# Patient Record
Sex: MALE | Race: WHITE | NOT HISPANIC OR LATINO | Employment: OTHER | URBAN - NONMETROPOLITAN AREA
[De-identification: names, ages, dates, MRNs, and addresses within clinical notes are randomized per-mention and may not be internally consistent; named-entity substitution may affect disease eponyms.]

---

## 2019-07-29 RX ORDER — GABAPENTIN 300 MG/1
300 CAPSULE ORAL 3 TIMES DAILY
COMMUNITY
Start: 2019-01-04 | End: 2020-02-19 | Stop reason: SDUPTHER

## 2019-07-29 RX ORDER — OXYBUTYNIN CHLORIDE 5 MG/1
5 TABLET ORAL 2 TIMES DAILY
COMMUNITY
End: 2020-02-19 | Stop reason: SDUPTHER

## 2019-07-29 RX ORDER — ACETAMINOPHEN 500 MG
1000 TABLET ORAL 2 TIMES DAILY
COMMUNITY

## 2019-07-29 RX ORDER — LANOLIN ALCOHOL/MO/W.PET/CERES
1 CREAM (GRAM) TOPICAL DAILY
COMMUNITY

## 2019-07-29 RX ORDER — CITALOPRAM 20 MG/1
20 TABLET ORAL DAILY
COMMUNITY
End: 2020-02-19 | Stop reason: SDUPTHER

## 2019-07-29 RX ORDER — MELOXICAM 15 MG/1
15 TABLET ORAL DAILY
COMMUNITY
Start: 2019-01-04 | End: 2020-02-19 | Stop reason: SDUPTHER

## 2019-07-29 RX ORDER — LORAZEPAM 0.5 MG/1
0.5 TABLET ORAL EVERY 6 HOURS PRN
COMMUNITY
End: 2019-10-22 | Stop reason: SDUPTHER

## 2019-07-29 RX ORDER — LISINOPRIL 20 MG/1
20 TABLET ORAL DAILY
COMMUNITY
End: 2020-02-19 | Stop reason: SDUPTHER

## 2019-08-02 ENCOUNTER — OFFICE VISIT (OUTPATIENT)
Dept: FAMILY MEDICINE CLINIC | Facility: CLINIC | Age: 69
End: 2019-08-02
Payer: MEDICARE

## 2019-08-02 VITALS
WEIGHT: 226 LBS | HEIGHT: 69 IN | BODY MASS INDEX: 33.47 KG/M2 | DIASTOLIC BLOOD PRESSURE: 74 MMHG | SYSTOLIC BLOOD PRESSURE: 126 MMHG

## 2019-08-02 DIAGNOSIS — I10 ESSENTIAL HYPERTENSION: Chronic | ICD-10-CM

## 2019-08-02 DIAGNOSIS — G62.9 NEUROPATHY: Chronic | ICD-10-CM

## 2019-08-02 DIAGNOSIS — E11.9 TYPE 2 DIABETES MELLITUS WITHOUT COMPLICATION, WITHOUT LONG-TERM CURRENT USE OF INSULIN (HCC): Primary | ICD-10-CM

## 2019-08-02 DIAGNOSIS — S14.129S CENTRAL CORD SYNDROME, SEQUELA (HCC): Chronic | ICD-10-CM

## 2019-08-02 DIAGNOSIS — F41.1 GENERALIZED ANXIETY DISORDER: Chronic | ICD-10-CM

## 2019-08-02 DIAGNOSIS — J30.1 SEASONAL ALLERGIC RHINITIS DUE TO POLLEN: Chronic | ICD-10-CM

## 2019-08-02 PROBLEM — S14.129A CENTRAL CORD SYNDROME (HCC): Chronic | Status: ACTIVE | Noted: 2019-08-02

## 2019-08-02 PROCEDURE — 99214 OFFICE O/P EST MOD 30 MIN: CPT | Performed by: FAMILY MEDICINE

## 2019-08-02 NOTE — PATIENT INSTRUCTIONS
Overall I feel patient is doing very well but does need to get a little more physical activity and needs to try to watch the sweets in his diet    Continue all meds as is and will do wellness check at next visit

## 2019-10-22 DIAGNOSIS — F41.1 GENERALIZED ANXIETY DISORDER: Primary | Chronic | ICD-10-CM

## 2019-10-23 RX ORDER — LORAZEPAM 0.5 MG/1
TABLET ORAL
Qty: 90 TABLET | Refills: 1 | Status: SHIPPED | OUTPATIENT
Start: 2019-10-23 | End: 2020-02-19 | Stop reason: SDUPTHER

## 2019-11-08 LAB — HBA1C MFR BLD HPLC: 5.1 %

## 2019-11-11 NOTE — RESULT ENCOUNTER NOTE
Please call the patient regarding his abnormal result  Cholesterol levels are good  The fasting blood sugar is still slightly elevated but the A1c is relatively low at 5 1 and overall control is good    Watch sweets in diet

## 2019-11-15 ENCOUNTER — OFFICE VISIT (OUTPATIENT)
Dept: FAMILY MEDICINE CLINIC | Facility: CLINIC | Age: 69
End: 2019-11-15
Payer: MEDICARE

## 2019-11-15 VITALS
WEIGHT: 225 LBS | BODY MASS INDEX: 33.33 KG/M2 | HEIGHT: 69 IN | SYSTOLIC BLOOD PRESSURE: 126 MMHG | DIASTOLIC BLOOD PRESSURE: 76 MMHG

## 2019-11-15 DIAGNOSIS — J30.1 SEASONAL ALLERGIC RHINITIS DUE TO POLLEN: Chronic | ICD-10-CM

## 2019-11-15 DIAGNOSIS — E11.9 TYPE 2 DIABETES MELLITUS WITHOUT COMPLICATION, WITHOUT LONG-TERM CURRENT USE OF INSULIN (HCC): Primary | Chronic | ICD-10-CM

## 2019-11-15 DIAGNOSIS — F41.1 GENERALIZED ANXIETY DISORDER: Chronic | ICD-10-CM

## 2019-11-15 DIAGNOSIS — S14.129S CENTRAL CORD SYNDROME, SEQUELA (HCC): Chronic | ICD-10-CM

## 2019-11-15 DIAGNOSIS — G62.9 NEUROPATHY: Chronic | ICD-10-CM

## 2019-11-15 DIAGNOSIS — I10 ESSENTIAL HYPERTENSION: Chronic | ICD-10-CM

## 2019-11-15 DIAGNOSIS — Z23 NEEDS FLU SHOT: ICD-10-CM

## 2019-11-15 DIAGNOSIS — Z00.00 MEDICARE ANNUAL WELLNESS VISIT, SUBSEQUENT: ICD-10-CM

## 2019-11-15 PROCEDURE — 99214 OFFICE O/P EST MOD 30 MIN: CPT | Performed by: FAMILY MEDICINE

## 2019-11-15 PROCEDURE — G0439 PPPS, SUBSEQ VISIT: HCPCS | Performed by: FAMILY MEDICINE

## 2019-11-15 PROCEDURE — G0008 ADMIN INFLUENZA VIRUS VAC: HCPCS | Performed by: FAMILY MEDICINE

## 2019-11-15 PROCEDURE — 90662 IIV NO PRSV INCREASED AG IM: CPT | Performed by: FAMILY MEDICINE

## 2019-11-15 NOTE — PROGRESS NOTES
Assessment and Plan:     Problem List Items Addressed This Visit        Endocrine    Type 2 diabetes mellitus without complication, without long-term current use of insulin (HCC) - Primary (Chronic)     Fasting sugar remains slightly elevated at 1 0 weight but A1c is good watch sweets in diet  Lab Results   Component Value Date    HGBA1C 5 1 11/08/2019            Relevant Orders    Ambulatory referral to Ophthalmology       Respiratory    Seasonal allergic rhinitis due to pollen (Chronic)     Overall doing well continue p r n  Meds            Cardiovascular and Mediastinum    Essential hypertension (Chronic)     Overall stable continue current regimen            Nervous and Auditory    Central cord syndrome (HCC) (Chronic)     Reviewed recent consult patient remains very functional despite the injury continue to push activity as tolerated continue current meds         Neuropathy (Chronic)     Appears stable continue baseline meds            Other    Generalized anxiety disorder (Chronic)     Overall doing well continue current regimen           Other Visit Diagnoses     Medicare annual wellness visit, subsequent        Needs flu shot        Relevant Orders    influenza vaccine, [id number], high-dose, PF 0 5 mL (FLUZONE HIGH-DOSE) (Completed)        BMI Counseling: Body mass index is 32 9 kg/m²  The BMI is above normal  Nutrition recommendations include moderation in carbohydrate intake  Exercise recommendations include moderate physical activity 150 minutes/week  No pharmacotherapy was ordered  Preventive health issues were discussed with patient, and age appropriate screening tests were ordered as noted in patient's After Visit Summary  Personalized health advice and appropriate referrals for health education or preventive services given if needed, as noted in patient's After Visit Summary       History of Present Illness:     Patient presents for Medicare Annual Wellness visit    Patient Care Team:  Lexii Garza Madeline Aponte MD as PCP - General (Family Medicine)     Problem List:     Patient Active Problem List   Diagnosis    Type 2 diabetes mellitus without complication, without long-term current use of insulin ([facility] )    Essential hypertension    Seasonal allergic rhinitis due to pollen    Generalized anxiety disorder    [facility]    Neuropathy      Past Medical and Surgical History:     Past Medical History:   Diagnosis Date    Allergic rhinitis     Depression     Essential hypertension     Von Willebrand disease ([facility] )     Pt requires DDAVP prior to "major" surgery      Past Surgical History:   Procedure Laterality Date    CHOLECYSTECTOMY        Family History:     Family History   Problem Relation Age of Onset    Diabetes Mother     Hypertension Mother     Hyperlipidemia Mother     Diabetes Father     Hypertension Father     Hyperlipidemia Father       Social History:     Social History     Socioeconomic History    Marital status: /Civil Union     Spouse name: None    Number of children: None    Years of education: None    Highest education level: None   Occupational History    None   Social Needs    Financial resource strain: None    Food insecurity:     Worry: None     Inability: None    Transportation needs:     Medical: None     Non-medical: None   Tobacco Use    Smoking status: Former Smoker    Smokeless tobacco: Never Used   Substance and Sexual Activity    Alcohol use: Yes     Comment: Rarely     Drug use: Never    Sexual activity: None   Lifestyle    Physical activity:     Days per week: None     Minutes per session: None    Stress: None   Relationships    Social connections:     Talks on phone: None     Gets together: None     Attends Christianity service: None     Active member of club or organization: None     Attends meetings of clubs or organizations: None     Relationship status: None    Intimate partner violence:     Fear of current or ex partner: None Emotionally abused: None     Physically abused: None     Forced sexual activity: None   Other Topics Concern    None   Social History Narrative    None       Medications and Allergies:     Current Outpatient Medications   Medication Sig Dispense Refill    acetaminophen (TYLENOL) 500 mg tablet Take 1,000 mg by mouth 2 (two) times a day      citalopram (CeleXA) 20 mg tablet Take 20 mg by mouth daily      gabapentin (NEURONTIN) 300 mg capsule Take 300 mg by mouth Three times a day      glucosamine-chondroitin 500-400 MG tablet Take 1 tablet by mouth daily      lisinopril (ZESTRIL) 20 mg tablet Take 20 mg by mouth daily      LORazepam (ATIVAN) 0 5 mg tablet TAKE 1 TABLET BY MOUTH  EVERY DAY AS NEEDED 90 tablet 1    meloxicam (MOBIC) 15 mg tablet Take 15 mg by mouth daily      Multiple Vitamins-Minerals (MULTIVITAMIN ADULTS PO) Take 1 tablet by mouth daily      oxybutynin (DITROPAN) 5 mg tablet Take 5 mg by mouth 2 (two) times a day       No current facility-administered medications for this visit        Allergies   Allergen Reactions    Penicillins Other (See Comments)     RASH, hives; valentín PIP 5/22/01 jgo    Sulfamethoxazole-Trimethoprim Other (See Comments)     developed rash      Immunizations:     Immunization History   Administered Date(s) Administered    INFLUENZA 09/27/2001, 10/31/2002, 10/25/2006, 11/20/2007, 11/25/2008, 12/07/2009, 11/30/2010, 09/16/2011, 10/02/2013, 11/21/2014, 09/30/2015, 11/18/2016, 12/13/2017, 10/23/2018    Influenza, high dose seasonal 0 5 mL 11/15/2019    Pneumococcal Polysaccharide PPV23 08/20/2014    Tdap 05/16/2014      Health Maintenance:         Topic Date Due    Hepatitis C Screening  1950    CRC Screening: Colonoscopy  09/16/2024         Topic Date Due    Hepatitis B Vaccine (1 of 3 - Risk 3-dose series) 11/02/1970    Pneumococcal Vaccine: 65+ Years (1 of 2 - PCV13) 11/02/2016      Medicare Health Risk Assessment:     /76 (BP Location: Left arm, Patient Position: Sitting, Cuff Size: Standard)   Ht 5' 9 34" (1 761 m)   Wt 102 kg (225 lb)   BMI 32 90 kg/m²      Aparna Serrano is here for his Subsequent Wellness visit  Last Medicare Wellness visit information reviewed, patient interviewed and updates made to the record today  Health Risk Assessment:   Patient rates overall health as good  Patient feels that their physical health rating is same  Eyesight was rated as same  Hearing was rated as same  Patient feels that their emotional and mental health rating is same  Pain experienced in the last 7 days has been some  Patient's pain rating has been 4/10  Patient states that he has experienced no weight loss or gain in last 6 months  Has is intermittent back and muscle pain but is functioning well and feels this has been at baseline    Depression Screening:   PHQ-2 Score: 0      Fall Risk Screening: In the past year, patient has experienced: no history of falling in past year      Home Safety:  Patient does not have trouble with stairs inside or outside of their home  Patient has working smoke alarms and has working carbon monoxide detector  Home safety hazards include: none  No issues    Nutrition:   Current diet is Regular  Needs to watch sweets in diet    Medications:   Patient is currently taking over-the-counter supplements  OTC medications include: Multivitamin, probiotic, and chondroitin complex  Patient is able to manage medications  No issues    Activities of Daily Living (ADLs)/Instrumental Activities of Daily Living (IADLs):   Walk and transfer into and out of bed and chair?: Yes  Dress and groom yourself?: Yes    Bathe or shower yourself?: Yes    Feed yourself?  Yes  Do your laundry/housekeeping?: Yes  Manage your money, pay your bills and track your expenses?: Yes  Make your own meals?: Yes    Do your own shopping?: Yes    ADL comments: Very functional at home no issues    Previous Hospitalizations:   Any hospitalizations or ED visits within the last 12 months?: No      Hospitalization Comments:  Follows up with [facility], [address] [place] and [address] at [facility]:   Living will: No    Advanced directive: No      Comments: Discussed need to do the living will and patient does have a form for this    Cognitive Screening:   Provider or family/friend/caregiver concerned regarding cognition?: No    PREVENTIVE SCREENINGS      Cardiovascular Screening:    General: Screening Current      Diabetes Screening:     General: Screening Not Indicated and History Diabetes      Colorectal Cancer Screening:     General: Screening Current      Prostate Cancer Screening:    General: Screening Not Indicated      Osteoporosis Screening:    General: Screening Not Indicated      Abdominal Aortic Aneurysm (AAA) Screening:    Risk factors include: age between 73-67 yo and tobacco use        General: Screening Not Indicated      Lung Cancer Screening:     General: Screening Not Indicated      Hepatitis C Screening:    General: Risks and Benefits Discussed      Agusto Frias MD

## 2019-11-15 NOTE — ASSESSMENT & PLAN NOTE
Fasting sugar remains slightly elevated at 1 0 weight but A1c is good watch sweets in diet  Lab Results   Component Value Date    HGBA1C 5 1 11/08/2019

## 2019-11-15 NOTE — PROGRESS NOTES
Diabetic Foot Exam    Patient's shoes and socks removed  Right Foot/Ankle   Right Foot Inspection    Toe Exam: right toe deformity  Sensory       Monofilament testing: intact  Vascular  Capillary refills: < 3 seconds  The right DP pulse is 1+  The right PT pulse is 1+  Left Foot/Ankle  Left Foot Inspection                           Toe Exam: no left toe deformity                   Sensory       Monofilament: intact  Vascular  Capillary refills: < 3 seconds  The left DP pulse is 1+  The left PT pulse is 1+  Assign Risk Category:  Deformity present; No loss of protective sensation;  No weak pulses       Risk: 1

## 2019-11-15 NOTE — ASSESSMENT & PLAN NOTE
Reviewed recent consult patient remains very functional despite the injury continue to push activity as tolerated continue current meds

## 2019-11-15 NOTE — PROGRESS NOTES
Assessment/Plan:    Type 2 diabetes mellitus without complication, without long-term current use of insulin ([facility])  Fasting sugar remains slightly elevated at 1 0 weight but A1c is good watch sweets in diet  Lab Results   Component Value Date    HGBA1C 5 1 11/08/2019       Essential hypertension  Overall stable continue current regimen    Central cord syndrome [facility])  Reviewed recent consult patient remains very functional despite the injury continue to push activity as tolerated continue current meds    Generalized anxiety disorder  Overall doing well continue current regimen    Neuropathy  Appears stable continue baseline meds    Seasonal allergic rhinitis due to pollen  Overall doing well continue p r n  Meds       Diagnoses and all orders for this visit:    Type 2 diabetes mellitus without complication, without long-term current use of insulin ([facility] )  -     Ambulatory referral to Ophthalmology; Future    Medicare annual wellness visit, subsequent    Needs flu shot  -     influenza vaccine, [id number], high-dose, PF 0 5 mL (FLUZONE HIGH-DOSE)    Essential hypertension    Central cord syndrome, sequela ([address] )    Generalized anxiety disorder    Neuropathy    Seasonal allergic rhinitis due to pollen          Subjective:      Patient ID: Raymundo Bowie is a 77 y o  male  Patient has history of spinal cord injury with central cord syndrome and does self catheterization to pass urine  Also has history of hypertension diabetes mellitus adult, allergic rhinitis lower leg neuropathy and stress disorder  Overall has been doing well  The following portions of the patient's history were reviewed and updated as appropriate: allergies, current medications, past medical history, past social history, past surgical history and problem list     Review of Systems   Constitutional: Negative for activity change, appetite change, chills, fatigue, fever and unexpected weight change     HENT: Negative for congestion, dental problem, hearing loss, rhinorrhea, trouble swallowing and voice change  Eyes: Negative for visual disturbance  Respiratory: Negative for apnea, cough, chest tightness and shortness of breath  Cardiovascular: Negative for chest pain, palpitations and leg swelling  Gastrointestinal: Negative for abdominal distention, abdominal pain, constipation and diarrhea  Endocrine: Negative for polyuria  Genitourinary: Positive for difficulty urinating (Does self catheterization 5 times a day) and enuresis  Musculoskeletal: Positive for arthralgias and gait problem  Negative for myalgias  Skin: Negative for rash  Allergic/Immunologic: Positive for environmental allergies  Neurological: Negative for dizziness, weakness, light-headedness, numbness and headaches  Hematological: Negative for adenopathy  Psychiatric/Behavioral: Negative for agitation, confusion and sleep disturbance  The patient is not nervous/anxious  Objective:      /76 (BP Location: Left arm, Patient Position: Sitting, Cuff Size: Standard)   Ht 5' 9 34" (1 761 m)   Wt 102 kg (225 lb)   BMI 32 90 kg/m²          Physical Exam   Constitutional: He is oriented to person, place, and time  He appears well-developed and well-nourished  No distress  HENT:   Head: Normocephalic  Right Ear: Tympanic membrane, external ear and ear canal normal  Decreased hearing ( mixed hearing loss with 25 decibel screen no difficulty with conversation) is noted  Left Ear: Tympanic membrane, external ear and ear canal normal  Decreased hearing ( mixed hearing loss with 25 decibel screen) is noted  Nose: Nose normal    Mouth/Throat: Oropharynx is clear and moist  Normal dentition  Eyes: Pupils are equal, round, and reactive to light  Conjunctivae and EOM are normal    Neck: Normal range of motion  Neck supple  No thyromegaly present  Cardiovascular: Normal rate, regular rhythm, normal heart sounds and intact distal pulses     No murmur (Rate is 72 no bruits are noted) heard  Pulmonary/Chest: Effort normal and breath sounds normal    Abdominal: Soft  He exhibits no distension and no mass  There is no tenderness  Musculoskeletal: He exhibits no edema  Lymphadenopathy:     He has no cervical adenopathy  Neurological: He is alert and oriented to person, place, and time  He displays abnormal reflex ( reflexes 3+)  No cranial nerve deficit or sensory deficit  He exhibits abnormal muscle tone ( spastic quality with the hands and lower legs)  Coordination abnormal    Skin: Skin is warm and dry  Capillary refill takes 2 to 3 seconds  No rash noted  Psychiatric: He has a normal mood and affect  His behavior is normal  Judgment and thought content normal    Nursing note and vitals reviewed

## 2019-11-15 NOTE — PATIENT INSTRUCTIONS

## 2020-02-19 ENCOUNTER — OFFICE VISIT (OUTPATIENT)
Dept: FAMILY MEDICINE CLINIC | Facility: CLINIC | Age: 70
End: 2020-02-19
Payer: MEDICARE

## 2020-02-19 VITALS
HEIGHT: 69 IN | BODY MASS INDEX: 33.62 KG/M2 | DIASTOLIC BLOOD PRESSURE: 74 MMHG | WEIGHT: 227 LBS | SYSTOLIC BLOOD PRESSURE: 124 MMHG

## 2020-02-19 DIAGNOSIS — F41.1 GENERALIZED ANXIETY DISORDER: Chronic | ICD-10-CM

## 2020-02-19 DIAGNOSIS — G62.9 NEUROPATHY: Chronic | ICD-10-CM

## 2020-02-19 DIAGNOSIS — E11.9 TYPE 2 DIABETES MELLITUS WITHOUT COMPLICATION, WITHOUT LONG-TERM CURRENT USE OF INSULIN (HCC): Chronic | ICD-10-CM

## 2020-02-19 DIAGNOSIS — I10 ESSENTIAL HYPERTENSION: Primary | Chronic | ICD-10-CM

## 2020-02-19 DIAGNOSIS — M15.3 POST-TRAUMATIC OSTEOARTHRITIS OF MULTIPLE JOINTS: ICD-10-CM

## 2020-02-19 DIAGNOSIS — S14.129S CENTRAL CORD SYNDROME, SEQUELA (HCC): Chronic | ICD-10-CM

## 2020-02-19 LAB
LEFT EYE DIABETIC RETINOPATHY: NORMAL
RIGHT EYE DIABETIC RETINOPATHY: NORMAL

## 2020-02-19 PROCEDURE — 3008F BODY MASS INDEX DOCD: CPT | Performed by: FAMILY MEDICINE

## 2020-02-19 PROCEDURE — 4040F PNEUMOC VAC/ADMIN/RCVD: CPT | Performed by: FAMILY MEDICINE

## 2020-02-19 PROCEDURE — 4010F ACE/ARB THERAPY RXD/TAKEN: CPT | Performed by: FAMILY MEDICINE

## 2020-02-19 PROCEDURE — 3078F DIAST BP <80 MM HG: CPT | Performed by: FAMILY MEDICINE

## 2020-02-19 PROCEDURE — 99214 OFFICE O/P EST MOD 30 MIN: CPT | Performed by: FAMILY MEDICINE

## 2020-02-19 PROCEDURE — 3074F SYST BP LT 130 MM HG: CPT | Performed by: FAMILY MEDICINE

## 2020-02-19 PROCEDURE — 1036F TOBACCO NON-USER: CPT | Performed by: FAMILY MEDICINE

## 2020-02-19 PROCEDURE — 1160F RVW MEDS BY RX/DR IN RCRD: CPT | Performed by: FAMILY MEDICINE

## 2020-02-19 RX ORDER — LISINOPRIL 20 MG/1
20 TABLET ORAL DAILY
Qty: 90 TABLET | Refills: 3 | Status: SHIPPED | OUTPATIENT
Start: 2020-02-19 | End: 2021-01-02

## 2020-02-19 RX ORDER — GABAPENTIN 300 MG/1
300 CAPSULE ORAL 3 TIMES DAILY
Qty: 270 CAPSULE | Refills: 3 | Status: SHIPPED | OUTPATIENT
Start: 2020-02-19 | End: 2021-03-15

## 2020-02-19 RX ORDER — MELOXICAM 15 MG/1
15 TABLET ORAL DAILY
Qty: 90 TABLET | Refills: 3 | Status: SHIPPED | OUTPATIENT
Start: 2020-02-19 | End: 2021-03-15

## 2020-02-19 RX ORDER — LORAZEPAM 0.5 MG/1
0.5 TABLET ORAL DAILY PRN
Qty: 90 TABLET | Refills: 1 | Status: SHIPPED | OUTPATIENT
Start: 2020-02-19 | End: 2020-08-23

## 2020-02-19 RX ORDER — OXYBUTYNIN CHLORIDE 5 MG/1
5 TABLET ORAL 2 TIMES DAILY
Qty: 180 TABLET | Refills: 3 | Status: SHIPPED | OUTPATIENT
Start: 2020-02-19 | End: 2021-01-02

## 2020-02-19 RX ORDER — CITALOPRAM 20 MG/1
20 TABLET ORAL DAILY
Qty: 90 TABLET | Refills: 3 | Status: SHIPPED | OUTPATIENT
Start: 2020-02-19 | End: 2021-01-02

## 2020-02-19 NOTE — ASSESSMENT & PLAN NOTE
Overall showing excellent control with diet continue to watch starch in diet limiting to 1 medium serving per meal  Lab Results   Component Value Date    HGBA1C 5 1 11/08/2019

## 2020-02-19 NOTE — PROGRESS NOTES
Assessment/Plan:    Type 2 diabetes mellitus without complication, without long-term current use of insulin (HCC)  Overall showing excellent control with diet continue to watch starch in diet limiting to 1 medium serving per meal  Lab Results   Component Value Date    HGBA1C 5 1 11/08/2019       Essential hypertension  Overall stable continue current regimen    Neuropathy  Appears stable continue current regimen  Gave refills    Generalized anxiety disorder  Overall doing well continue current regimen    Central cord syndrome ([address] )  Continues to function well continue baseline meds and follow-up  Push activity as tolerated       Diagnoses and all orders for this visit:    Essential hypertension  -     lisinopril (ZESTRIL) 20 mg tablet; Take 1 tablet (20 mg total) by mouth daily    Type 2 diabetes mellitus without complication, without long-term current use of insulin (HCC)    Generalized anxiety disorder  -     citalopram (CeleXA) 20 mg tablet; Take 1 tablet (20 mg total) by mouth daily  -     LORazepam (ATIVAN) 0 5 mg tablet; Take 1 tablet (0 5 mg total) by mouth daily as needed for anxiety    Central cord syndrome, sequela (HCC)  -     oxybutynin (DITROPAN) 5 mg tablet; Take 1 tablet (5 mg total) by mouth 2 (two) times a day    Neuropathy  -     gabapentin (Neurontin) 300 mg capsule; Take 1 capsule (300 mg total) by mouth 3 (three) times a day    Post-traumatic osteoarthritis of multiple joints  -     meloxicam (MOBIC) 15 mg tablet; Take 1 tablet (15 mg total) by mouth daily          Subjective:      Patient ID: Vik Campa is a 77 y o  male  Patient has history of spinal cord injury with central cord syndrome and does self catheterization to pass urine  Also has history of hypertension diabetes mellitus adult, allergic rhinitis lower leg neuropathy and stress disorder  Overall has been doing well         The following portions of the patient's history were reviewed and updated as appropriate: allergies, current medications, past medical history, past social history and problem list BMI Counseling: Body mass index is 33 19 kg/m²  The BMI is above normal  Nutrition recommendations include moderation in carbohydrate intake  Exercise recommendations include moderate physical activity 150 minutes/week  No pharmacotherapy was ordered       Review of Systems   Constitutional: Negative for activity change, appetite change, chills, fatigue, fever and unexpected weight change  HENT: Negative for congestion, rhinorrhea and trouble swallowing  Eyes: Negative for visual disturbance  Respiratory: Negative for apnea, cough, chest tightness and shortness of breath  Cardiovascular: Negative for chest pain, palpitations and leg swelling  Gastrointestinal: Negative for abdominal distention, abdominal pain, constipation and diarrhea  Endocrine: Negative for polyuria  Genitourinary: Positive for difficulty urinating (Does do self catheterization)  Negative for enuresis  Musculoskeletal: Positive for arthralgias, back pain, gait problem and neck pain  Negative for myalgias  Skin: Negative for rash  Allergic/Immunologic: Positive for environmental allergies  Neurological: Negative for dizziness, weakness, light-headedness, numbness and headaches  Hematological: Negative for adenopathy  Psychiatric/Behavioral: Negative for agitation and sleep disturbance  Objective:      /74 (BP Location: Right arm, Patient Position: Sitting, Cuff Size: Standard)   Ht 5' 9 34" (1 761 m)   Wt 103 kg (227 lb)   BMI 33 19 kg/m²          Physical Exam   Constitutional: He is oriented to person, place, and time  He appears well-developed and well-nourished  No distress  HENT:   Head: Normocephalic  Nose: Nose normal    Mouth/Throat: Oropharynx is clear and moist    Eyes: Conjunctivae are normal    Neck: Neck supple  No thyromegaly present     Cardiovascular: Normal rate, regular rhythm, normal heart sounds and intact distal pulses  No murmur ( rate is 72 no bruits are noted) heard  Pulmonary/Chest: Effort normal and breath sounds normal    Abdominal: Soft  He exhibits no distension and no mass  There is no tenderness  Musculoskeletal: He exhibits no edema  Lymphadenopathy:     He has no cervical adenopathy  Neurological: He is alert and oriented to person, place, and time  He displays abnormal reflex (Reflexes 3 to 4+)  Coordination abnormal    Skin: Skin is warm and dry  No rash noted  Psychiatric: He has a normal mood and affect  His behavior is normal  Judgment and thought content normal    Nursing note and vitals reviewed

## 2020-02-19 NOTE — PATIENT INSTRUCTIONS
Overall seems to be doing well  Gave refills for medications    Patient is going to get eye exam in should have reports forwarded to the office

## 2020-05-29 ENCOUNTER — OFFICE VISIT (OUTPATIENT)
Dept: FAMILY MEDICINE CLINIC | Facility: CLINIC | Age: 70
End: 2020-05-29
Payer: MEDICARE

## 2020-05-29 VITALS
SYSTOLIC BLOOD PRESSURE: 126 MMHG | BODY MASS INDEX: 32.88 KG/M2 | HEIGHT: 69 IN | TEMPERATURE: 97.2 F | WEIGHT: 222 LBS | DIASTOLIC BLOOD PRESSURE: 76 MMHG

## 2020-05-29 DIAGNOSIS — G62.9 NEUROPATHY: Chronic | ICD-10-CM

## 2020-05-29 DIAGNOSIS — I10 ESSENTIAL HYPERTENSION: Chronic | ICD-10-CM

## 2020-05-29 DIAGNOSIS — E11.9 TYPE 2 DIABETES MELLITUS WITHOUT COMPLICATION, WITHOUT LONG-TERM CURRENT USE OF INSULIN (HCC): Primary | Chronic | ICD-10-CM

## 2020-05-29 DIAGNOSIS — J30.1 SEASONAL ALLERGIC RHINITIS DUE TO POLLEN: Chronic | ICD-10-CM

## 2020-05-29 DIAGNOSIS — F41.1 GENERALIZED ANXIETY DISORDER: Chronic | ICD-10-CM

## 2020-05-29 DIAGNOSIS — S14.129S CENTRAL CORD SYNDROME, SEQUELA (HCC): Chronic | ICD-10-CM

## 2020-05-29 LAB — SL AMB POCT HEMOGLOBIN AIC: 5.5 (ref ?–6.5)

## 2020-05-29 PROCEDURE — 3074F SYST BP LT 130 MM HG: CPT | Performed by: FAMILY MEDICINE

## 2020-05-29 PROCEDURE — 1160F RVW MEDS BY RX/DR IN RCRD: CPT | Performed by: FAMILY MEDICINE

## 2020-05-29 PROCEDURE — 3008F BODY MASS INDEX DOCD: CPT | Performed by: FAMILY MEDICINE

## 2020-05-29 PROCEDURE — 4040F PNEUMOC VAC/ADMIN/RCVD: CPT | Performed by: FAMILY MEDICINE

## 2020-05-29 PROCEDURE — 83036 HEMOGLOBIN GLYCOSYLATED A1C: CPT | Performed by: FAMILY MEDICINE

## 2020-05-29 PROCEDURE — 3044F HG A1C LEVEL LT 7.0%: CPT | Performed by: FAMILY MEDICINE

## 2020-05-29 PROCEDURE — 1036F TOBACCO NON-USER: CPT | Performed by: FAMILY MEDICINE

## 2020-05-29 PROCEDURE — 3078F DIAST BP <80 MM HG: CPT | Performed by: FAMILY MEDICINE

## 2020-05-29 PROCEDURE — 99214 OFFICE O/P EST MOD 30 MIN: CPT | Performed by: FAMILY MEDICINE

## 2020-05-29 PROCEDURE — 2022F DILAT RTA XM EVC RTNOPTHY: CPT | Performed by: FAMILY MEDICINE

## 2020-08-08 ENCOUNTER — APPOINTMENT (EMERGENCY)
Dept: CT IMAGING | Facility: HOSPITAL | Age: 70
End: 2020-08-08
Payer: MEDICARE

## 2020-08-08 ENCOUNTER — HOSPITAL ENCOUNTER (OUTPATIENT)
Facility: HOSPITAL | Age: 70
Setting detail: OBSERVATION
Discharge: HOME | End: 2020-08-09
Attending: EMERGENCY MEDICINE | Admitting: FAMILY MEDICINE
Payer: MEDICARE

## 2020-08-08 DIAGNOSIS — R41.82 ALTERED MENTAL STATUS, UNSPECIFIED ALTERED MENTAL STATUS TYPE: Primary | ICD-10-CM

## 2020-08-08 DIAGNOSIS — R55 SYNCOPE: ICD-10-CM

## 2020-08-08 PROBLEM — S00.03XA TRAUMATIC HEMATOMA OF SCALP: Status: ACTIVE | Noted: 2020-08-08

## 2020-08-08 LAB
ALBUMIN SERPL BCP-MCNC: 4.3 G/DL (ref 3.5–5)
ALP SERPL-CCNC: 66 U/L (ref 46–116)
ALT SERPL W P-5'-P-CCNC: 37 U/L (ref 12–78)
ANION GAP SERPL CALCULATED.3IONS-SCNC: 7 MMOL/L (ref 4–13)
APTT PPP: 47 SECONDS (ref 23–37)
AST SERPL W P-5'-P-CCNC: 21 U/L (ref 5–45)
BASOPHILS # BLD AUTO: 0.02 THOUSANDS/ΜL (ref 0–0.1)
BASOPHILS NFR BLD AUTO: 0 % (ref 0–1)
BILIRUB SERPL-MCNC: 1.07 MG/DL (ref 0.2–1)
BUN SERPL-MCNC: 32 MG/DL (ref 5–25)
CALCIUM SERPL-MCNC: 9.3 MG/DL (ref 8.3–10.1)
CHLORIDE SERPL-SCNC: 102 MMOL/L (ref 100–108)
CO2 SERPL-SCNC: 32 MMOL/L (ref 21–32)
CREAT SERPL-MCNC: 1.41 MG/DL (ref 0.6–1.3)
EOSINOPHIL # BLD AUTO: 0.11 THOUSAND/ΜL (ref 0–0.61)
EOSINOPHIL NFR BLD AUTO: 1 % (ref 0–6)
ERYTHROCYTE [DISTWIDTH] IN BLOOD BY AUTOMATED COUNT: 12.7 % (ref 11.6–15.1)
GFR SERPL CREATININE-BSD FRML MDRD: 51 ML/MIN/1.73SQ M
GLUCOSE SERPL-MCNC: 153 MG/DL (ref 65–140)
HCT VFR BLD AUTO: 40.2 % (ref 36.5–49.3)
HGB BLD-MCNC: 14.1 G/DL (ref 12–17)
IMM GRANULOCYTES # BLD AUTO: 0.04 THOUSAND/UL (ref 0–0.2)
IMM GRANULOCYTES NFR BLD AUTO: 1 % (ref 0–2)
INR PPP: 1 (ref 0.84–1.19)
LACTATE SERPL-SCNC: 1 MMOL/L (ref 0.5–2)
LIPASE SERPL-CCNC: 134 U/L (ref 73–393)
LYMPHOCYTES # BLD AUTO: 1.12 THOUSANDS/ΜL (ref 0.6–4.47)
LYMPHOCYTES NFR BLD AUTO: 14 % (ref 14–44)
MCH RBC QN AUTO: 30.7 PG (ref 26.8–34.3)
MCHC RBC AUTO-ENTMCNC: 35.1 G/DL (ref 31.4–37.4)
MCV RBC AUTO: 88 FL (ref 82–98)
MONOCYTES # BLD AUTO: 0.27 THOUSAND/ΜL (ref 0.17–1.22)
MONOCYTES NFR BLD AUTO: 3 % (ref 4–12)
NEUTROPHILS # BLD AUTO: 6.29 THOUSANDS/ΜL (ref 1.85–7.62)
NEUTS SEG NFR BLD AUTO: 81 % (ref 43–75)
NRBC BLD AUTO-RTO: 0 /100 WBCS
PLATELET # BLD AUTO: 133 THOUSANDS/UL (ref 149–390)
PMV BLD AUTO: 9.4 FL (ref 8.9–12.7)
POTASSIUM SERPL-SCNC: 4.7 MMOL/L (ref 3.5–5.3)
PROT SERPL-MCNC: 7.4 G/DL (ref 6.4–8.2)
PROTHROMBIN TIME: 13 SECONDS (ref 11.6–14.5)
RBC # BLD AUTO: 4.59 MILLION/UL (ref 3.88–5.62)
SODIUM SERPL-SCNC: 141 MMOL/L (ref 136–145)
TROPONIN I SERPL-MCNC: <0.02 NG/ML
WBC # BLD AUTO: 7.85 THOUSAND/UL (ref 4.31–10.16)

## 2020-08-08 PROCEDURE — 96361 HYDRATE IV INFUSION ADD-ON: CPT

## 2020-08-08 PROCEDURE — 99285 EMERGENCY DEPT VISIT HI MDM: CPT

## 2020-08-08 PROCEDURE — 85730 THROMBOPLASTIN TIME PARTIAL: CPT | Performed by: EMERGENCY MEDICINE

## 2020-08-08 PROCEDURE — 96360 HYDRATION IV INFUSION INIT: CPT

## 2020-08-08 PROCEDURE — 85025 COMPLETE CBC W/AUTO DIFF WBC: CPT | Performed by: EMERGENCY MEDICINE

## 2020-08-08 PROCEDURE — 80053 COMPREHEN METABOLIC PANEL: CPT | Performed by: EMERGENCY MEDICINE

## 2020-08-08 PROCEDURE — 99285 EMERGENCY DEPT VISIT HI MDM: CPT | Performed by: EMERGENCY MEDICINE

## 2020-08-08 PROCEDURE — 70450 CT HEAD/BRAIN W/O DYE: CPT

## 2020-08-08 PROCEDURE — 83690 ASSAY OF LIPASE: CPT | Performed by: EMERGENCY MEDICINE

## 2020-08-08 PROCEDURE — 99219 PR INITIAL OBSERVATION CARE/DAY 50 MINUTES: CPT | Performed by: NURSE PRACTITIONER

## 2020-08-08 PROCEDURE — 84484 ASSAY OF TROPONIN QUANT: CPT | Performed by: EMERGENCY MEDICINE

## 2020-08-08 PROCEDURE — 93005 ELECTROCARDIOGRAM TRACING: CPT

## 2020-08-08 PROCEDURE — G1004 CDSM NDSC: HCPCS

## 2020-08-08 PROCEDURE — 83605 ASSAY OF LACTIC ACID: CPT | Performed by: EMERGENCY MEDICINE

## 2020-08-08 PROCEDURE — 72125 CT NECK SPINE W/O DYE: CPT

## 2020-08-08 PROCEDURE — 36415 COLL VENOUS BLD VENIPUNCTURE: CPT | Performed by: EMERGENCY MEDICINE

## 2020-08-08 PROCEDURE — 85610 PROTHROMBIN TIME: CPT | Performed by: EMERGENCY MEDICINE

## 2020-08-08 RX ORDER — GABAPENTIN 300 MG/1
300 CAPSULE ORAL 3 TIMES DAILY
Status: DISCONTINUED | OUTPATIENT
Start: 2020-08-08 | End: 2020-08-09 | Stop reason: HOSPADM

## 2020-08-08 RX ORDER — LISINOPRIL 20 MG/1
20 TABLET ORAL DAILY
Status: DISCONTINUED | OUTPATIENT
Start: 2020-08-09 | End: 2020-08-09 | Stop reason: HOSPADM

## 2020-08-08 RX ORDER — ACETAMINOPHEN 325 MG/1
650 TABLET ORAL EVERY 6 HOURS PRN
Status: DISCONTINUED | OUTPATIENT
Start: 2020-08-08 | End: 2020-08-09 | Stop reason: HOSPADM

## 2020-08-08 RX ORDER — LORAZEPAM 0.5 MG/1
0.5 TABLET ORAL DAILY PRN
Status: DISCONTINUED | OUTPATIENT
Start: 2020-08-08 | End: 2020-08-09 | Stop reason: HOSPADM

## 2020-08-08 RX ORDER — CITALOPRAM 20 MG/1
20 TABLET ORAL DAILY
Status: DISCONTINUED | OUTPATIENT
Start: 2020-08-09 | End: 2020-08-09 | Stop reason: HOSPADM

## 2020-08-08 RX ORDER — OXYBUTYNIN CHLORIDE 5 MG/1
5 TABLET ORAL 2 TIMES DAILY
Status: DISCONTINUED | OUTPATIENT
Start: 2020-08-08 | End: 2020-08-09 | Stop reason: HOSPADM

## 2020-08-08 RX ADMIN — ACETAMINOPHEN 650 MG: 325 TABLET ORAL at 21:59

## 2020-08-08 RX ADMIN — GABAPENTIN 300 MG: 300 CAPSULE ORAL at 23:42

## 2020-08-08 RX ADMIN — LORAZEPAM 0.5 MG: 0.5 TABLET ORAL at 23:42

## 2020-08-08 RX ADMIN — SODIUM CHLORIDE 1000 ML: 0.9 INJECTION, SOLUTION INTRAVENOUS at 19:54

## 2020-08-08 RX ADMIN — OXYBUTYNIN CHLORIDE 5 MG: 5 TABLET ORAL at 23:42

## 2020-08-08 NOTE — ED PROVIDER NOTES
History  Chief Complaint   Patient presents with    Altered Mental Status     Patient called wife with confusion  Patient then reported to wife that he may have fallen  Wife came home and found bloody shirt in bathroom but patient could not recall events  Arrived by car  Drove in by wife  Wife states that the patient called her while she was shopping  States he is not sure what happened  He had a bloody sure it  Thinks he fell  Patient has a slight headache  No neck pain  No nausea or vomiting  No focal weakness or numbness  No loss of bladder or bowel functions  No history of seizures  Patient amnestic to the events surrounding what happened  Unsure if he fell  No tongue biting  Wife states that since his C2 fracture he does fall frequently  Patient is unsure if he fell which caused him to his head  History provided by:  Patient and spouse   used: No    Altered Mental Status   Presenting symptoms: memory loss    Severity:  Mild  Most recent episode: Today  Episode history:  Single  Duration: Unknown amount  Timing:  Constant  Progression:  Resolved  Chronicity:  New  Context: head injury    Context: not dementia, not drug use, not homeless, taking medications as prescribed, not nursing home resident and not recent change in medication    Recent head injury:  During the event  Associated symptoms: headaches    Associated symptoms: no abdominal pain, no bladder incontinence, no depression, no fever, no hallucinations, no nausea, no palpitations, no rash, no seizures, no slurred speech and no vomiting        Prior to Admission Medications   Prescriptions Last Dose Informant Patient Reported? Taking?    LORazepam (ATIVAN) 0 5 mg tablet   No Yes   Sig: Take 1 tablet (0 5 mg total) by mouth daily as needed for anxiety   Multiple Vitamins-Minerals (MULTIVITAMIN ADULTS PO) 8/8/2020 at Unknown time  Yes Yes   Sig: Take 1 tablet by mouth daily   acetaminophen (TYLENOL) 500 mg tablet   Yes Yes   Sig: Take 1,000 mg by mouth 2 (two) times a day   citalopram (CeleXA) 20 mg tablet 8/8/2020 at Unknown time  No Yes   Sig: Take 1 tablet (20 mg total) by mouth daily   gabapentin (Neurontin) 300 mg capsule 8/8/2020 at Unknown time  No Yes   Sig: Take 1 capsule (300 mg total) by mouth 3 (three) times a day   glucosamine-chondroitin 500-400 MG tablet 8/8/2020 at Unknown time  Yes Yes   Sig: Take 1 tablet by mouth daily   lisinopril (ZESTRIL) 20 mg tablet 8/8/2020 at Unknown time  No Yes   Sig: Take 1 tablet (20 mg total) by mouth daily   meloxicam (MOBIC) 15 mg tablet 8/8/2020 at Unknown time  No Yes   Sig: Take 1 tablet (15 mg total) by mouth daily   oxybutynin (DITROPAN) 5 mg tablet 8/8/2020 at Unknown time  No Yes   Sig: Take 1 tablet (5 mg total) by mouth 2 (two) times a day      Facility-Administered Medications: None       Past Medical History:   Diagnosis Date    Allergic rhinitis     C2 spinal cord injury ([facility] [address] )     Depression     Essential hypertension     Self-catheterizes urinary bladder     Von Willebrand disease ([facility] [address] )     Pt requires DDAVP prior to "major" surgery        Past Surgical History:   Procedure Laterality Date    CHOLECYSTECTOMY      FEEDING TUBE PLACEMENT         Family History   Problem Relation Age of Onset    Diabetes Mother     Hypertension Mother     Hyperlipidemia Mother     Diabetes Father     Hypertension Father     Hyperlipidemia Father      I have reviewed and agree with the history as documented  E-Cigarette/Vaping    E-Cigarette Use Never User      E-Cigarette/Vaping Substances     Social History     Tobacco Use    Smoking status: Former Smoker     Packs/day: 1 00     Years: 20 00     Pack years: 20 00     Types: Cigarettes    Smokeless tobacco: Never Used   Substance Use Topics    Alcohol use: Yes     Comment: Rarely     Drug use: Never       Review of Systems   Constitutional: Negative for chills and fever     HENT: Negative for ear pain, hearing loss, sore throat, trouble swallowing and voice change  Eyes: Negative for pain and discharge  Respiratory: Negative for cough, shortness of breath and wheezing  Cardiovascular: Negative for chest pain and palpitations  Gastrointestinal: Negative for abdominal pain, blood in stool, constipation, diarrhea, nausea and vomiting  Genitourinary: Negative for bladder incontinence, dysuria, flank pain, frequency and hematuria  Musculoskeletal: Negative for joint swelling, neck pain and neck stiffness  Skin: Negative for rash and wound  Neurological: Positive for headaches  Negative for dizziness, seizures, syncope and facial asymmetry  Psychiatric/Behavioral: Positive for memory loss  Negative for hallucinations, self-injury and suicidal ideas  All other systems reviewed and are negative  Physical Exam  Physical Exam  Vitals signs and nursing note reviewed  Constitutional:       General: He is not in acute distress  Appearance: He is well-developed  HENT:      Head: Normocephalic  Comments: Abrasion to the back of the head  Right Ear: External ear normal       Left Ear: External ear normal    Eyes:      Conjunctiva/sclera: Conjunctivae normal       Pupils: Pupils are equal, round, and reactive to light  Neck:      Musculoskeletal: Normal range of motion and neck supple  Cardiovascular:      Rate and Rhythm: Normal rate and regular rhythm  Heart sounds: Normal heart sounds  No murmur  Pulmonary:      Effort: Pulmonary effort is normal       Breath sounds: Normal breath sounds  No wheezing or rales  Abdominal:      General: Bowel sounds are normal  There is no distension  Palpations: Abdomen is soft  Tenderness: There is no abdominal tenderness  There is no guarding or rebound  Musculoskeletal:         General: No deformity  Comments: Decreased range of motion to the left upper extremity secondary to an old cervical fracture     Skin: General: Skin is warm and dry  Findings: No rash  Neurological:      Mental Status: He is alert and oriented to person, place, and time  GCS: GCS eye subscore is 4  GCS verbal subscore is 5  GCS motor subscore is 6  Cranial Nerves: No cranial nerve deficit  Psychiatric:         Behavior: Behavior normal          Vital Signs  ED Triage Vitals [08/08/20 1937]   Temperature Pulse Respirations Blood Pressure SpO2   (!) 97 4 °F (36 3 °C) 78 20 168/91 96 %      Temp Source Heart Rate Source Patient Position - Orthostatic VS BP Location FiO2 (%)   Temporal Monitor Lying Right arm --      Pain Score       2           Vitals:    08/08/20 1937 08/08/20 2034   BP: 168/91 168/82   Pulse: 78 64   Patient Position - Orthostatic VS: Lying Lying         Visual Acuity  Visual Acuity      Most Recent Value   L Pupil Size (mm)  3   R Pupil Size (mm)  3          ED Medications  Medications   sodium chloride 0 9 % bolus 1,000 mL (1,000 mL Intravenous New Bag 8/8/20 1954)       Diagnostic Studies  Results Reviewed     Procedure Component Value Units Date/Time    Lactic acid [[id number]]  (Normal) Collected:  08/08/20 1950    Lab Status:  Final result Specimen:  Blood from Arm, Left Updated:  08/08/20 2017     LACTIC ACID 1 0 mmol/L     Narrative:       Result may be elevated if tourniquet was used during collection      Troponin I [[id number]]  (Normal) Collected:  08/08/20 1950    Lab Status:  Final result Specimen:  Blood from Arm, Left Updated:  08/08/20 2017     Troponin I <0 02 ng/mL     Comprehensive metabolic panel [[id number]]  (Abnormal) Collected:  08/08/20 1950    Lab Status:  Final result Specimen:  Blood from Arm, Left Updated:  08/08/20 2015     Sodium 141 mmol/L      Potassium 4 7 mmol/L      Chloride 102 mmol/L      CO2 32 mmol/L      ANION GAP 7 mmol/L      BUN 32 mg/dL      Creatinine 1 41 mg/dL      Glucose 153 mg/dL      Calcium 9 3 mg/dL      AST 21 U/L      ALT 37 U/L      Alkaline Phosphatase 66 U/L Total Protein 7 4 g/dL      Albumin 4 3 g/dL      Total Bilirubin 1 07 mg/dL      eGFR 51 ml/min/1 73sq m     Narrative:       [place] guidelines for Chronic Kidney Disease (CKD):     Stage 1 with normal or high GFR (GFR > 90 mL/min/1 73 square meters)    Stage 2 Mild CKD (GFR = 60-89 mL/min/1 73 square meters)    Stage 3A Moderate CKD (GFR = 45-59 mL/min/1 73 square meters)    Stage 3B Moderate CKD (GFR = 30-44 mL/min/1 73 square meters)    Stage 4 Severe CKD (GFR = 15-29 mL/min/1 73 square meters)    Stage 5 End Stage CKD (GFR <15 mL/min/1 73 square meters)  Note: GFR calculation is accurate only with a steady state creatinine    Lipase [[id number]]  (Normal) Collected:  08/08/20 1950    Lab Status:  Final result Specimen:  Blood from Arm, Left Updated:  08/08/20 2015     Lipase 134 u/L     Protime-INR [[id number]]  (Normal) Collected:  08/08/20 1950    Lab Status:  Final result Specimen:  Blood from Arm, Left Updated:  08/08/20 2009     Protime 13 0 seconds      INR 1 00    APTT [[id number]]  (Abnormal) Collected:  08/08/20 1950    Lab Status:  Final result Specimen:  Blood from Arm, Left Updated:  08/08/20 2009     PTT 47 seconds     CBC and differential [[id number]]  (Abnormal) Collected:  08/08/20 1950    Lab Status:  Final result Specimen:  Blood from Arm, Left Updated:  08/08/20 2000     WBC 7 85 Thousand/uL      RBC 4 59 Million/uL      Hemoglobin 14 1 g/dL      Hematocrit 40 2 %      MCV 88 fL      MCH 30 7 pg      MCHC 35 1 g/dL      RDW 12 7 %      MPV 9 4 fL      Platelets 302 Thousands/uL      nRBC 0 /100 WBCs      Neutrophils Relative 81 %      Immat GRANS % 1 %      Lymphocytes Relative 14 %      Monocytes Relative 3 %      Eosinophils Relative 1 %      Basophils Relative 0 %      Neutrophils Absolute 6 29 Thousands/µL      Immature Grans Absolute 0 04 Thousand/uL      Lymphocytes Absolute 1 12 Thousands/µL      Monocytes Absolute 0 27 Thousand/µL      Eosinophils Absolute 0 11 Thousand/µL      Basophils Absolute 0 02 Thousands/µL                  CT head without contrast   Final Result by Carla Thorpe MD (08/08 2025)      1  Right parietal soft tissue swelling/hematoma  2   No intracranial hemorrhage or calvarial fracture  Workstation performed: [id number]         CT cervical spine without contrast   Final Result by Carla Thorpe MD (08/08 2025)      No cervical spine fracture or traumatic malalignment  Workstation performed: [id number]                    Procedures  ECG 12 Lead Documentation Only    Date/Time: 8/8/2020 7:41 PM  Performed by: Melvin Carrington MD  Authorized by: Melvin Carrington MD     ECG reviewed by me, the ED Provider: yes    Patient location:  ED  Previous ECG:     Previous ECG:  Unavailable  Rate:     ECG rate:  60  Rhythm:     Rhythm: sinus rhythm    Ectopy:     Ectopy: none    QRS:     QRS axis:  Normal             ED Course  ED Course as of Aug 08 2036   Sat Aug 08, 2020   2031 Patient seen  Awake alert  Answering questions appropriate  Neurologic exam is nonfocal   Patient with decreased motion of the left upper extremity but hand  is good  Decreased range of motion secondary to an old C2 fracture  US AUDIT      Most Recent Value   Initial Alcohol Screen: US AUDIT-C    1  How often do you have a drink containing alcohol?  0 Filed at: 08/08/2020 1935   2  How many drinks containing alcohol do you have on a typical day you are drinking? 0 Filed at: 08/08/2020 1935   3a  Male UNDER 65: How often do you have five or more drinks on one occasion? 0 Filed at: 08/08/2020 1935   3b  FEMALE Any Age, or MALE 65+: How often do you have 4 or more drinks on one occassion? 0 Filed at: 08/08/2020 1935   Audit-C Score  0 Filed at: 08/08/2020 1935                  DARSHAN/DAST-10      Most Recent Value   How many times in the past year have you       Used an illegal drug or used a prescription medication for non-medical reasons? Never Filed at: 08/08/2020 1935                                [facility]  Number of Diagnoses or Management Options     Amount and/or Complexity of Data Reviewed  Clinical lab tests: reviewed  Obtain history from someone other than the patient: yes          Disposition  Final diagnoses: Altered mental status, unspecified altered mental status type   Syncope     Time reflects when diagnosis was documented in both MDM as applicable and the Disposition within this note     Time User Action Codes Description Comment    8/8/2020  7:56 PM Ritchie Anderson Add [R41 82] Altered mental status, unspecified altered mental status type     8/8/2020  7:56 PM Ritchie Anderson Add [R55] Syncope       ED Disposition     ED Disposition Condition Date/Time Comment    Admit Stable Sat Aug 8, 2020  8:35 PM Case was discussed with Olivia Atwood and the patient's admission status was agreed to be to the service of Dr Zelalem Snyder    None         Patient's Medications   Discharge Prescriptions    No medications on file     No discharge procedures on file      PDMP Review       Value Time User    PDMP Reviewed  Yes 2/19/2020  9:54 AM Duncan Gilman MD          ED Provider  Electronically Signed by           Xi Ceballos MD  08/08/20 [id number] Aníbal Hutchisnon MD  08/08/20 1755

## 2020-08-09 VITALS
DIASTOLIC BLOOD PRESSURE: 87 MMHG | SYSTOLIC BLOOD PRESSURE: 145 MMHG | RESPIRATION RATE: 20 BRPM | BODY MASS INDEX: 31 KG/M2 | WEIGHT: 228.84 LBS | HEIGHT: 72 IN | OXYGEN SATURATION: 96 % | TEMPERATURE: 98.1 F | HEART RATE: 70 BPM

## 2020-08-09 PROBLEM — N17.9 AKI (ACUTE KIDNEY INJURY) (HCC): Status: ACTIVE | Noted: 2020-08-09

## 2020-08-09 LAB
ANION GAP SERPL CALCULATED.3IONS-SCNC: 8 MMOL/L (ref 4–13)
ATRIAL RATE: 57 BPM
BUN SERPL-MCNC: 31 MG/DL (ref 5–25)
CALCIUM SERPL-MCNC: 9 MG/DL (ref 8.3–10.1)
CHLORIDE SERPL-SCNC: 107 MMOL/L (ref 100–108)
CO2 SERPL-SCNC: 28 MMOL/L (ref 21–32)
CREAT SERPL-MCNC: 1.32 MG/DL (ref 0.6–1.3)
ERYTHROCYTE [DISTWIDTH] IN BLOOD BY AUTOMATED COUNT: 12.7 % (ref 11.6–15.1)
GFR SERPL CREATININE-BSD FRML MDRD: 55 ML/MIN/1.73SQ M
GLUCOSE P FAST SERPL-MCNC: 125 MG/DL (ref 65–99)
GLUCOSE SERPL-MCNC: 125 MG/DL (ref 65–140)
HCT VFR BLD AUTO: 39.3 % (ref 36.5–49.3)
HGB BLD-MCNC: 13.7 G/DL (ref 12–17)
MCH RBC QN AUTO: 31.1 PG (ref 26.8–34.3)
MCHC RBC AUTO-ENTMCNC: 34.9 G/DL (ref 31.4–37.4)
MCV RBC AUTO: 89 FL (ref 82–98)
P AXIS: 54 DEGREES
PLATELET # BLD AUTO: 125 THOUSANDS/UL (ref 149–390)
PMV BLD AUTO: 9.5 FL (ref 8.9–12.7)
POTASSIUM SERPL-SCNC: 4.2 MMOL/L (ref 3.5–5.3)
PR INTERVAL: 150 MS
QRS AXIS: -19 DEGREES
QRSD INTERVAL: 88 MS
QT INTERVAL: 436 MS
QTC INTERVAL: 424 MS
RBC # BLD AUTO: 4.4 MILLION/UL (ref 3.88–5.62)
SODIUM SERPL-SCNC: 143 MMOL/L (ref 136–145)
T WAVE AXIS: 47 DEGREES
TROPONIN I SERPL-MCNC: <0.02 NG/ML
VENTRICULAR RATE: 57 BPM
WBC # BLD AUTO: 6.15 THOUSAND/UL (ref 4.31–10.16)

## 2020-08-09 PROCEDURE — 96361 HYDRATE IV INFUSION ADD-ON: CPT

## 2020-08-09 PROCEDURE — 84484 ASSAY OF TROPONIN QUANT: CPT | Performed by: NURSE PRACTITIONER

## 2020-08-09 PROCEDURE — 80048 BASIC METABOLIC PNL TOTAL CA: CPT | Performed by: NURSE PRACTITIONER

## 2020-08-09 PROCEDURE — 85027 COMPLETE CBC AUTOMATED: CPT | Performed by: NURSE PRACTITIONER

## 2020-08-09 PROCEDURE — 99217 PR OBSERVATION CARE DISCHARGE MANAGEMENT: CPT | Performed by: FAMILY MEDICINE

## 2020-08-09 PROCEDURE — 93010 ELECTROCARDIOGRAM REPORT: CPT | Performed by: INTERNAL MEDICINE

## 2020-08-09 RX ADMIN — GABAPENTIN 300 MG: 300 CAPSULE ORAL at 08:44

## 2020-08-09 RX ADMIN — MULTIPLE VITAMINS W/ MINERALS TAB 1 TABLET: TAB at 08:44

## 2020-08-09 RX ADMIN — CITALOPRAM HYDROBROMIDE 20 MG: 20 TABLET ORAL at 08:44

## 2020-08-09 RX ADMIN — OXYBUTYNIN CHLORIDE 5 MG: 5 TABLET ORAL at 08:43

## 2020-08-09 RX ADMIN — LISINOPRIL 20 MG: 20 TABLET ORAL at 08:44

## 2020-08-09 NOTE — ASSESSMENT & PLAN NOTE
· Unknown trip and fall versus syncopal episode  · Patient remembers getting up off of the floor in the garage and going into the house but does not remember falling  · Oozing from hematoma on scalp, localized wound care and pressure dressing applied    · History of Von Willebrand's disease  · CT brain shows parietal hematoma, no intracranial abnormality  · Q 4 hours neuro check  · Repeat CT immediately for any neurological change

## 2020-08-09 NOTE — ASSESSMENT & PLAN NOTE
· Present on admission  · History of central cord syndrome with frequent falls  · Complain of mild headache  · Neurological exam at baseline, no new focal deficits  · CT brain, parietal hematoma  · Admit observation with telemetry  · Possible syncope or trip and fall  · Q 4 hours neuro check

## 2020-08-09 NOTE — H&P
H&P- Yadira Tamayo 1950, 77 y o  male MRN: [id number]    Unit/Bed#: -01 Encounter: [id number]    Primary Care Provider: Aranza Vasquez MD   Date and time admitted to hospital: 8/8/2020  7:34 PM    * Syncope and collapse  Assessment & Plan  · Present on admission  · History of central cord syndrome with frequent falls  · Patient's wife was at grocery store when she received a call from the patient that he was covered in blood and did not know how it happened  · Pool of blood was found in the garage and hematoma on patient's right parietal scalp  · At time of admission patient began to remember events, specifically getting up from the ground in the garage and walking into the house to call his wife  · Complain of mild headache  · Neurological exam at baseline, no new focal deficits  · CT brain, parietal hematoma  · Admit observation with telemetry  · Possible syncope or trip and fall  · Q 4 hours neuro check    Traumatic hematoma of scalp  Assessment & Plan  · Unknown trip and fall versus syncopal episode  · Patient remembers getting up off of the floor in the garage and going into the house but does not remember falling  · Oozing from hematoma on scalp, localized wound care and pressure dressing applied  · History of Von Willebrand's disease  · CT brain shows parietal hematoma, no intracranial abnormality  · Q 4 hours neuro check  · Repeat CT immediately for any neurological change    Neuropathy  Assessment & Plan  · Continue gabapentin t i d      Central cord syndrome [facility])  Assessment & Plan  · Status post fall in 2000, C2 cervical injury with central cord syndrome treated at [facility] with subsequent rehabilitation in [facility]  · Able to ambulate independently but does fall frequently  · Left-sided weakness  · Decreased sensation bilateral lower extremities  · Neurogenic bladder, patient self catheterizes    Generalized anxiety disorder  Assessment & Plan  · Continue lorazepam p r n  And Celexa 20 mg daily    Essential hypertension  Assessment & Plan  · Continue lisinopril 20 mg daily  · Trend blood pressures    VTE Prophylaxis: Pharmacologic VTE Prophylaxis contraindicated due to Esperanza Yun Willebrand's disease  / sequential compression device   Code Status:  Full  POLST: POLST form is not discussed and not completed at this time  Discussion with family:  Wife at bedside    Anticipated Length of Stay:  Patient will be admitted on an Observation basis with an anticipated length of stay of  < 2 midnights  Justification for Hospital Stay:  Syncopal episode versus fall with head injury    Total Time for Visit, including Counseling / Coordination of Care: 45 minutes  Greater than 50% of this total time spent on direct patient counseling and coordination of care  Chief Complaint:   Fall, scalp hematoma    History of Present Illness:    Courtney Kaur is a 77 y o  male who presents with history of central cord syndrome after C2 injury in 2000  Patient was initially treated at [place] [address] unit and then for 8 months at [facility] after which he was able to ambulate but continues with decreased sensation in lower extremities  No longer trach/PEG  History of Von Willebrand's disease per wife  Today wife was at grocery store when she received a phone call from  reporting he had blood on his shirt but did not know where it came from  The wife sent daughter to the home where there was found to be a large pool of blood in the garage where it appeared he would have fallen and hit his head on a pipe  Patient was brought to the emergency department where he underwent CT scan of the brain showing no acute intracranial abnormality only a hematoma of the parietal scalp  Laboratory analysis unremarkable  Patient still unable to recall details of the event was presented for admission possible syncopal episode    Upon arrival to the floor he was able to recall getting up off of the garage floor and calling his wife  Review of Systems:    Review of Systems   Constitutional: Negative for activity change, appetite change, chills, diaphoresis, fatigue, fever and unexpected weight change  HENT: Negative for congestion, sore throat and trouble swallowing  Eyes: Negative for visual disturbance  Respiratory: Negative for cough and chest tightness  Cardiovascular: Negative for chest pain, palpitations and leg swelling  Gastrointestinal: Negative for abdominal distention, abdominal pain, constipation, diarrhea, nausea and vomiting  Endocrine: Negative for polydipsia, polyphagia and polyuria  Genitourinary: Negative for decreased urine volume, difficulty urinating and dysuria  Musculoskeletal: Negative for arthralgias, gait problem and myalgias  Skin: Positive for wound  Negative for rash  Allergic/Immunologic: Negative for immunocompromised state  Neurological: Positive for headaches  Negative for dizziness, seizures, syncope, weakness and numbness  Lower extremity decreased sensation and weakness at baseline  Left upper extremity decreased function at baseline   Hematological: Does not bruise/bleed easily  Psychiatric/Behavioral: Negative for sleep disturbance  The patient is not nervous/anxious  All other systems reviewed and are negative  Past Medical and Surgical History:     Past Medical History:   Diagnosis Date    Allergic rhinitis     [facility] [address] [facility]     Depression     Essential hypertension     Self-catheterizes urinary bladder     Von Willebrand disease ([facility] [address] )     Pt requires DDAVP prior to "major" surgery        Past Surgical History:   Procedure Laterality Date    CHOLECYSTECTOMY      FEEDING TUBE PLACEMENT         Meds/Allergies:    Prior to Admission medications    Medication Sig Start Date End Date Taking?  Authorizing Provider   acetaminophen (TYLENOL) 500 mg tablet Take 1,000 mg by mouth 2 (two) times a day   Yes Historical Provider, MD   citalopram (CeleXA) 20 mg tablet Take 1 tablet (20 mg total) by mouth daily 2/19/20  Yes Modesta Cushing, MD   gabapentin (Neurontin) 300 mg capsule Take 1 capsule (300 mg total) by mouth 3 (three) times a day 2/19/20 2/18/21 Yes Modesta Cushing, MD   glucosamine-chondroitin 500-400 MG tablet Take 1 tablet by mouth daily   Yes Historical Provider, MD   lisinopril (ZESTRIL) 20 mg tablet Take 1 tablet (20 mg total) by mouth daily 2/19/20  Yes Modesta Cushing, MD   LORazepam (ATIVAN) 0 5 mg tablet Take 1 tablet (0 5 mg total) by mouth daily as needed for anxiety 2/19/20  Yes Modesta Cushing, MD   meloxicam [facility]) 15 mg tablet Take 1 tablet (15 mg total) by mouth daily 2/19/20 2/18/21 Yes Modesta Cushing, MD   Multiple Vitamins-Minerals (MULTIVITAMIN ADULTS PO) Take 1 tablet by mouth daily   Yes Historical Provider, MD   oxybutynin (DITROPAN) 5 mg tablet Take 1 tablet (5 mg total) by mouth 2 (two) times a day 2/19/20  Yes Modesta Cushing, MD     I have reviewed home medications with patient family member  Allergies:    Allergies   Allergen Reactions    Penicillins Other (See Comments)     RASH, hives; valentín PIP 5/22/01 jgo    Sulfamethoxazole-Trimethoprim Other (See Comments)     developed rash       Social History:     Marital Status: /Civil Union   Occupation:  Disabled  Patient Pre-hospital Living Situation:  With spouse  Patient Pre-hospital Level of Mobility:  Independent  Patient Pre-hospital Diet Restrictions:  None  Substance Use History:   Social History     Substance and Sexual Activity   Alcohol Use Not Currently    Comment: Rarely      Social History     Tobacco Use   Smoking Status Former Smoker    Packs/day: 1 00    Years: 20 00    Pack years: 20 00    Types: Cigarettes   Smokeless Tobacco Never Used     Social History     Substance and Sexual Activity   Drug Use Never       Family History:    Family History   Problem Relation Age of Onset    Diabetes Mother     Hypertension Mother     Hyperlipidemia Mother     Diabetes Father     Hypertension Father     Hyperlipidemia Father        Physical Exam:     Vitals:   Blood Pressure: 160/94 (08/08/20 2103)  Pulse: 62 (08/08/20 2103)  Temperature: 98 1 °F (36 7 °C) (08/08/20 2103)  Temp Source: Temporal (08/08/20 2034)  Respirations: 18 (08/08/20 2103)  Height: 6' (182 9 cm) (08/08/20 1937)  Weight - Scale: 104 kg (228 lb 13 4 oz) (08/08/20 1937)  SpO2: 96 % (08/08/20 2103)    Physical Exam  Vitals signs and nursing note reviewed  Constitutional:       Appearance: Normal appearance  He is normal weight  HENT:      Head: Normocephalic  Abrasion and contusion present  Eyes:      Conjunctiva/sclera: Conjunctivae normal    Neck:      Musculoskeletal: Normal range of motion and neck supple  Cardiovascular:      Rate and Rhythm: Normal rate and regular rhythm  Pulses: Normal pulses  Heart sounds: Normal heart sounds  Pulmonary:      Effort: Pulmonary effort is normal       Breath sounds: Normal breath sounds  Abdominal:      General: Abdomen is flat  Palpations: Abdomen is soft  Tenderness: There is no abdominal tenderness  Musculoskeletal:      Comments: Extremities decreased sensation and decreased strength at baseline   Skin:     General: Skin is warm and dry  Capillary Refill: Capillary refill takes less than 2 seconds  Neurological:      Mental Status: He is alert and oriented to person, place, and time  Mental status is at baseline     Psychiatric:         Mood and Affect: Mood normal          Behavior: Behavior normal          Additional Data:     Lab Results: I have personally reviewed pertinent films in PACS    Results from last 7 days   Lab Units 08/08/20  1950   WBC Thousand/uL 7 85   HEMOGLOBIN g/dL 14 1   HEMATOCRIT % 40 2   PLATELETS Thousands/uL 133*   NEUTROS PCT % 81*   LYMPHS PCT % 14   MONOS PCT % 3*   EOS PCT % 1     Results from last 7 days   Lab Units 08/08/20 1950   SODIUM mmol/L 141   POTASSIUM mmol/L 4 7   CHLORIDE mmol/L 102   CO2 mmol/L 32   BUN mg/dL 32*   CREATININE mg/dL 1 41*   ANION GAP mmol/L 7   CALCIUM mg/dL 9 3   ALBUMIN g/dL 4 3   TOTAL BILIRUBIN mg/dL 1 07*   ALK PHOS U/L 66   ALT U/L 37   AST U/L 21   GLUCOSE RANDOM mg/dL 153*     Results from last 7 days   Lab Units 08/08/20 1950   INR  1 00             Results from last 7 days   Lab Units 08/08/20 1950   LACTIC ACID mmol/L 1 0       Imaging: I have personally reviewed pertinent films in PACS    CT head without contrast   Final Result by Allen Hoyt MD (08/08 2025)      1  Right parietal soft tissue swelling/hematoma  2   No intracranial hemorrhage or calvarial fracture  Workstation performed: [id number]         CT cervical spine without contrast   Final Result by Allen Hoyt MD (08/08 2025)      No cervical spine fracture or traumatic malalignment  Workstation performed: [id number]             EKG, Pathology, and Other Studies Reviewed on Admission:   EKG:  Sinus rhythm 60 beats per minute without ectopy or ST changes  Allscripts / Epic Records Reviewed: Yes     ** Please Note: This note has been constructed using a voice recognition system   **

## 2020-08-09 NOTE — PLAN OF CARE
Problem: Potential for Falls  Goal: Patient will remain free of falls  Description: INTERVENTIONS:  - Assess patient frequently for physical needs  -  Identify cognitive and physical deficits and behaviors that affect risk of falls    -  [place] fall precautions as indicated by assessment   - Educate patient/family on patient safety including physical limitations  - Instruct patient to call for assistance with activity based on assessment  - Modify environment to reduce risk of injury  - Consider OT/PT consult to assist with strengthening/mobility  Outcome: Not Progressing     Problem: PAIN - ADULT  Goal: Verbalizes/displays adequate comfort level or baseline comfort level  Description: Interventions:  - Encourage patient to monitor pain and request assistance  - Assess pain using appropriate pain scale  - Administer analgesics based on type and severity of pain and evaluate response  - Implement non-pharmacological measures as appropriate and evaluate response  - Consider cultural and social influences on pain and pain management  - Notify physician/advanced practitioner if interventions unsuccessful or patient reports new pain  Outcome: Not Progressing     Problem: INFECTION - ADULT  Goal: Absence or prevention of progression during hospitalization  Description: INTERVENTIONS:  - Assess and monitor for signs and symptoms of infection  - Monitor lab/diagnostic results  - Monitor all insertion sites, i e  indwelling lines, tubes, and drains  - Monitor endotracheal if appropriate and nasal secretions for changes in amount and color  - [place] appropriate cooling/warming therapies per order  - Administer medications as ordered  - Instruct and encourage patient and family to use good hand hygiene technique  - Identify and instruct in appropriate isolation precautions for identified infection/condition  Outcome: Not Progressing  Goal: Absence of fever/infection during neutropenic period  Description: INTERVENTIONS:  - Monitor WBC    Outcome: Not Progressing     Problem: SAFETY ADULT  Goal: Patient will remain free of falls  Description: INTERVENTIONS:  - Assess patient frequently for physical needs  -  Identify cognitive and physical deficits and behaviors that affect risk of falls    -  [place] fall precautions as indicated by assessment   - Educate patient/family on patient safety including physical limitations  - Instruct patient to call for assistance with activity based on assessment  - Modify environment to reduce risk of injury  - Consider OT/PT consult to assist with strengthening/mobility  Outcome: Not Progressing  Goal: Maintain or return to baseline ADL function  Description: INTERVENTIONS:  -  Assess patient's ability to carry out ADLs; assess patient's baseline for ADL function and identify physical deficits which impact ability to perform ADLs (bathing, care of mouth/teeth, toileting, grooming, dressing, etc )  - Assess/evaluate cause of self-care deficits   - Assess range of motion  - Assess patient's mobility; develop plan if impaired  - Assess patient's need for assistive devices and provide as appropriate  - Encourage maximum independence but intervene and supervise when necessary  - Involve family in performance of ADLs  - Assess for home care needs following discharge   - Consider OT consult to assist with ADL evaluation and planning for discharge  - Provide patient education as appropriate  Outcome: Not Progressing  Goal: Maintain or return mobility status to optimal level  Description: INTERVENTIONS:  - Assess patient's baseline mobility status (ambulation, transfers, stairs, etc )    - Identify cognitive and physical deficits and behaviors that affect mobility  - Identify mobility aids required to assist with transfers and/or ambulation (gait belt, sit-to-stand, lift, walker, cane, etc )  - [place] fall precautions as indicated by assessment  - Record patient progress and toleration of activity level on Mobility SBAR; progress patient to next Phase/Stage  - Instruct patient to call for assistance with activity based on assessment  - Consider rehabilitation consult to assist with strengthening/weightbearing, etc   Outcome: Not Progressing     Problem: DISCHARGE PLANNING  Goal: Discharge to home or other facility with appropriate resources  Description: INTERVENTIONS:  - Identify barriers to discharge w/patient and caregiver  - Arrange for needed discharge resources and transportation as appropriate  - Identify discharge learning needs (meds, wound care, etc )  - Arrange for interpretive services to assist at discharge as needed  - Refer to Case Management Department for coordinating discharge planning if the patient needs post-hospital services based on physician/advanced practitioner order or complex needs related to functional status, cognitive ability, or social support system  Outcome: Not Progressing     Problem: Knowledge Deficit  Goal: Patient/family/caregiver demonstrates understanding of disease process, treatment plan, medications, and discharge instructions  Description: Complete learning assessment and assess knowledge base    Interventions:  - Provide teaching at level of understanding  - Provide teaching via preferred learning methods  Outcome: Not Progressing

## 2020-08-09 NOTE — UTILIZATION REVIEW
Initial Clinical Review    Admission: Date/Time/Statement:   Admission Orders (From admission, onward)     Ordered        08/08/20 2036  Place in Observation (expected length of stay for this patient is less than two midnights)  Once         08/08/20 2023  Update to Emergency Patient Class  Once                   Orders Placed This Encounter   Procedures    Update to Emergency Patient Class     The Inpatient Order was placed in error  The Provider did not intend to Admit the patient to an Inpatient status  Standing Status:   Standing     Number of Occurrences:   1    Place in Observation (expected length of stay for this patient is less than two midnights)     Standing Status:   Standing     Number of Occurrences:   1     Order Specific Question:   Admitting Physician     Answer:   Alistair Ledesma [[id number]]     Order Specific Question:   Level of Care     Answer:   Med Surg [16]     ED Arrival Information     Expected Arrival Acuity Means of Arrival Escorted By Service Admission Type    - 8/8/2020 19:30 Emergent Wheelchair Family Member Hospitalist Emergency    Arrival Complaint    disoriented        Chief Complaint   Patient presents with    Altered Mental Status     Patient called wife with confusion  Patient then reported to wife that he may have fallen  Wife came home and found bloody shirt in bathroom but patient could not recall events  Assessment/Plan: 77 y o  male who presents with history of central cord syndrome after C2 injury in 2000  Patient was initially treated at [place] [address] unit and then for 8 months at [facility] after which he was able to ambulate but continues with decreased sensation in lower extremities  No longer trach/PEG  History of Von Willebrand's disease per wife  Today wife was at grocery store when she received a phone call from  reporting he had blood on his shirt but did not know where it came from    The wife sent daughter to the home where there was found to be a large pool of blood in the garage where it appeared he would have fallen and hit his head on a pipe  Patient was brought to the emergency department where he underwent CT scan of the brain showing no acute intracranial abnormality only a hematoma of the parietal scalp  Laboratory analysis unremarkable  Patient still unable to recall details of the event was presented for admission possible syncopal episode  Upon arrival to the floor he was able to recall getting up off of the garage floor and calling his wife       ED Triage Vitals [08/08/20 1937]   Temperature Pulse Respirations Blood Pressure SpO2   (!) 97 4 °F (36 3 °C) 78 20 168/91 96 %      Temp Source Heart Rate Source Patient Position - Orthostatic VS BP Location FiO2 (%)   Temporal Monitor Lying Right arm --      Pain Score       2          Wt Readings from Last 1 Encounters:   08/08/20 104 kg (228 lb 13 4 oz)     Additional Vital Signs:   Date/Time   Temp   Pulse   Resp   BP   MAP (mmHg)   SpO2   O2 Device   Patient Position - Orthostatic VS    08/09/20 06:51:30   97 8 °F (36 6 °C)   61   18   146/89   108   99 %          08/09/20 03:31:08   98 1 °F (36 7 °C)   70   16   131/73   92   98 %          08/08/20 2216                     None (Room air)       08/08/20 21:03:37   98 1 °F (36 7 °C)   62   18   160/94   116   96 %          08/08/20 2034   98 3 °F (36 8 °C)   64   18   168/82      98 %   None (Room air)   Lying    08/08/20 1954                     None (Room air)           Pertinent Labs/Diagnostic Test Results:       Results from last 7 days   Lab Units 08/09/20  0651 08/08/20  1950   WBC Thousand/uL 6 15 7 85   HEMOGLOBIN g/dL 13 7 14 1   HEMATOCRIT % 39 3 40 2   PLATELETS Thousands/uL 125* 133*   NEUTROS ABS Thousands/µL  --  6 29     Results from last 7 days   Lab Units 08/09/20  0601 08/08/20  1950   SODIUM mmol/L 143 141   POTASSIUM mmol/L 4 2 4 7   CHLORIDE mmol/L 107 102   CO2 mmol/L 28 32   ANION GAP mmol/L 8 7   BUN mg/dL 31* 32*   CREATININE mg/dL 1 32* 1 41*   EGFR ml/min/1 73sq m 55 51   CALCIUM mg/dL 9 0 9 3     Results from last 7 days   Lab Units 08/08/20 1950   AST U/L 21   ALT U/L 37   ALK PHOS U/L 66   TOTAL PROTEIN g/dL 7 4   ALBUMIN g/dL 4 3   TOTAL BILIRUBIN mg/dL 1 07*         Results from last 7 days   Lab Units 08/09/20  0601 08/08/20 1950   GLUCOSE RANDOM mg/dL 125 153*     Results from last 7 days   Lab Units 08/09/20  0601 08/08/20 1950   TROPONIN I ng/mL <0 02 <0 02     Results from last 7 days   Lab Units 08/08/20 1950   PROTIME seconds 13 0   INR  1 00   PTT seconds 47*     Results from last 7 days   Lab Units 08/08/20 1950   LACTIC ACID mmol/L 1 0     Results from last 7 days   Lab Units 08/08/20 1950   LIPASE u/L 134       ED Treatment:   Medication Administration from 08/08/2020 1928 to 08/08/2020 2058       Date/Time Order Dose Route Action     08/08/2020 1954 sodium chloride 0 9 % bolus 1,000 mL 1,000 mL Intravenous New Bag        Past Medical History:   Diagnosis Date    Allergic rhinitis     [facility] [address] [facility]     Depression     Essential hypertension     Self-catheterizes urinary bladder     Von Willebrand disease ([facility] [address] )     Pt requires DDAVP prior to "major" surgery      Present on Admission:   Central cord syndrome ([facility] [address] [facility]   Essential hypertension   Generalized anxiety disorder   Neuropathy   Syncope and collapse   Traumatic hematoma of scalp      Admitting Diagnosis: Syncope [R55]  Altered mental status [R41 82]  Altered mental status, unspecified altered mental status type [R41 82]  Age/Sex: 77 y o  male  Admission Orders:  Scheduled Medications:  citalopram, 20 mg, Oral, Daily  gabapentin, 300 mg, Oral, TID  lisinopril, 20 mg, Oral, Daily  multivitamin-minerals, 1 tablet, Oral, Daily  oxybutynin, 5 mg, Oral, BID      Continuous IV Infusions:     PRN Meds:  acetaminophen, 650 mg, Oral, Q6H PRN  LORazepam, 0 5 mg, Oral, Daily PRN        IP CONSULT TO CASE MANAGEMENT   24 telemetry  PT/OT  SCD  Neuro q 4 hrs     Network Utilization Review Department  [email or web address]  ATTENTION: Please call with any questions or concerns to [phone] and carefully listen to the prompts so that you are directed to the right person  All voicemails are confidential   Suma Luisanapper all requests for admission clinical reviews, approved or denied determinations and any other requests to dedicated fax number below belonging to the campus where the patient is receiving treatment   List of dedicated fax numbers for the Facilities:  [address] DENIALS (Administrative/Medical Necessity) [phone]   [address] (Maternity/NICU/Pediatrics) [phone]   Leilani Perez [phone]   Refugio Samson [phone]   Charlotte Delacruz [phone]   Dougie No [phone]   [address] [place] [address] [phone]   [facility]  [phone]   [address] [facility] [address]  [address] [facility] [address] [facility] [phone]

## 2020-08-09 NOTE — DISCHARGE SUMMARY
Discharge- [facility] 1950, 77 y o  male MRN: [id number]    Unit/Bed#: -01 Encounter: [id number]    Primary Care Provider: Nolon Seip, MD   Date and time admitted to hospital: 8/8/2020  7:34 PM        ALVARO (acute kidney injury) [facility])  Assessment & Plan  On admission creatinine was 1 41  Today creatinine is 1 32  Baseline creatinine is 1 23  Advised the patient to increase p o  Hydration    Syncope and collapse  Assessment & Plan  · Present on admission  · History of central cord syndrome with frequent falls  · Complain of mild headache  · Neurological exam at baseline, no new focal deficits  · CT brain, parietal hematoma  · Admit observation with telemetry  · Possible syncope or trip and fall  · Q 4 hours neuro check    Traumatic hematoma of scalp  Assessment & Plan  · Unknown trip and fall versus syncopal episode  · Patient remembers getting up off of the floor in the garage and going into the house but does not remember falling  · Oozing from hematoma on scalp, localized wound care and pressure dressing applied  · History of Von Willebrand's disease  · CT brain shows parietal hematoma, no intracranial abnormality  · Q 4 hours neuro check  · Repeat CT immediately for any neurological change    Neuropathy  Assessment & Plan  · Continue gabapentin t i d  Central cord syndrome [facility])  Assessment & Plan  · Status post fall in 2000, C2 cervical injury with central cord syndrome treated at [facility] with subsequent rehabilitation in [facility]  · Able to ambulate independently but does fall frequently  · Left-sided weakness  · Decreased sensation bilateral lower extremities  · Neurogenic bladder, patient self catheterizes    Generalized anxiety disorder  Assessment & Plan  · Continue lorazepam p r n   And Celexa 20 mg daily    Essential hypertension  Assessment & Plan  · Continue lisinopril 20 mg daily  · Trend blood pressures    Discharging Physician / Practitioner: Mine Rutherford MD  PCP: Hudson Porter MD  Admission Date:   Admission Orders (From admission, onward)     Ordered        08/08/20 2036  Place in Observation (expected length of stay for this patient is less than two midnights)  Once         08/08/20 2023  Update to Emergency Patient Class  Once                   Discharge Date: 08/09/20    Resolved Problems  Date Reviewed: 8/8/2020    None          Consultations During Hospital Stay:  · None    Procedures Performed:   · CT head without contrast:1   Right parietal soft tissue swelling/hematoma  2   No intracranial hemorrhage or calvarial fracture  · CT cervical spine without contrast:No cervical spine fracture or traumatic malalignment  Significant Findings / Test Results:   Lab Results   Component Value Date    WBC 6 15 08/09/2020    HGB 13 7 08/09/2020    HCT 39 3 08/09/2020    MCV 89 08/09/2020     (L) 08/09/2020     Lab Results   Component Value Date    SODIUM 143 08/09/2020    K 4 2 08/09/2020     08/09/2020    CO2 28 08/09/2020    AGAP 8 08/09/2020    BUN 31 (H) 08/09/2020    CREATININE 1 32 (H) 08/09/2020    GLUC 125 08/09/2020    GLUF 125 (H) 08/09/2020    CALCIUM 9 0 08/09/2020    AST 21 08/08/2020    ALT 37 08/08/2020    ALKPHOS 66 08/08/2020    TP 7 4 08/08/2020    TBILI 1 07 (H) 08/08/2020    EGFR 55 08/09/2020     ·     Incidental Findings:   · As mentioned above     Test Results Pending at Discharge (will require follow up): · None     Outpatient Tests Requested:  · None    Complications:  None    Reason for Admission:  Scalp hematoma after mechanical fall    Hospital Course:     Tatianna Mckeon is a 77 y o  male patient who originally presented to the hospital on 8/8/2020 due to scalp hematoma mechanical fall  Patient admitted under syncope protocol  All the lab results came back normal except ALVARO  Patient advised to increase p o  Hydration  CT scan of head shows very total hematoma    Patient also has linear laceration on the right parietal area without any significant bleeding  Patient observed in the hospital and telemetry, no significant telemetric event noted  Patient neuro exam remained stable  Patient will be discharged home with home medication  Patient is to follow-up with PCP in 2-3 days  Regular wound care  Please see above list of diagnoses and related plan for additional information  Condition at Discharge: stable     Discharge Day Visit / Exam:     Subjective:  Seen and evaluated during the round  Patient denies any significant complaint  Vitals: Blood Pressure: 145/87 (08/09/20 1055)  Pulse: 70 (08/09/20 1055)  Temperature: 98 1 °F (36 7 °C) (08/09/20 1055)  Temp Source: Temporal (08/08/20 2034)  Respirations: 20 (08/09/20 1055)  Height: 6' (182 9 cm) (08/08/20 1937)  Weight - Scale: 104 kg (228 lb 13 4 oz) (08/08/20 1937)  SpO2: 96 % (08/09/20 1055)  Exam:   Physical Exam  Vitals signs and nursing note reviewed  Exam conducted with a chaperone present  Constitutional:       Appearance: He is not diaphoretic  HENT:      Head:        Nose: Nose normal       Mouth/Throat:      Mouth: Mucous membranes are moist       Pharynx: No oropharyngeal exudate  Eyes:      Conjunctiva/sclera: Conjunctivae normal       Pupils: Pupils are equal, round, and reactive to light  Neck:      Musculoskeletal: Normal range of motion and neck supple  Cardiovascular:      Rate and Rhythm: Normal rate and regular rhythm  Heart sounds: No murmur  No gallop  Pulmonary:      Effort: Pulmonary effort is normal  No respiratory distress  Breath sounds: Normal breath sounds  No wheezing  Abdominal:      General: Abdomen is flat  Bowel sounds are normal    Musculoskeletal:         General: No tenderness  Right lower leg: No edema  Left lower leg: No edema  Skin:     General: Skin is warm  Neurological:      Mental Status: He is alert and oriented to person, place, and time   Mental status is at baseline  Comments: Patient has residual defect on the left upper extremity with contracture from previous central cord symptoms   Psychiatric:         Mood and Affect: Mood normal          Discussion with Family: none    Discharge instructions/Information to patient and family:   See after visit summary for information provided to patient and family  Provisions for Follow-Up Care:  See after visit summary for information related to follow-up care and any pertinent home health orders  Disposition:     Home    For Discharges to [address] [facility]:   · Not Applicable to this Patient - Not Applicable to this Patient    Planned Readmission:  If symptoms get worse     Discharge Statement:  I spent >35 minutes discharging the patient  This time was spent on the day of discharge  I had direct contact with the patient on the day of discharge  Greater than 50% of the total time was spent examining patient, answering all patient questions, arranging and discussing plan of care with patient as well as directly providing post-discharge instructions  Additional time then spent on discharge activities  Discharge Medications:  See after visit summary for reconciled discharge medications provided to patient and family        ** Please Note: This note has been constructed using a voice recognition system **

## 2020-08-09 NOTE — NURSING NOTE
Peripheral IV removed  AVS printed and reviewed with pt  Belongings sent with pt  All questions answered  Accompanied off unit in w/c

## 2020-08-09 NOTE — ASSESSMENT & PLAN NOTE
On admission creatinine was 1 41  Today creatinine is 1 32  Baseline creatinine is 1 23  Advised the patient to increase p o   Hydration

## 2020-08-09 NOTE — ASSESSMENT & PLAN NOTE
· Status post fall in 2000, C2 cervical injury with central cord syndrome treated at [facility] with subsequent rehabilitation in [facility]  · Able to ambulate independently but does fall frequently  · Left-sided weakness  · Decreased sensation bilateral lower extremities  · Neurogenic bladder, patient self catheterizes

## 2020-08-09 NOTE — ASSESSMENT & PLAN NOTE
· Status post fall in 2000, C2 cervical injury with central cord syndrome treated at [facility] with subsequent rehabilitation in [facility]  · Able to ambulate independently but does fall frequently  · Left-sided weakness  · Decreased sensation bilateral lower extremities  · Neurogenic bladder, patient self catheterizes

## 2020-08-09 NOTE — DISCHARGE INSTRUCTIONS
Acute Wound Care   AMBULATORY CARE:   An acute wound  is an injury that causes a break in the skin  An acute wound can happen suddenly, last a short time, and may heal on its own  Common signs and symptoms of an acute wound:   · A cut, tear, or gash in your skin    · Bleeding, swelling, pain, or trouble moving the affected area    · Dirt or foreign objects inside the wound     · Milky, yellow, green, or brown pus in the wound     · Red, tender, or warm area around the pus    · Fever  Seek care immediately if:   · You have pus or a foul odor coming from the wound  · You have sudden trouble breathing or chest pain  · Blood soaks through your bandage  Contact your healthcare provider if:   · You have muscle, joint, or body aches, sweating, or a fever  · You have more swelling, redness, or bleeding in your wound  · Your skin is itchy, swollen, or you have a rash  · You have questions or concerns about your condition or care  Treatment for an acute wound  may include any of the following:  · Cleansing  is done with soap and water to wash away germs and decrease the risk of infection  Sterile water further cleans the wound  The cleaning is done under high pressure with a catheter tip and large syringe  A solution that kills germs may also be used  · Debridement  is done to clean and remove objects, dirt, or dead tissues from the open wound  Healthcare providers may also drain the wound to clean out pus  · Closure of the wound  is done with stitches, staples, skin adhesive, or other treatments  This may be done if the wound is wide or deep  Stitches may be needed if the wound is in an area that moves a lot, such as the hands, feet, and joints  Stitches may help to keep the wound from getting infected  They may also decrease the amount of scarring you have  Some wounds may heal better without stitches    Wound care:   · If your wound was closed with thin strips of medical tape, keep them clean and dry  The strips of medical tape will fall off on their own  Do not pull them off  · Keep the bandage clean and dry  Do not remove the bandage over your wound unless your healthcare provider says it is okay  · Wash your hands before and after you take care of your wound to prevent infection  · Clean the wound as directed  If you cannot reach the wound, have someone help you  · If you have packing, make sure all the gauze used to pack the wound is taken out and replaced as directed  Keep track of how many gauze dressings are placed inside the wound  Follow up with your healthcare provider as directed:  Write down your questions so you remember to ask them during your visits  © 2016 [address] is for End User's use only and may not be sold, redistributed or otherwise used for commercial purposes  All illustrations and images included in CareNotes® are the copyrighted property of [employer]  or [employer]  The above information is an  only  It is not intended as medical advice for individual conditions or treatments  Talk to your doctor, nurse or pharmacist before following any medical regimen to see if it is safe and effective for you  Contusion in Adults, Ambulatory Care   GENERAL INFORMATION:   A contusion  is a bruise that appears on your skin after an injury  A bruise happens when small blood vessels tear but skin does not  When blood vessels tear, blood leaks into nearby tissue, such as soft tissue or muscle    Common symptoms include the following:   · Pain that increases when you touch the bruise, walk, or use the area around the bruise    · Swelling or a lump at the site of the bruise or near it    · Red, blue, or black skin that may change to green or yellow after a few days    · Stiffness or problems moving the bruised area of your body  Seek immediate care for the following symptoms:   · New difficulty moving your injured area    · Tingling or numbness in or near the injured area    · Hand or foot below the bruise gets cold or turns pale  Treatment for a contusion  may include any of the following:  · NSAIDs  help decrease swelling and pain or fever  This medicine is available with or without a doctor's order  NSAIDs can cause stomach bleeding or kidney problems in certain people  If you take blood thinner medicine, always ask your healthcare provider if NSAIDs are safe for you  Always read the medicine label and follow directions  · Pain medicine  to decrease or take away pain  Do not wait until the pain is severe before you take your medicine  · Aspiration  to drain pooled blood in your muscle may be done to help prevent increased pressure in the muscle  · Surgery  may be done to repair a tear in the muscle or relieve pressure in the muscle caused by swelling  Care for a contusion:   · Rest the injured area  or use it less than usual  If you bruised your leg or foot, you may need crutches or a cane to help you walk  This will help you keep weight off your injured body part  Use crutches or a cane as directed  · Use ice  to decrease swelling and pain  Ice may also help prevent tissue damage  Use an ice pack, or put crushed ice in a plastic bag  Cover it with a towel and place it on your bruise for 15 to 20 minutes every hour or as directed  · Use Compression  An elastic bandage may be wrapped around a bruised muscle to support the area and decrease swelling  Make sure the bandage is not too tight  You should be able to fit 1 finger between the bandage and your skin  · Elevate (raise) your injured body part  above the level of your heart to help decrease pain and swelling  Use pillows, blankets, or rolled towels to elevate the area as often as you can  · Do not massage or use heat  Heat and massage may slow healing of the area  · Do not drink alcohol  Alcohol may slow healing of your injury      · Do not stretch injured muscles  Ask your healthcare provider when and how you may safely stretch after your injury  Prevent a contusion:   · Stretch and warm up before you play sports or exercise  · Wear protective gear when you play sports  Examples are shin guards and padding  · If you begin a new physical activity, start slowly to give your body a chance to adjust   Follow up with your healthcare provider as directed:  Write down your questions so you remember to ask them during your visits  CARE AGREEMENT:   You have the right to help plan your care  Learn about your health condition and how it may be treated  Discuss treatment options with your caregivers to decide what care you want to receive  You always have the right to refuse treatment  The above information is an  only  It is not intended as medical advice for individual conditions or treatments  Talk to your doctor, nurse or pharmacist before following any medical regimen to see if it is safe and effective for you  © 2014 [address] is for End User's use only and may not be sold, redistributed or otherwise used for commercial purposes  All illustrations and images included in CareNotes® are the copyrighted property of A [employer] M [employer]  or [employer]

## 2020-08-09 NOTE — PLAN OF CARE
Problem: Potential for Falls  Goal: Patient will remain free of falls  Description: INTERVENTIONS:  - Assess patient frequently for physical needs  -  Identify cognitive and physical deficits and behaviors that affect risk of falls    -  [place] fall precautions as indicated by assessment   - Educate patient/family on patient safety including physical limitations  - Instruct patient to call for assistance with activity based on assessment  - Modify environment to reduce risk of injury  - Consider OT/PT consult to assist with strengthening/mobility  Outcome: Progressing     Problem: PAIN - ADULT  Goal: Verbalizes/displays adequate comfort level or baseline comfort level  Description: Interventions:  - Encourage patient to monitor pain and request assistance  - Assess pain using appropriate pain scale  - Administer analgesics based on type and severity of pain and evaluate response  - Implement non-pharmacological measures as appropriate and evaluate response  - Consider cultural and social influences on pain and pain management  - Notify physician/advanced practitioner if interventions unsuccessful or patient reports new pain  Outcome: Progressing     Problem: INFECTION - ADULT  Goal: Absence or prevention of progression during hospitalization  Description: INTERVENTIONS:  - Assess and monitor for signs and symptoms of infection  - Monitor lab/diagnostic results  - Monitor all insertion sites, i e  indwelling lines, tubes, and drains  - Monitor endotracheal if appropriate and nasal secretions for changes in amount and color  - [place] appropriate cooling/warming therapies per order  - Administer medications as ordered  - Instruct and encourage patient and family to use good hand hygiene technique  - Identify and instruct in appropriate isolation precautions for identified infection/condition  Outcome: Progressing  Goal: Absence of fever/infection during neutropenic period  Description: INTERVENTIONS:  - Monitor WBC    Outcome: Progressing     Problem: SAFETY ADULT  Goal: Patient will remain free of falls  Description: INTERVENTIONS:  - Assess patient frequently for physical needs  -  Identify cognitive and physical deficits and behaviors that affect risk of falls    -  [place] fall precautions as indicated by assessment   - Educate patient/family on patient safety including physical limitations  - Instruct patient to call for assistance with activity based on assessment  - Modify environment to reduce risk of injury  - Consider OT/PT consult to assist with strengthening/mobility  Outcome: Progressing  Goal: Maintain or return to baseline ADL function  Description: INTERVENTIONS:  -  Assess patient's ability to carry out ADLs; assess patient's baseline for ADL function and identify physical deficits which impact ability to perform ADLs (bathing, care of mouth/teeth, toileting, grooming, dressing, etc )  - Assess/evaluate cause of self-care deficits   - Assess range of motion  - Assess patient's mobility; develop plan if impaired  - Assess patient's need for assistive devices and provide as appropriate  - Encourage maximum independence but intervene and supervise when necessary  - Involve family in performance of ADLs  - Assess for home care needs following discharge   - Consider OT consult to assist with ADL evaluation and planning for discharge  - Provide patient education as appropriate  Outcome: Progressing  Goal: Maintain or return mobility status to optimal level  Description: INTERVENTIONS:  - Assess patient's baseline mobility status (ambulation, transfers, stairs, etc )    - Identify cognitive and physical deficits and behaviors that affect mobility  - Identify mobility aids required to assist with transfers and/or ambulation (gait belt, sit-to-stand, lift, walker, cane, etc )  - [place] fall precautions as indicated by assessment  - Record patient progress and toleration of activity level on Mobility SBAR; progress patient to next Phase/Stage  - Instruct patient to call for assistance with activity based on assessment  - Consider rehabilitation consult to assist with strengthening/weightbearing, etc   Outcome: Progressing     Problem: DISCHARGE PLANNING  Goal: Discharge to home or other facility with appropriate resources  Description: INTERVENTIONS:  - Identify barriers to discharge w/patient and caregiver  - Arrange for needed discharge resources and transportation as appropriate  - Identify discharge learning needs (meds, wound care, etc )  - Arrange for interpretive services to assist at discharge as needed  - Refer to Case Management Department for coordinating discharge planning if the patient needs post-hospital services based on physician/advanced practitioner order or complex needs related to functional status, cognitive ability, or social support system  Outcome: Progressing     Problem: Knowledge Deficit  Goal: Patient/family/caregiver demonstrates understanding of disease process, treatment plan, medications, and discharge instructions  Description: Complete learning assessment and assess knowledge base    Interventions:  - Provide teaching at level of understanding  - Provide teaching via preferred learning methods  Outcome: Progressing

## 2020-08-10 ENCOUNTER — TRANSITIONAL CARE MANAGEMENT (OUTPATIENT)
Dept: FAMILY MEDICINE CLINIC | Facility: CLINIC | Age: 70
End: 2020-08-10

## 2020-08-12 ENCOUNTER — OFFICE VISIT (OUTPATIENT)
Dept: FAMILY MEDICINE CLINIC | Facility: CLINIC | Age: 70
End: 2020-08-12
Payer: MEDICARE

## 2020-08-12 VITALS
SYSTOLIC BLOOD PRESSURE: 128 MMHG | HEIGHT: 72 IN | DIASTOLIC BLOOD PRESSURE: 78 MMHG | TEMPERATURE: 97.8 F | BODY MASS INDEX: 30.75 KG/M2 | WEIGHT: 227 LBS

## 2020-08-12 DIAGNOSIS — S14.129S CENTRAL CORD SYNDROME, SEQUELA (HCC): Chronic | ICD-10-CM

## 2020-08-12 DIAGNOSIS — R55 SYNCOPE AND COLLAPSE: Primary | ICD-10-CM

## 2020-08-12 DIAGNOSIS — E11.9 TYPE 2 DIABETES MELLITUS WITHOUT COMPLICATION, WITHOUT LONG-TERM CURRENT USE OF INSULIN (HCC): Chronic | ICD-10-CM

## 2020-08-12 DIAGNOSIS — N17.9 AKI (ACUTE KIDNEY INJURY) (HCC): ICD-10-CM

## 2020-08-12 DIAGNOSIS — I10 ESSENTIAL HYPERTENSION: Chronic | ICD-10-CM

## 2020-08-12 DIAGNOSIS — G62.9 NEUROPATHY: Chronic | ICD-10-CM

## 2020-08-12 DIAGNOSIS — F41.1 GENERALIZED ANXIETY DISORDER: Chronic | ICD-10-CM

## 2020-08-12 PROCEDURE — 99496 TRANSJ CARE MGMT HIGH F2F 7D: CPT | Performed by: FAMILY MEDICINE

## 2020-08-12 NOTE — PROGRESS NOTES
Assessment/Plan:    Syncope and collapse  Reviewed the hospital reports and reconciled medication  After review I do not feel the patient had syncopal episode but rather tripped and fell striking his head and lost consciousness  Does not appear to have any significant head injury but should watch out for any developing headache or visual symptoms continue current meds as is  He did rectify the safety problem in his garage    Type 2 diabetes mellitus without complication, without long-term current use of insulin ([facility] [address] )  Has shown good control I do not feel this contributed to the incident  Lab Results   Component Value Date    HGBA1C 5 5 05/29/2020       Essential hypertension  Overall stable continue current regimen    Central cord syndrome ([address] )  Appears stable continue current regimen and follow-up    Neuropathy  Doing well continue current regimen    Generalized anxiety disorder  Doing well continue baseline meds  I do not feel this contributed to the incident    ALVARO (acute kidney injury) ([facility] [address] )  Shows mild renal failure will continue to follow at this time       Diagnoses and all orders for this visit:    Syncope and collapse    Type 2 diabetes mellitus without complication, without long-term current use of insulin ([facility] [address] )    Essential hypertension    Central cord syndrome, sequela (HCC)    Neuropathy    Generalized anxiety disorder    ALVARO (acute kidney injury) ([address] )          Subjective:      Patient ID: Kathy Hoffmann is a 77 y o  male  Patient has history of spinal cord injury with central cord syndrome and does self catheterization to pass urine  Also has history of hypertension diabetes mellitus adult, allergic rhinitis lower leg neuropathy and stress disorder was hospitalized after fall where he lost consciousness and was felt to be syncopal episode  Did strike the right back of his head and had bleeding  Workup was unremarkable    After patient went home he gradually regained his memory and recall that he had tripped over a rubber mat falling forward and striking his head and did not actually have syncope but loss of consciousness after head trauma  Other than itching in the area of the abrasion on the scalp has no other symptoms at this time      The following portions of the patient's history were reviewed and updated as appropriate: allergies, current medications, past medical history, past social history, past surgical history and problem list     Review of Systems   Constitutional: Negative for chills and fever  HENT: Negative for congestion  Eyes: Negative for visual disturbance  Respiratory: Negative for shortness of breath  Cardiovascular: Negative for chest pain, palpitations and leg swelling  Gastrointestinal: Negative for abdominal pain  Genitourinary: Positive for difficulty urinating ( does self catheterization)  Musculoskeletal: Negative for back pain  Skin: Positive for wound (On the right side of the scalp near the back)  Neurological: Negative for dizziness, light-headedness and headaches  Hematological: Negative for adenopathy  Psychiatric/Behavioral: Negative for sleep disturbance  Objective:      /78 (BP Location: Right arm, Patient Position: Sitting, Cuff Size: Standard)   Temp 97 8 °F (36 6 °C)   Ht 6' (1 829 m)   Wt 103 kg (227 lb)   BMI 30 79 kg/m²          Physical Exam  Vitals signs and nursing note reviewed  Constitutional:       Appearance: Normal appearance  He is well-developed  HENT:      Head: Normocephalic  Nose: Nose normal       Mouth/Throat:      Mouth: Mucous membranes are moist    Eyes:      Extraocular Movements: Extraocular movements intact  Conjunctiva/sclera: Conjunctivae normal       Pupils: Pupils are equal, round, and reactive to light  Neck:      Musculoskeletal: Normal range of motion and neck supple  Thyroid: No thyromegaly  Vascular: No carotid bruit     Cardiovascular:      Rate and Rhythm: Normal rate and regular rhythm  Heart sounds: Normal heart sounds  No murmur (Rate is 66)  Pulmonary:      Effort: Pulmonary effort is normal       Breath sounds: Normal breath sounds  Abdominal:      General: Abdomen is flat  There is no distension  Palpations: Abdomen is soft  There is no mass  Tenderness: There is no abdominal tenderness  Musculoskeletal: Normal range of motion  Lymphadenopathy:      Cervical: No cervical adenopathy  Skin:     General: Skin is warm and dry  Findings: No rash  Neurological:      General: No focal deficit present  Mental Status: He is alert and oriented to person, place, and time  Coordination: Romberg sign negative  Gait: Gait abnormal ( although this is baseline)  Deep Tendon Reflexes: Reflexes abnormal ( reflexes 3+)

## 2020-08-12 NOTE — PROGRESS NOTES
TCM Call (since 7/12/2020)     Date and time call was made  8/10/2020  9:54 AM    Hospital care reviewed  Records reviewed    Patient was hospitialized at  Other (comment)    Comment  [email or web address]    Date of Admission  08/08/20    Date of discharge  08/09/20    Diagnosis  Syncope and collapse    Disposition  Home    Were the patients medications reviewed and updated  No    Current Symptoms  None      TCM Call (since 7/12/2020)     Post hospital issues  None    Should patient be enrolled in anticoag monitoring? No    Scheduled for follow up?   Yes    Did you obtain your prescribed medications  Yes    Do you need help managing your prescriptions or medications  No    Is transportation to your appointment needed  No    I have advised the patient to call PCP with any new or worsening symptoms  Kitty Castaneda CMA    Living Arrangements  Spouse or Significiant other    Support System  Spouse    The type of support provided  Emotional; Financial; Physical    Do you have social support  Yes, as much as I need    Are you recieving any outpatient services  No    Are you recieving home care services  No    Are you using any community resources  No    Current waiver services  No    Have you fallen in the last 12 months  No    Interperter language line needed  No    Counseling  Patient

## 2020-08-12 NOTE — ASSESSMENT & PLAN NOTE
Reviewed the hospital reports and reconciled medication  After review I do not feel the patient had syncopal episode but rather tripped and fell striking his head and lost consciousness  Does not appear to have any significant head injury but should watch out for any developing headache or visual symptoms continue current meds as is    He did rectify the safety problem in his garage

## 2020-08-12 NOTE — PATIENT INSTRUCTIONS
Reviewed the hospital reports and reconciled medication with this review it seems actually that the patient tripped and then knocked himself about rather than passed and has been gathering increased recollection of this activity  Has no residual symptoms    May resume normal activity and he has improved the safety in the garage

## 2020-08-12 NOTE — ASSESSMENT & PLAN NOTE
Has shown good control I do not feel this contributed to the incident  Lab Results   Component Value Date    HGBA1C 5 5 05/29/2020

## 2020-08-21 DIAGNOSIS — F41.1 GENERALIZED ANXIETY DISORDER: Chronic | ICD-10-CM

## 2020-08-23 RX ORDER — LORAZEPAM 0.5 MG/1
TABLET ORAL
Qty: 90 TABLET | Refills: 1 | Status: SHIPPED | OUTPATIENT
Start: 2020-08-23 | End: 2021-08-02

## 2020-09-08 ENCOUNTER — OFFICE VISIT (OUTPATIENT)
Dept: FAMILY MEDICINE CLINIC | Facility: CLINIC | Age: 70
End: 2020-09-08
Payer: MEDICARE

## 2020-09-08 VITALS
DIASTOLIC BLOOD PRESSURE: 74 MMHG | HEIGHT: 72 IN | BODY MASS INDEX: 30.48 KG/M2 | WEIGHT: 225 LBS | SYSTOLIC BLOOD PRESSURE: 124 MMHG

## 2020-09-08 DIAGNOSIS — J30.1 SEASONAL ALLERGIC RHINITIS DUE TO POLLEN: Chronic | ICD-10-CM

## 2020-09-08 DIAGNOSIS — S14.129S CENTRAL CORD SYNDROME, SEQUELA (HCC): Chronic | ICD-10-CM

## 2020-09-08 DIAGNOSIS — I10 ESSENTIAL HYPERTENSION: Chronic | ICD-10-CM

## 2020-09-08 DIAGNOSIS — E11.9 TYPE 2 DIABETES MELLITUS WITHOUT COMPLICATION, WITHOUT LONG-TERM CURRENT USE OF INSULIN (HCC): Primary | Chronic | ICD-10-CM

## 2020-09-08 DIAGNOSIS — F41.1 GENERALIZED ANXIETY DISORDER: Chronic | ICD-10-CM

## 2020-09-08 PROBLEM — S00.03XA TRAUMATIC HEMATOMA OF SCALP: Status: RESOLVED | Noted: 2020-08-08 | Resolved: 2020-09-08

## 2020-09-08 PROBLEM — R55 SYNCOPE AND COLLAPSE: Status: RESOLVED | Noted: 2020-08-08 | Resolved: 2020-09-08

## 2020-09-08 LAB — SL AMB POCT HEMOGLOBIN AIC: 5.2 (ref ?–6.5)

## 2020-09-08 PROCEDURE — 83036 HEMOGLOBIN GLYCOSYLATED A1C: CPT | Performed by: FAMILY MEDICINE

## 2020-09-08 PROCEDURE — 99214 OFFICE O/P EST MOD 30 MIN: CPT | Performed by: FAMILY MEDICINE

## 2020-09-08 NOTE — ASSESSMENT & PLAN NOTE
Diabetic control remains excellent    Continue to watch starch in diet and push walking activity  Lab Results   Component Value Date    HGBA1C 5 2 09/08/2020

## 2020-09-08 NOTE — PROGRESS NOTES
Assessment/Plan:    Type 2 diabetes mellitus without complication, without long-term current use of insulin ([facility] [address] )  Diabetic control remains excellent  Continue to watch starch in diet and push walking activity  Lab Results   Component Value Date    HGBA1C 5 2 09/08/2020       Essential hypertension  Overall doing well  Continue current regimen    Central cord syndrome (HCC)  Appears stable  Continue current regimen and follow-up    Seasonal allergic rhinitis due to pollen  Overall doing well  Continue p r n  Meds    Generalized anxiety disorder  Overall doing well  Continue current regimen    Neuropathy  Stable, continue current regimen       Diagnoses and all orders for this visit:    Type 2 diabetes mellitus without complication, without long-term current use of insulin (HCC)  -     POCT hemoglobin A1c    Essential hypertension    Central cord syndrome, sequela (HCC)    Seasonal allergic rhinitis due to pollen    Generalized anxiety disorder          Subjective:      Patient ID: Kristine Jerome is a 77 y o  male  Patient has history of spinal cord injury with central cord syndrome and does self catheterization to pass urine  Also has history of hypertension diabetes mellitus adult, allergic rhinitis lower leg neuropathy and stress disorde overall has been feeling well      The following portions of the patient's history were reviewed and updated as appropriate: allergies, current medications, past medical history, past social history and problem list       Review of Systems   Constitutional: Negative for activity change, appetite change, chills, fatigue, fever and unexpected weight change  HENT: Negative for congestion, rhinorrhea, trouble swallowing and voice change  Eyes: Positive for itching  Negative for visual disturbance  Respiratory: Negative for apnea, cough, chest tightness and shortness of breath  Cardiovascular: Negative for chest pain, palpitations and leg swelling  Gastrointestinal: Positive for constipation  Negative for abdominal distention, abdominal pain and diarrhea  Endocrine: Negative for polyuria  Genitourinary: Positive for difficulty urinating (Does self catheterization 4 times a day)  Negative for enuresis  Musculoskeletal: Positive for arthralgias and gait problem  Negative for myalgias  Skin: Negative for rash  Allergic/Immunologic: Positive for environmental allergies  Neurological: Negative for dizziness, weakness, light-headedness, numbness and headaches  Hematological: Negative for adenopathy  Psychiatric/Behavioral: Negative for agitation and sleep disturbance  Objective:      /74 (BP Location: Left arm, Patient Position: Sitting, Cuff Size: Standard)   Ht 6' (1 829 m)   Wt 102 kg (225 lb)   BMI 30 52 kg/m²          Physical Exam  Vitals signs and nursing note reviewed  Constitutional:       Appearance: Normal appearance  He is well-developed  HENT:      Head: Normocephalic  Eyes:      Conjunctiva/sclera: Conjunctivae normal       Pupils: Pupils are equal, round, and reactive to light  Neck:      Musculoskeletal: Normal range of motion and neck supple  Thyroid: No thyromegaly  Vascular: No carotid bruit  Cardiovascular:      Rate and Rhythm: Normal rate and regular rhythm  Heart sounds: Normal heart sounds  No murmur ( rate is 72)  Pulmonary:      Effort: Pulmonary effort is normal       Breath sounds: Normal breath sounds  Abdominal:      General: Abdomen is flat  There is no distension  Palpations: Abdomen is soft  There is no mass  Tenderness: There is no abdominal tenderness  Musculoskeletal: Normal range of motion  Right lower leg: No edema  Left lower leg: No edema  Lymphadenopathy:      Cervical: No cervical adenopathy  Skin:     General: Skin is warm and dry  Findings: No rash     Neurological:      Mental Status: He is alert and oriented to person, place, and time  Coordination: Coordination abnormal       Gait: Gait abnormal       Deep Tendon Reflexes: Reflexes abnormal (Reflexes 4+)  Psychiatric:         Mood and Affect: Mood normal          Behavior: Behavior normal          Thought Content:  Thought content normal          Judgment: Judgment normal

## 2020-09-08 NOTE — PATIENT INSTRUCTIONS
Overall doing well  Will continue all meds as is  Continue to push walking activity and watch the starch in the diet    Should get flu shot in the fall

## 2020-11-23 ENCOUNTER — TRANSCRIBE ORDERS (OUTPATIENT)
Dept: ADMINISTRATIVE | Facility: HOSPITAL | Age: 70
End: 2020-11-23

## 2020-11-23 ENCOUNTER — LAB (OUTPATIENT)
Dept: LAB | Facility: HOSPITAL | Age: 70
End: 2020-11-23
Payer: MEDICARE

## 2020-11-23 DIAGNOSIS — N40.1 BENIGN PROSTATIC HYPERPLASIA WITH LOWER URINARY TRACT SYMPTOMS, SYMPTOM DETAILS UNSPECIFIED: ICD-10-CM

## 2020-11-23 DIAGNOSIS — N40.1 BENIGN PROSTATIC HYPERPLASIA WITH LOWER URINARY TRACT SYMPTOMS, SYMPTOM DETAILS UNSPECIFIED: Primary | ICD-10-CM

## 2020-11-23 PROCEDURE — 84154 ASSAY OF PSA FREE: CPT

## 2020-11-23 PROCEDURE — 84153 ASSAY OF PSA TOTAL: CPT

## 2020-11-23 PROCEDURE — 36415 COLL VENOUS BLD VENIPUNCTURE: CPT

## 2020-11-25 LAB
PSA FREE MFR SERPL: 12.5 %
PSA FREE SERPL-MCNC: 0.1 NG/ML
PSA SERPL-MCNC: 0.8 NG/ML (ref 0–4)

## 2020-12-09 ENCOUNTER — OFFICE VISIT (OUTPATIENT)
Dept: FAMILY MEDICINE CLINIC | Facility: CLINIC | Age: 70
End: 2020-12-09
Payer: MEDICARE

## 2020-12-09 VITALS
BODY MASS INDEX: 30.31 KG/M2 | TEMPERATURE: 94.8 F | WEIGHT: 223.8 LBS | SYSTOLIC BLOOD PRESSURE: 124 MMHG | DIASTOLIC BLOOD PRESSURE: 80 MMHG | HEIGHT: 72 IN

## 2020-12-09 DIAGNOSIS — G82.50 QUADRIPLEGIA (HCC): ICD-10-CM

## 2020-12-09 DIAGNOSIS — F41.1 GENERALIZED ANXIETY DISORDER: Chronic | ICD-10-CM

## 2020-12-09 DIAGNOSIS — E11.9 TYPE 2 DIABETES MELLITUS WITHOUT COMPLICATION, WITHOUT LONG-TERM CURRENT USE OF INSULIN (HCC): Chronic | ICD-10-CM

## 2020-12-09 DIAGNOSIS — S14.129S CENTRAL CORD SYNDROME, SEQUELA (HCC): Chronic | ICD-10-CM

## 2020-12-09 DIAGNOSIS — J30.1 SEASONAL ALLERGIC RHINITIS DUE TO POLLEN: Chronic | ICD-10-CM

## 2020-12-09 DIAGNOSIS — I10 ESSENTIAL HYPERTENSION: Primary | Chronic | ICD-10-CM

## 2020-12-09 DIAGNOSIS — Z23 NEEDS FLU SHOT: ICD-10-CM

## 2020-12-09 DIAGNOSIS — D69.6 PLATELETS DECREASED (HCC): ICD-10-CM

## 2020-12-09 DIAGNOSIS — G62.9 NEUROPATHY: Chronic | ICD-10-CM

## 2020-12-09 PROCEDURE — G0008 ADMIN INFLUENZA VIRUS VAC: HCPCS | Performed by: FAMILY MEDICINE

## 2020-12-09 PROCEDURE — G0439 PPPS, SUBSEQ VISIT: HCPCS | Performed by: FAMILY MEDICINE

## 2020-12-09 PROCEDURE — 90662 IIV NO PRSV INCREASED AG IM: CPT | Performed by: FAMILY MEDICINE

## 2020-12-09 PROCEDURE — 99214 OFFICE O/P EST MOD 30 MIN: CPT | Performed by: FAMILY MEDICINE

## 2021-01-01 DIAGNOSIS — S14.129S CENTRAL CORD SYNDROME, SEQUELA (HCC): Chronic | ICD-10-CM

## 2021-01-01 DIAGNOSIS — F41.1 GENERALIZED ANXIETY DISORDER: Chronic | ICD-10-CM

## 2021-01-01 DIAGNOSIS — I10 ESSENTIAL HYPERTENSION: Chronic | ICD-10-CM

## 2021-01-02 RX ORDER — OXYBUTYNIN CHLORIDE 5 MG/1
TABLET ORAL
Qty: 180 TABLET | Refills: 3 | Status: SHIPPED | OUTPATIENT
Start: 2021-01-02 | End: 2022-04-25

## 2021-01-02 RX ORDER — CITALOPRAM 20 MG/1
TABLET ORAL
Qty: 90 TABLET | Refills: 3 | Status: SHIPPED | OUTPATIENT
Start: 2021-01-02 | End: 2022-04-25

## 2021-01-02 RX ORDER — LISINOPRIL 20 MG/1
TABLET ORAL
Qty: 90 TABLET | Refills: 3 | Status: SHIPPED | OUTPATIENT
Start: 2021-01-02 | End: 2022-04-25

## 2021-03-10 ENCOUNTER — APPOINTMENT (OUTPATIENT)
Dept: LAB | Facility: HOSPITAL | Age: 71
End: 2021-03-10
Attending: FAMILY MEDICINE
Payer: MEDICARE

## 2021-03-10 DIAGNOSIS — Z23 ENCOUNTER FOR IMMUNIZATION: ICD-10-CM

## 2021-03-10 DIAGNOSIS — E11.9 TYPE 2 DIABETES MELLITUS WITHOUT COMPLICATION, WITHOUT LONG-TERM CURRENT USE OF INSULIN (HCC): Chronic | ICD-10-CM

## 2021-03-10 LAB
ALBUMIN SERPL BCP-MCNC: 4.1 G/DL (ref 3.5–5)
ANION GAP SERPL CALCULATED.3IONS-SCNC: 5 MMOL/L (ref 4–13)
BUN SERPL-MCNC: 34 MG/DL (ref 5–25)
CALCIUM SERPL-MCNC: 9.1 MG/DL (ref 8.3–10.1)
CHLORIDE SERPL-SCNC: 105 MMOL/L (ref 100–108)
CHOLEST SERPL-MCNC: 155 MG/DL (ref 50–200)
CO2 SERPL-SCNC: 29 MMOL/L (ref 21–32)
CREAT SERPL-MCNC: 1.08 MG/DL (ref 0.6–1.3)
EST. AVERAGE GLUCOSE BLD GHB EST-MCNC: 97 MG/DL
GFR SERPL CREATININE-BSD FRML MDRD: 70 ML/MIN/1.73SQ M
GLUCOSE P FAST SERPL-MCNC: 99 MG/DL (ref 65–99)
HBA1C MFR BLD: 5 %
HDLC SERPL-MCNC: 43 MG/DL
LDLC SERPL CALC-MCNC: 98 MG/DL (ref 0–100)
NONHDLC SERPL-MCNC: 112 MG/DL
PHOSPHATE SERPL-MCNC: 3.7 MG/DL (ref 2.3–4.1)
POTASSIUM SERPL-SCNC: 4.5 MMOL/L (ref 3.5–5.3)
SODIUM SERPL-SCNC: 139 MMOL/L (ref 136–145)
TRIGL SERPL-MCNC: 68 MG/DL

## 2021-03-10 PROCEDURE — 36415 COLL VENOUS BLD VENIPUNCTURE: CPT

## 2021-03-10 PROCEDURE — 80069 RENAL FUNCTION PANEL: CPT

## 2021-03-10 PROCEDURE — 80061 LIPID PANEL: CPT

## 2021-03-10 PROCEDURE — 83036 HEMOGLOBIN GLYCOSYLATED A1C: CPT

## 2021-03-15 ENCOUNTER — OFFICE VISIT (OUTPATIENT)
Dept: FAMILY MEDICINE CLINIC | Facility: CLINIC | Age: 71
End: 2021-03-15
Payer: MEDICARE

## 2021-03-15 VITALS
DIASTOLIC BLOOD PRESSURE: 74 MMHG | SYSTOLIC BLOOD PRESSURE: 124 MMHG | HEIGHT: 72 IN | TEMPERATURE: 97.8 F | BODY MASS INDEX: 30.61 KG/M2 | WEIGHT: 226 LBS

## 2021-03-15 DIAGNOSIS — E11.9 TYPE 2 DIABETES MELLITUS WITHOUT COMPLICATION, WITHOUT LONG-TERM CURRENT USE OF INSULIN (HCC): Chronic | ICD-10-CM

## 2021-03-15 DIAGNOSIS — S14.129S CENTRAL CORD SYNDROME, SEQUELA (HCC): Chronic | ICD-10-CM

## 2021-03-15 DIAGNOSIS — F41.1 GENERALIZED ANXIETY DISORDER: Chronic | ICD-10-CM

## 2021-03-15 DIAGNOSIS — G62.9 NEUROPATHY: Chronic | ICD-10-CM

## 2021-03-15 DIAGNOSIS — G82.50 QUADRIPLEGIA (HCC): ICD-10-CM

## 2021-03-15 DIAGNOSIS — I10 ESSENTIAL HYPERTENSION: Primary | Chronic | ICD-10-CM

## 2021-03-15 PROCEDURE — 99214 OFFICE O/P EST MOD 30 MIN: CPT | Performed by: FAMILY MEDICINE

## 2021-03-15 NOTE — ASSESSMENT & PLAN NOTE
Reviewed labs, A1c remains excellent    Watch sweets and starch in diet and push walking activity  Lab Results   Component Value Date    HGBA1C 5 0 03/10/2021

## 2021-03-15 NOTE — PATIENT INSTRUCTIONS
Overall seems to be doing well  Try to get a little more walking activity in as the weather changes  And watch starch in the diet  Continue all meds as is    Will check A1c today

## 2021-03-15 NOTE — PROGRESS NOTES
Assessment/Plan:    Essential hypertension   Overall stable, continue current regimen    Type 2 diabetes mellitus without complication, without long-term current use of insulin ([facility] [address] )   Reviewed labs, A1c remains excellent  Watch sweets and starch in diet and push walking activity  Lab Results   Component Value Date    HGBA1C 5 0 03/10/2021       Generalized anxiety disorder   Overall doing well, continue current regimen    Neuropathy   Doing well, continue current meds and follow-up    Quadriplegia ([facility] [address] )   Functioning well, continue to push activity as tolerated  Continue overall follow-up    Central cord syndrome (HCC)   Overall seems stable  Continue the self catheterization       Diagnoses and all orders for this visit:    Essential hypertension    Type 2 diabetes mellitus without complication, without long-term current use of insulin (HCC)    Generalized anxiety disorder    Neuropathy    Quadriplegia (HCC)    Central cord syndrome, sequela (HCC)    Other orders  -     Cancel: Hepatitis C Antibody ([facility]); Future  -     Cancel: PNEUMOCOCCAL POLYSACCHARIDE VACCINE 23-VALENT =>3YO SQ IM  -     Cancel: Hemoglobin A1C ([facility]); Future  -     Cancel: POCT hemoglobin A1c          Subjective:      Patient ID: Andre Qureshi is a 72 y o  male  Patient has history of spinal cord injury with central cord syndrome and does self catheterization to pass urine  Also has history of hypertension diabetes mellitus adult, allergic rhinitis lower leg neuropathy and stress disorde overall has been feeling well      The following portions of the patient's history were reviewed and updated as appropriate: allergies, current medications, past medical history, past social history and problem list BMI Counseling: Body mass index is 30 65 kg/m²  The BMI is above normal  Nutrition recommendations include moderation in carbohydrate intake   Exercise recommendations include moderate physical activity 150 minutes/week  No pharmacotherapy was ordered  Falls Plan of Care: balance, strength, and gait training instructions were provided  Overall continues to function well  Continue follow-up      Review of Systems   Constitutional: Negative for activity change, appetite change, chills, fatigue, fever and unexpected weight change  HENT: Negative for congestion, rhinorrhea and trouble swallowing  Eyes: Negative for visual disturbance  Respiratory: Negative for apnea, cough, chest tightness and shortness of breath  Cardiovascular: Negative for chest pain, palpitations and leg swelling  Gastrointestinal: Negative for abdominal distention, abdominal pain, constipation and diarrhea  Endocrine: Negative for polyuria ( does self catheterization)  Genitourinary: Positive for difficulty urinating and enuresis  Musculoskeletal: Positive for arthralgias and gait problem  Negative for myalgias  Skin: Negative for rash  Allergic/Immunologic: Positive for environmental allergies  Neurological: Negative for dizziness, weakness, light-headedness, numbness and headaches  Hematological: Negative for adenopathy  Psychiatric/Behavioral: Negative for agitation, confusion, dysphoric mood and sleep disturbance  Objective:      /74 (BP Location: Left arm, Patient Position: Sitting, Cuff Size: Standard)   Temp 97 8 °F (36 6 °C)   Ht 6' (1 829 m)   Wt 103 kg (226 lb)   BMI 30 65 kg/m²          Physical Exam  Vitals signs and nursing note reviewed  Constitutional:       Appearance: Normal appearance  He is well-developed  HENT:      Head: Normocephalic  Eyes:      Conjunctiva/sclera: Conjunctivae normal    Neck:      Musculoskeletal: Normal range of motion and neck supple  Thyroid: No thyromegaly  Vascular: No carotid bruit  Cardiovascular:      Rate and Rhythm: Normal rate and regular rhythm  Heart sounds: Normal heart sounds  No murmur ( rate is 66)     Pulmonary: Effort: Pulmonary effort is normal       Breath sounds: Normal breath sounds  Abdominal:      General: Abdomen is flat  There is no distension  Palpations: Abdomen is soft  There is no mass  Tenderness: There is no abdominal tenderness  Musculoskeletal: Normal range of motion  Right lower leg: No edema  Left lower leg: No edema  Lymphadenopathy:      Cervical: No cervical adenopathy  Skin:     General: Skin is warm and dry  Findings: No rash  Neurological:      Mental Status: He is alert and oriented to person, place, and time  Coordination: Coordination abnormal       Gait: Gait abnormal       Deep Tendon Reflexes: Reflexes abnormal ( reflexes 3+)  Psychiatric:         Mood and Affect: Mood normal          Behavior: Behavior normal          Thought Content:  Thought content normal          Judgment: Judgment normal

## 2021-06-15 ENCOUNTER — OFFICE VISIT (OUTPATIENT)
Dept: FAMILY MEDICINE CLINIC | Facility: CLINIC | Age: 71
End: 2021-06-15
Payer: MEDICARE

## 2021-06-15 VITALS
HEART RATE: 65 BPM | DIASTOLIC BLOOD PRESSURE: 72 MMHG | BODY MASS INDEX: 30.34 KG/M2 | WEIGHT: 224 LBS | OXYGEN SATURATION: 93 % | SYSTOLIC BLOOD PRESSURE: 122 MMHG | HEIGHT: 72 IN

## 2021-06-15 DIAGNOSIS — J30.1 SEASONAL ALLERGIC RHINITIS DUE TO POLLEN: Chronic | ICD-10-CM

## 2021-06-15 DIAGNOSIS — G62.9 NEUROPATHY: Chronic | ICD-10-CM

## 2021-06-15 DIAGNOSIS — F41.1 GENERALIZED ANXIETY DISORDER: Chronic | ICD-10-CM

## 2021-06-15 DIAGNOSIS — I10 ESSENTIAL HYPERTENSION: Primary | Chronic | ICD-10-CM

## 2021-06-15 DIAGNOSIS — E11.9 TYPE 2 DIABETES MELLITUS WITHOUT COMPLICATION, WITHOUT LONG-TERM CURRENT USE OF INSULIN (HCC): ICD-10-CM

## 2021-06-15 DIAGNOSIS — S14.129S CENTRAL CORD SYNDROME, SEQUELA (HCC): Chronic | ICD-10-CM

## 2021-06-15 PROBLEM — E11.42 DIABETIC POLYNEUROPATHY ASSOCIATED WITH TYPE 2 DIABETES MELLITUS (HCC): Status: ACTIVE | Noted: 2019-08-02

## 2021-06-15 PROCEDURE — 99214 OFFICE O/P EST MOD 30 MIN: CPT | Performed by: FAMILY MEDICINE

## 2021-06-15 NOTE — PROGRESS NOTES
Assessment/Plan:    Essential hypertension   Overall stable, continue current regimen    Central cord syndrome ([address] )   Does have muscle tightness  Continue current regimen and follow-up    Neuropathy   Seems to be doing well, continue current regimen    Generalized anxiety disorder   Overall doing well, continue current regimen    Seasonal allergic rhinitis due to pollen   Asymptomatic, use p r n  over-the-counter medication       Diagnoses and all orders for this visit:    Essential hypertension    Type 2 diabetes mellitus without complication, without long-term current use of insulin (HCC)    Central cord syndrome, sequela (HCC)    Neuropathy    Generalized anxiety disorder    Seasonal allergic rhinitis due to pollen    Other orders  -     Cancel: Hepatitis C Antibody ([facility]); Future  -     Cancel: POCT hemoglobin A1c          Subjective:      Patient ID: Kristine Jerome is a 72 y o  male  Patient has history of spinal cord injury with central cord syndrome and does self catheterization to pass urine  Also has history of hypertension diabetes mellitus adult, allergic rhinitis lower leg neuropathy, migraines, and stress disorder  Has not had trouble with the migraines for many years  Does get some tightness and some pain in the right low back and right leg although this seems to be helped if taking potassium inform of eating 1 banana a day      The following portions of the patient's history were reviewed and updated as appropriate: allergies, current medications, past medical history, past social history and problem list       Review of Systems   Constitutional: Negative for activity change, appetite change, chills, fatigue, fever and unexpected weight change  HENT: Negative for congestion, dental problem, hearing loss, rhinorrhea and trouble swallowing  Eyes: Negative for visual disturbance  Respiratory: Negative for apnea, cough, chest tightness and shortness of breath  Cardiovascular: Negative for chest pain, palpitations and leg swelling  Gastrointestinal: Negative for abdominal distention, abdominal pain, constipation and diarrhea  Endocrine: Negative for polyuria ( does self catheterization 4 times a day)  Genitourinary: Positive for difficulty urinating ( does self catheterization)  Negative for enuresis  Musculoskeletal: Positive for myalgias ( in the right leg)  Negative for arthralgias  Skin: Negative for rash  Allergic/Immunologic: Positive for environmental allergies  Neurological: Negative for dizziness, weakness, light-headedness, numbness and headaches  Hematological: Negative for adenopathy  Does not bruise/bleed easily  Psychiatric/Behavioral: Negative for agitation and sleep disturbance  Objective:      /72 (BP Location: Right arm, Patient Position: Sitting, Cuff Size: Standard)   Pulse 65   Ht 6' (1 829 m)   Wt 102 kg (224 lb)   SpO2 93%   BMI 30 38 kg/m²          Physical Exam  Vitals and nursing note reviewed  Constitutional:       Appearance: He is well-developed  HENT:      Head: Normocephalic  Eyes:      Conjunctiva/sclera: Conjunctivae normal    Neck:      Thyroid: No thyromegaly  Vascular: No carotid bruit  Cardiovascular:      Rate and Rhythm: Normal rate and regular rhythm  Heart sounds: Normal heart sounds  No murmur ( rate is 72) heard  Pulmonary:      Effort: Pulmonary effort is normal       Breath sounds: Normal breath sounds  Abdominal:      General: Abdomen is flat  There is no distension  Palpations: Abdomen is soft  There is no mass  Tenderness: There is no abdominal tenderness  Musculoskeletal:         General: Normal range of motion  Cervical back: Normal range of motion and neck supple  Right lower leg: No edema  Left lower leg: No edema  Lymphadenopathy:      Cervical: No cervical adenopathy  Skin:     General: Skin is warm and dry        Findings: No rash    Neurological:      Mental Status: He is alert and oriented to person, place, and time  Motor: No weakness  Coordination: Coordination abnormal       Gait: Gait abnormal       Deep Tendon Reflexes: Reflexes abnormal ( reflexes 3+)  Psychiatric:         Mood and Affect: Mood normal          Behavior: Behavior normal          Thought Content:  Thought content normal          Judgment: Judgment normal

## 2021-06-15 NOTE — PATIENT INSTRUCTIONS
Overall exam today looks good  It some problems with must I right leg    This does seem to be helped if eating a banana and should continue will continue all meds as is

## 2021-07-22 NOTE — PROGRESS NOTES
Assessment/Plan:    No problem-specific Assessment & Plan notes found for this encounter  Diagnoses and all orders for this visit:    Type 2 diabetes mellitus without complication, without long-term current use of insulin ([facility] )  Comments:  Has been showing excellent control with diet will recheck renal profile and A1c  Orders:  -     Renal function panel; Future  -     Lipid panel; Future  -     Hemoglobin A1C; Future    Essential hypertension  Comments:  Stable continue current regimen    Neuropathy  Comments:  Doing well continue current regimen    Seasonal allergic rhinitis due to pollen  Comments:  Intermittently symptomatic continue p r n  Meds    Generalized anxiety disorder  Comments:  Doing well continue current regimen    Central cord syndrome, sequela ([facility] )  Comments:  Functioning well continue follow-up and self-catheterizations for urine flow    Other orders  -     oxybutynin (DITROPAN) 5 mg tablet; Take 5 mg by mouth 2 (two) times a day  -     meloxicam (MOBIC) 15 mg tablet; Take 15 mg by mouth daily  -     LORazepam (ATIVAN) 0 5 mg tablet; Take 0 5 mg by mouth every 6 (six) hours as needed  -     lisinopril (ZESTRIL) 20 mg tablet; Take 20 mg by mouth daily  -     glucosamine-chondroitin 500-400 MG tablet; Take 1 tablet by mouth daily  -     gabapentin (NEURONTIN) 300 mg capsule; Take 300 mg by mouth Three times a day  -     citalopram (CeleXA) 20 mg tablet; Take 20 mg by mouth daily  -     acetaminophen (TYLENOL) 500 mg tablet; Take 1,000 mg by mouth 2 (two) times a day  -     Multiple Vitamins-Minerals (MULTIVITAMIN ADULTS PO); Take 1 tablet by mouth daily          Subjective:      Patient ID: Mike Osuna is a 80 y o  male  Patient has history of spinal cord injury with central cord syndrome and does self catheterization to pass urine  Also has history of hypertension diabetes mellitus adult, allergic rhinitis lower leg neuropathy and stress disorder  Overall has been doing well    Has not been very active      The following portions of the patient's history were reviewed and updated as appropriate: allergies, current medications, past family history, past medical history, past social history, past surgical history and problem list     Review of Systems   Constitutional: Positive for activity change and fatigue  Negative for appetite change, chills, fever and unexpected weight change  HENT: Negative for congestion and rhinorrhea  Eyes: Negative for visual disturbance  Respiratory: Negative for apnea, cough, chest tightness and shortness of breath  Cardiovascular: Negative for chest pain, palpitations and leg swelling  Gastrointestinal: Negative for abdominal distention, abdominal pain, constipation and diarrhea  Endocrine: Negative for polyuria  Genitourinary: Positive for difficulty urinating (Does self catheterization in order to pass urine)  Negative for enuresis  Musculoskeletal: Positive for arthralgias ( intermittent joint pain in the back and legs)  Negative for myalgias  Skin: Negative for rash  Allergic/Immunologic: Negative for environmental allergies  Neurological: Positive for weakness (In legs and arms post injuryIn his legs and arms)  Negative for dizziness, speech difficulty, light-headedness, numbness and headaches  Hematological: Negative for adenopathy  Does not bruise/bleed easily  Psychiatric/Behavioral: Negative for agitation  Objective:      /74 (BP Location: Right arm, Patient Position: Sitting, Cuff Size: Standard)   Ht 5' 9 34" (1 761 m)   Wt 103 kg (226 lb)   BMI 33 05 kg/m²          Physical Exam   Constitutional: He appears well-developed  HENT:   Head: Normocephalic  Nose: Nose normal    Mouth/Throat: Oropharynx is clear and moist    Eyes: Conjunctivae are normal    Neck: Normal range of motion  Neck supple  No thyromegaly present     Cardiovascular: Normal rate, regular rhythm, normal heart sounds and intact distal pulses  No murmur heard  Pulmonary/Chest: Effort normal and breath sounds normal    Abdominal: Soft  Bowel sounds are normal  He exhibits no distension and no mass  There is no tenderness  Musculoskeletal: He exhibits no edema  Lymphadenopathy:     He has no cervical adenopathy  Neurological: He is alert  He displays normal reflexes  Coordination abnormal    Skin: Skin is warm and dry  No rash noted  Psychiatric: He has a normal mood and affect  His behavior is normal    Nursing note and vitals reviewed  No

## 2021-08-23 ENCOUNTER — OFFICE VISIT (OUTPATIENT)
Dept: FAMILY MEDICINE CLINIC | Facility: CLINIC | Age: 71
End: 2021-08-23
Payer: MEDICARE

## 2021-08-23 VITALS
SYSTOLIC BLOOD PRESSURE: 120 MMHG | DIASTOLIC BLOOD PRESSURE: 66 MMHG | BODY MASS INDEX: 30.07 KG/M2 | HEIGHT: 72 IN | WEIGHT: 222 LBS

## 2021-08-23 DIAGNOSIS — H60.392 OTHER INFECTIVE ACUTE OTITIS EXTERNA OF LEFT EAR: Primary | ICD-10-CM

## 2021-08-23 PROCEDURE — 99213 OFFICE O/P EST LOW 20 MIN: CPT | Performed by: FAMILY MEDICINE

## 2021-08-23 NOTE — PROGRESS NOTES
Assessment/Plan:      Diagnoses and all orders for this visit:    Other infective acute otitis externa of left ear  Comments:   actually does seem to be improving  Will place on Cortisporin otic suspension to be used 2-3 drops through 4 times a day for the next week  Orders:  -     neomycin-polymyxin-hydrocortisone (CORTISPORIN) otic solution; Administer 4 drops into the left ear every 6 (six) hours          Subjective:     Patient ID: Jessenia Borden is a 72 y o  male  Patient has history of spinal cord injury with central cord syndrome and does self catheterization to pass urine  Also has history of hypertension diabetes mellitus adult, allergic rhinitis lower leg neuropathy, migraines, and stress disorder  Has been having trouble with left ear and left facial pain over the last week  Is not painful today at this time although had taken Tylenol      Review of Systems   Constitutional: Negative for fever  HENT: Positive for ear pain  Negative for dental problem, facial swelling ( but didHave some tenderness in front and below the ear area), hearing loss and sore throat  Eyes: Positive for itching  Respiratory: Positive for cough  Negative for shortness of breath  Gastrointestinal: Negative for abdominal pain and nausea  Skin: Negative for rash  Allergic/Immunologic: Positive for environmental allergies  Hematological: Negative for adenopathy  Psychiatric/Behavioral: Positive for sleep disturbance ( although okay at this time)  Objective:     Physical Exam  Vitals and nursing note reviewed  Constitutional:       Appearance: Normal appearance  He is well-developed  HENT:      Head: Normocephalic  Right Ear: Tympanic membrane, ear canal and external ear normal       Left Ear: Drainage ( serous type drainage in the canal) present  No tenderness  No middle ear effusion  Tympanic membrane is not perforated     Eyes:      Conjunctiva/sclera: Conjunctivae normal    Neck: Thyroid: No thyromegaly  Cardiovascular:      Rate and Rhythm: Normal rate and regular rhythm  Heart sounds: Normal heart sounds  No murmur ( rate is 72) heard  Pulmonary:      Effort: Pulmonary effort is normal       Breath sounds: Normal breath sounds  Abdominal:      General: Abdomen is flat  There is no distension  Palpations: Abdomen is soft  There is no mass  Tenderness: There is no abdominal tenderness  Musculoskeletal:         General: Normal range of motion  Cervical back: Normal range of motion and neck supple  Lymphadenopathy:      Cervical: No cervical adenopathy  Skin:     General: Skin is warm and dry  Findings: No rash  Neurological:      Mental Status: He is alert and oriented to person, place, and time     Psychiatric:         Mood and Affect: Mood normal

## 2021-08-23 NOTE — PATIENT INSTRUCTIONS
Discussed the problem with the ear pain  I feel has an infection in the outer ear canal and still has some drainage  Will place on Cortisporin otic suspension to be used 2 or 3 drops through 4 times a day for the next week    If continues to developed pain with this should call and would consider an oral antibiotic as backup

## 2021-08-25 ENCOUNTER — TELEPHONE (OUTPATIENT)
Dept: FAMILY MEDICINE CLINIC | Facility: CLINIC | Age: 71
End: 2021-08-25

## 2021-08-25 DIAGNOSIS — H60.392 OTHER INFECTIVE ACUTE OTITIS EXTERNA OF LEFT EAR: Primary | ICD-10-CM

## 2021-08-25 RX ORDER — LEVOFLOXACIN 500 MG/1
500 TABLET, FILM COATED ORAL EVERY 24 HOURS
Qty: 7 TABLET | Refills: 0 | Status: SHIPPED | OUTPATIENT
Start: 2021-08-25 | End: 2021-09-01

## 2021-08-25 NOTE — TELEPHONE ENCOUNTER
Patient called, hasn't had any relief with the ear drops  Was told to call if that was the case and you'd send in an antibiotic   Please advise

## 2021-09-15 ENCOUNTER — OFFICE VISIT (OUTPATIENT)
Dept: FAMILY MEDICINE CLINIC | Facility: CLINIC | Age: 71
End: 2021-09-15
Payer: MEDICARE

## 2021-09-15 VITALS
DIASTOLIC BLOOD PRESSURE: 68 MMHG | WEIGHT: 220 LBS | HEIGHT: 72 IN | SYSTOLIC BLOOD PRESSURE: 120 MMHG | BODY MASS INDEX: 29.8 KG/M2

## 2021-09-15 DIAGNOSIS — E11.42 DIABETIC POLYNEUROPATHY ASSOCIATED WITH TYPE 2 DIABETES MELLITUS (HCC): ICD-10-CM

## 2021-09-15 DIAGNOSIS — E11.9 TYPE 2 DIABETES MELLITUS WITHOUT COMPLICATION, WITHOUT LONG-TERM CURRENT USE OF INSULIN (HCC): ICD-10-CM

## 2021-09-15 DIAGNOSIS — S14.129S CENTRAL CORD SYNDROME, SEQUELA (HCC): Chronic | ICD-10-CM

## 2021-09-15 DIAGNOSIS — F41.1 GENERALIZED ANXIETY DISORDER: Chronic | ICD-10-CM

## 2021-09-15 DIAGNOSIS — D68.0 VON WILLEBRAND'S DISEASE (HCC): Chronic | ICD-10-CM

## 2021-09-15 DIAGNOSIS — J30.1 SEASONAL ALLERGIC RHINITIS DUE TO POLLEN: Chronic | ICD-10-CM

## 2021-09-15 DIAGNOSIS — I10 ESSENTIAL HYPERTENSION: Primary | Chronic | ICD-10-CM

## 2021-09-15 DIAGNOSIS — Z23 NEEDS FLU SHOT: ICD-10-CM

## 2021-09-15 PROBLEM — D68.00 VON WILLEBRAND'S DISEASE: Chronic | Status: ACTIVE | Noted: 2021-09-15

## 2021-09-15 LAB — SL AMB POCT HEMOGLOBIN AIC: 5.2 (ref ?–6.5)

## 2021-09-15 PROCEDURE — 90662 IIV NO PRSV INCREASED AG IM: CPT | Performed by: FAMILY MEDICINE

## 2021-09-15 PROCEDURE — G0008 ADMIN INFLUENZA VIRUS VAC: HCPCS | Performed by: FAMILY MEDICINE

## 2021-09-15 PROCEDURE — 83036 HEMOGLOBIN GLYCOSYLATED A1C: CPT | Performed by: FAMILY MEDICINE

## 2021-09-15 PROCEDURE — 99214 OFFICE O/P EST MOD 30 MIN: CPT | Performed by: FAMILY MEDICINE

## 2021-09-15 NOTE — PATIENT INSTRUCTIONS
Overall seems to be doing well but is having some dental problems with question of whether not to tooth can be successfully pulled because of history of von Willebrand's disease  Platelet counts are mildly decreased but has not had any bleeding problems in the last number of years  If avoids aspirin and had I feel it would be okay to pull the tooth   Will continue all meds as is note A1c is excellent today at 5 2

## 2021-09-15 NOTE — ASSESSMENT & PLAN NOTE
Has no history of bleeding and I do feel it would be okay for the dental extraction    Should just avoid aspirin or NSAIDs

## 2021-09-15 NOTE — PROGRESS NOTES
Assessment/Plan:    Essential hypertension   Overall stable, continue current regimen    Central cord syndrome (HCC)   Appears stable, continue current regimen and follow-up    Generalized anxiety disorder   Doing well, continue current regimen    Seasonal allergic rhinitis due to pollen   Only mildly symptomatic may continue use of p r n  meds    Diabetic polyneuropathy associated with type 2 diabetes mellitus ([facility] )   Continues to show excellent control with diet  Watch sweets and starch in diet  Lab Results   Component Value Date    HGBA1C 5 2 09/15/2021       Von Willebrand's disease ([facility] )   Has no history of bleeding and I do feel it would be okay for the dental extraction  Should just avoid aspirin or NSAIDs       Diagnoses and all orders for this visit:    Essential hypertension    Type 2 diabetes mellitus without complication, without long-term current use of insulin (HCC)  -     POCT hemoglobin A1c    Needs flu shot  -     influenza vaccine, high-dose, PF 0 7 mL (FLUZONE HIGH-DOSE)    Central cord syndrome, sequela (HCC)    Generalized anxiety disorder    Seasonal allergic rhinitis due to pollen    Diabetic polyneuropathy associated with type 2 diabetes mellitus ([facility] )    Luigi Los Willebrand's disease ([facility] )    Other orders  -     Cancel: Hepatitis C Antibody ([facility]); Future          Subjective:      Patient ID: Lakia Hunt is a 72 y o  male  Patient has history of spinal cord injury with central cord syndrome and does self catheterization to pass urine  Also has history of hypertension diabetes mellitus adult, allergic rhinitis lower leg neuropathy, migraines, and stress disorder  Overall has been doing well but continues to have trouble with upper teeth on the left side in the back    Has prior diagnosis of von Willebrand's disease and dentist is hesitant to pull tooth because of this      The following portions of the patient's history were reviewed and updated as appropriate: allergies, current medications, past medical history, past social history and problem list     Review of Systems   Constitutional: Negative for activity change, appetite change, chills, fatigue, fever and unexpected weight change  HENT: Positive for dental problem  Negative for congestion, rhinorrhea and trouble swallowing  Eyes: Negative for visual disturbance  Respiratory: Negative for apnea, cough, chest tightness and shortness of breath  Cardiovascular: Negative for chest pain, palpitations and leg swelling  Gastrointestinal: Positive for constipation  Negative for abdominal distention, abdominal pain and diarrhea  Genitourinary: Positive for difficulty urinating ( does self catheterization)  Negative for enuresis  Musculoskeletal: Positive for arthralgias and back pain  Negative for myalgias  Skin: Negative for rash  Allergic/Immunologic: Negative for environmental allergies  Neurological: Negative for dizziness, weakness, light-headedness, numbness and headaches  Hematological: Negative for adenopathy  Does not bruise/bleed easily  Psychiatric/Behavioral: Negative for agitation, dysphoric mood and sleep disturbance  Objective:      /68 (BP Location: Left arm, Patient Position: Sitting, Cuff Size: Standard)   Ht 6' (1 829 m)   Wt 99 8 kg (220 lb)   BMI 29 84 kg/m²          Physical Exam  Vitals and nursing note reviewed  Constitutional:       Appearance: Normal appearance  He is well-developed  HENT:      Head: Normocephalic  Eyes:      Conjunctiva/sclera: Conjunctivae normal    Neck:      Thyroid: No thyromegaly  Vascular: No carotid bruit  Cardiovascular:      Rate and Rhythm: Normal rate and regular rhythm  Heart sounds: Normal heart sounds  No murmur ( rate is 66) heard  Pulmonary:      Effort: Pulmonary effort is normal       Breath sounds: Normal breath sounds  Abdominal:      General: Abdomen is flat  There is no distension        Palpations: Abdomen is soft  There is no mass  Tenderness: There is no abdominal tenderness  Musculoskeletal:         General: Normal range of motion  Cervical back: Normal range of motion and neck supple  Right lower leg: No edema  Left lower leg: No edema  Lymphadenopathy:      Cervical: No cervical adenopathy  Skin:     General: Skin is warm and dry  Findings: No rash  Neurological:      Mental Status: He is alert and oriented to person, place, and time  Motor: No weakness  Coordination: Coordination abnormal       Gait: Gait abnormal       Deep Tendon Reflexes: Reflexes abnormal ( reflexes 3+)  Psychiatric:         Mood and Affect: Mood normal          Behavior: Behavior normal          Thought Content:  Thought content normal          Judgment: Judgment normal

## 2021-09-15 NOTE — ASSESSMENT & PLAN NOTE
Continues to show excellent control with diet    Watch sweets and starch in diet  Lab Results   Component Value Date    HGBA1C 5 2 09/15/2021

## 2021-12-15 ENCOUNTER — OFFICE VISIT (OUTPATIENT)
Dept: FAMILY MEDICINE CLINIC | Facility: CLINIC | Age: 71
End: 2021-12-15
Payer: MEDICARE

## 2021-12-15 VITALS
SYSTOLIC BLOOD PRESSURE: 118 MMHG | HEIGHT: 72 IN | DIASTOLIC BLOOD PRESSURE: 68 MMHG | WEIGHT: 219 LBS | BODY MASS INDEX: 29.66 KG/M2

## 2021-12-15 DIAGNOSIS — F41.1 GENERALIZED ANXIETY DISORDER: Chronic | ICD-10-CM

## 2021-12-15 DIAGNOSIS — E11.42 DIABETIC POLYNEUROPATHY ASSOCIATED WITH TYPE 2 DIABETES MELLITUS (HCC): Primary | ICD-10-CM

## 2021-12-15 DIAGNOSIS — Z00.00 MEDICARE ANNUAL WELLNESS VISIT, SUBSEQUENT: ICD-10-CM

## 2021-12-15 DIAGNOSIS — I10 ESSENTIAL HYPERTENSION: Chronic | ICD-10-CM

## 2021-12-15 DIAGNOSIS — S14.129S CENTRAL CORD SYNDROME, SEQUELA (HCC): Chronic | ICD-10-CM

## 2021-12-15 PROCEDURE — G0439 PPPS, SUBSEQ VISIT: HCPCS | Performed by: FAMILY MEDICINE

## 2021-12-15 PROCEDURE — 99214 OFFICE O/P EST MOD 30 MIN: CPT | Performed by: FAMILY MEDICINE

## 2021-12-15 PROCEDURE — 1123F ACP DISCUSS/DSCN MKR DOCD: CPT | Performed by: FAMILY MEDICINE

## 2022-03-16 ENCOUNTER — OFFICE VISIT (OUTPATIENT)
Dept: FAMILY MEDICINE CLINIC | Facility: CLINIC | Age: 72
End: 2022-03-16
Payer: MEDICARE

## 2022-03-16 VITALS
HEIGHT: 72 IN | WEIGHT: 217 LBS | BODY MASS INDEX: 29.39 KG/M2 | SYSTOLIC BLOOD PRESSURE: 120 MMHG | DIASTOLIC BLOOD PRESSURE: 64 MMHG

## 2022-03-16 DIAGNOSIS — J30.1 SEASONAL ALLERGIC RHINITIS DUE TO POLLEN: Chronic | ICD-10-CM

## 2022-03-16 DIAGNOSIS — S14.129S CENTRAL CORD SYNDROME, SEQUELA (HCC): Chronic | ICD-10-CM

## 2022-03-16 DIAGNOSIS — E11.42 DIABETIC POLYNEUROPATHY ASSOCIATED WITH TYPE 2 DIABETES MELLITUS (HCC): ICD-10-CM

## 2022-03-16 DIAGNOSIS — I10 ESSENTIAL HYPERTENSION: Chronic | ICD-10-CM

## 2022-03-16 DIAGNOSIS — M54.2 MUSCLE PAIN, CERVICAL: Primary | ICD-10-CM

## 2022-03-16 DIAGNOSIS — G62.9 NEUROPATHY: Chronic | ICD-10-CM

## 2022-03-16 DIAGNOSIS — F41.1 GENERALIZED ANXIETY DISORDER: Chronic | ICD-10-CM

## 2022-03-16 PROBLEM — N17.9 AKI (ACUTE KIDNEY INJURY) (HCC): Status: RESOLVED | Noted: 2020-08-09 | Resolved: 2022-03-16

## 2022-03-16 LAB — SL AMB POCT HEMOGLOBIN AIC: 5.4 (ref ?–6.5)

## 2022-03-16 PROCEDURE — 83036 HEMOGLOBIN GLYCOSYLATED A1C: CPT | Performed by: FAMILY MEDICINE

## 2022-03-16 PROCEDURE — 99214 OFFICE O/P EST MOD 30 MIN: CPT | Performed by: FAMILY MEDICINE

## 2022-03-16 RX ORDER — CYCLOBENZAPRINE HCL 10 MG
10 TABLET ORAL 3 TIMES DAILY PRN
Qty: 20 TABLET | Refills: 1 | Status: SHIPPED | OUTPATIENT
Start: 2022-03-16

## 2022-03-16 NOTE — PROGRESS NOTES
Assessment/Plan:    Diabetic polyneuropathy associated with type 2 diabetes mellitus ([facility] )  Stable and his last A1c was normal   Continue to watch diet and push exercise  Lab Results   Component Value Date    HGBA1C 5 4 03/16/2022       Essential hypertension  Blood pressure stable today, continue current regimen and watch salt in diet  Generalized anxiety disorder  Has no complaints today, continue current regimen    Central cord syndrome (HCC)  Seems stable, continue the push exercise and continue current regimen    Neuropathy  Overall doing well, continue current regimen    Seasonal allergic rhinitis due to pollen  Doing well, continue over-the-counter medication       Diagnoses and all orders for this visit:    Muscle pain, cervical  -     cyclobenzaprine (FLEXERIL) 10 mg tablet; Take 1 tablet (10 mg total) by mouth 3 (three) times a day as needed for muscle spasms    Diabetic polyneuropathy associated with type 2 diabetes mellitus [facility]  -     POCT hemoglobin A1c    Essential hypertension    Generalized anxiety disorder    Central cord syndrome, sequela (HCC)    Neuropathy    Seasonal allergic rhinitis due to pollen          Subjective:      Patient ID: Amina Rao is a 80 y o  male  Patient has history of spinal cord injury with central cord syndrome and does self catheterization to pass urine  Also has history of hypertension diabetes mellitus adult, allergic rhinitis lower leg neuropathy, migraines, and stress disorder  Patient complains of right shoulder and right paraspinal neck pain following shoveling after the last snowstorm  He has been taking over-the-counter Tylenol for some relief  He has also tried massaging the area which also bring some relief  The pain is constant and does not radiate anywhere         The following portions of the patient's history were reviewed and updated as appropriate: allergies, current medications, past medical history, past social history and problem list BMI Counseling: Body mass index is 29 43 kg/m²  The BMI is above normal  Nutrition recommendations include moderation in carbohydrate intake  Exercise recommendations include moderate physical activity 150 minutes/week  No pharmacotherapy was ordered  Rationale for BMI follow-up plan is due to patient being overweight or obese       Review of Systems   Constitutional: Negative for activity change, chills, fatigue and fever  HENT: Positive for dental problem ( missing teeth)  Negative for congestion  Eyes: Negative for visual disturbance  Respiratory: Negative for cough, chest tightness, shortness of breath and wheezing  Cardiovascular: Negative for chest pain, palpitations and leg swelling  Gastrointestinal: Negative for abdominal pain, constipation and diarrhea  Genitourinary: Positive for difficulty urinating (Self catheterization due to central cord)  Negative for enuresis  Musculoskeletal: Positive for arthralgias ( right shoulder and right-sided neck pain) and back pain ( chronic)  Allergic/Immunologic: Positive for environmental allergies  Neurological: Negative for weakness  Psychiatric/Behavioral: Negative for sleep disturbance  Objective:      /64 (BP Location: Left arm, Patient Position: Sitting, Cuff Size: Standard)   Ht 6' (1 829 m)   Wt 98 4 kg (217 lb)   BMI 29 43 kg/m²          Physical Exam  Vitals and nursing note reviewed  Constitutional:       General: He is not in acute distress  Appearance: Normal appearance  He is not ill-appearing or diaphoretic  HENT:      Head: Normocephalic  Right Ear: External ear normal       Left Ear: External ear normal       Nose: Nose normal  No congestion or rhinorrhea  Mouth/Throat:      Mouth: Mucous membranes are moist       Pharynx: Oropharynx is clear  No oropharyngeal exudate or posterior oropharyngeal erythema  Eyes:      General:         Right eye: No discharge  Left eye: No discharge  Extraocular Movements: Extraocular movements intact  Conjunctiva/sclera: Conjunctivae normal       Pupils: Pupils are equal, round, and reactive to light  Neck:      Vascular: No carotid bruit  Cardiovascular:      Rate and Rhythm: Normal rate and regular rhythm  Pulses: Normal pulses  Heart sounds: Normal heart sounds  No murmur ( heart rate of 76) heard  No friction rub  No gallop  Pulmonary:      Effort: Pulmonary effort is normal  No respiratory distress  Breath sounds: Normal breath sounds  No stridor  No wheezing, rhonchi or rales  Chest:      Chest wall: No tenderness  Abdominal:      General: Abdomen is flat  Bowel sounds are normal  There is no distension  Palpations: There is no mass  Tenderness: There is no abdominal tenderness  There is no guarding or rebound  Hernia: No hernia is present  Musculoskeletal:         General: Tenderness ( right shoulder is tender to palpation  Right shoulder is weaker compared to the left) present  No swelling, deformity or signs of injury  Normal range of motion  Arms:       Cervical back: Normal range of motion and neck supple  Tenderness (Right-sided neck tenderness to paraspinal muscles) present  No rigidity  Right lower leg: No edema  Left lower leg: No edema  Lymphadenopathy:      Cervical: No cervical adenopathy  Skin:     General: Skin is warm and dry  Coloration: Skin is not jaundiced or pale  Findings: No bruising, erythema, lesion or rash  Neurological:      General: No focal deficit present  Mental Status: He is alert and oriented to person, place, and time  Mental status is at baseline  Cranial Nerves: No cranial nerve deficit  Sensory: No sensory deficit  Motor: No weakness        Coordination: Coordination normal       Gait: Gait normal       Deep Tendon Reflexes: Reflexes normal    Psychiatric:         Mood and Affect: Mood normal

## 2022-03-16 NOTE — PATIENT INSTRUCTIONS
Discussed the problem with the right shoulder and neck pain  I feel this is muscular  Should try to keep the neck in a neutral position and may continue with the heating pad  Is taking meloxicam and Tylenol  Gave Flexeril to use as a muscle relaxant which will probably make him tired but should help  Would continue all meds as is    If not help with the conservative treatment in another week or so should call and would consider physical therapy

## 2022-03-22 ENCOUNTER — TELEPHONE (OUTPATIENT)
Dept: FAMILY MEDICINE CLINIC | Facility: CLINIC | Age: 72
End: 2022-03-22

## 2022-03-22 NOTE — TELEPHONE ENCOUNTER
Prior auth request for Cycolbenzaprine 10mg tablets needed  [facility] [facility]  Case ID [id number]    Will await approval/denial

## 2022-05-26 ENCOUNTER — APPOINTMENT (OUTPATIENT)
Dept: LAB | Facility: HOSPITAL | Age: 72
End: 2022-05-26
Payer: MEDICARE

## 2022-05-26 DIAGNOSIS — Z12.5 PROSTATE CANCER SCREENING: ICD-10-CM

## 2022-05-26 LAB — PSA SERPL-MCNC: 0.7 NG/ML (ref 0–4)

## 2022-05-26 PROCEDURE — 36415 COLL VENOUS BLD VENIPUNCTURE: CPT

## 2022-05-26 PROCEDURE — G0103 PSA SCREENING: HCPCS

## 2022-06-21 ENCOUNTER — OFFICE VISIT (OUTPATIENT)
Dept: FAMILY MEDICINE CLINIC | Facility: CLINIC | Age: 72
End: 2022-06-21
Payer: MEDICARE

## 2022-06-21 VITALS
DIASTOLIC BLOOD PRESSURE: 70 MMHG | SYSTOLIC BLOOD PRESSURE: 120 MMHG | HEIGHT: 72 IN | WEIGHT: 217 LBS | BODY MASS INDEX: 29.39 KG/M2

## 2022-06-21 DIAGNOSIS — R73.09 ABNORMAL GLUCOSE: Chronic | ICD-10-CM

## 2022-06-21 DIAGNOSIS — R53.82 CHRONIC FATIGUE: ICD-10-CM

## 2022-06-21 DIAGNOSIS — G62.9 NEUROPATHY: Chronic | ICD-10-CM

## 2022-06-21 DIAGNOSIS — I10 ESSENTIAL HYPERTENSION: Primary | ICD-10-CM

## 2022-06-21 DIAGNOSIS — S14.129S CENTRAL CORD SYNDROME, SEQUELA (HCC): Chronic | ICD-10-CM

## 2022-06-21 DIAGNOSIS — F41.1 GENERALIZED ANXIETY DISORDER: Chronic | ICD-10-CM

## 2022-06-21 PROBLEM — E11.42 DIABETIC POLYNEUROPATHY ASSOCIATED WITH TYPE 2 DIABETES MELLITUS (HCC): Status: RESOLVED | Noted: 2019-08-02 | Resolved: 2022-06-21

## 2022-06-21 PROCEDURE — 99214 OFFICE O/P EST MOD 30 MIN: CPT | Performed by: FAMILY MEDICINE

## 2022-06-21 NOTE — ASSESSMENT & PLAN NOTE
Will check CBC and thyroid levels    If these are normal just try to watch starch in diet and push more physical activity

## 2022-06-21 NOTE — PATIENT INSTRUCTIONS
Discussed the problem with fatigue  Will check CBC and thyroid levels  If these are normal try to push a little bit more physical activity and watch the starch in the diet which may help  We will continue current meds as his    Will also check renal panel for history of hypertension

## 2022-06-21 NOTE — PROGRESS NOTES
Assessment/Plan:    Essential hypertension  Overall stable, continue current regimen  Will check renal panel    Abnormal glucose  A1cs have remained excellent  Watch starch in diet    Neuropathy  Appears stable, continue current regimen    Chronic fatigue  Will check CBC and thyroid levels  If these are normal just try to watch starch in diet and push more physical activity    Central cord syndrome (HCC)  Appears stable, continue current regimen and follow-up    Generalized anxiety disorder  Overall doing well, continue current regimen       Diagnoses and all orders for this visit:    Essential hypertension  -     Renal function panel; Future    Chronic fatigue  -     CBC and differential; Future  -     TSH + Free T4; Future    Neuropathy    Central cord syndrome, sequela (HCC)    Abnormal glucose    Generalized anxiety disorder          Subjective:      Patient ID: Iva Obando is a 80 y o  male  Patient has history of spinal cord injury with central cord syndrome and does self catheterization to pass urine  Also has history of hypertension diabetes mellitus adult, allergic rhinitis lower leg neuropathy, migraines, and stress disorder  Overall has been doing well but has been having trouble with fatigue      The following portions of the patient's history were reviewed and updated as appropriate: allergies, current medications, past medical history, past social history and problem list     Review of Systems   Constitutional: Positive for fatigue  Negative for activity change, appetite change, chills, fever and unexpected weight change  HENT: Negative for congestion, rhinorrhea and trouble swallowing  Eyes: Negative for visual disturbance  Respiratory: Negative for apnea, cough, chest tightness and shortness of breath  Cardiovascular: Negative for chest pain, palpitations and leg swelling  Gastrointestinal: Negative for abdominal distention, abdominal pain, constipation and diarrhea  Endocrine: Negative for polyuria  Genitourinary: Positive for difficulty urinating (Does self catheterization 4 to 6 times a day)  Musculoskeletal: Positive for arthralgias, back pain and neck pain  Negative for myalgias  Skin: Negative for rash  Allergic/Immunologic: Positive for environmental allergies  Neurological: Negative for dizziness, weakness, light-headedness, numbness and headaches  Hematological: Negative for adenopathy  Does not bruise/bleed easily  Psychiatric/Behavioral: Negative for agitation, dysphoric mood and sleep disturbance  Objective:      /70 (BP Location: Left arm, Patient Position: Sitting, Cuff Size: Standard)   Ht 6' (1 829 m)   Wt 98 4 kg (217 lb)   BMI 29 43 kg/m²          Physical Exam  Vitals and nursing note reviewed  Constitutional:       Appearance: Normal appearance  He is well-developed  HENT:      Head: Normocephalic  Nose: Nose normal       Mouth/Throat:      Mouth: Mucous membranes are moist    Eyes:      Conjunctiva/sclera: Conjunctivae normal    Neck:      Thyroid: No thyromegaly  Vascular: No carotid bruit  Cardiovascular:      Rate and Rhythm: Normal rate and regular rhythm  Heart sounds: Normal heart sounds  No murmur ( rate is 66) heard  Pulmonary:      Effort: Pulmonary effort is normal       Breath sounds: Normal breath sounds  Abdominal:      General: Abdomen is flat  There is no distension  Palpations: Abdomen is soft  There is no mass  Tenderness: There is no abdominal tenderness  Musculoskeletal:         General: Normal range of motion  Cervical back: Normal range of motion and neck supple  No tenderness  Right lower leg: No edema  Left lower leg: No edema  Lymphadenopathy:      Cervical: No cervical adenopathy  Skin:     General: Skin is warm and dry  Findings: No rash  Neurological:      Mental Status: He is alert and oriented to person, place, and time        Motor: No weakness  Coordination: Coordination abnormal       Gait: Gait abnormal       Deep Tendon Reflexes: Reflexes abnormal (Reflexes 4+)  Psychiatric:         Mood and Affect: Mood normal          Behavior: Behavior normal          Thought Content:  Thought content normal          Judgment: Judgment normal

## 2022-08-08 ENCOUNTER — APPOINTMENT (OUTPATIENT)
Dept: LAB | Facility: HOSPITAL | Age: 72
End: 2022-08-08
Attending: FAMILY MEDICINE
Payer: MEDICARE

## 2022-08-08 DIAGNOSIS — I10 ESSENTIAL HYPERTENSION: ICD-10-CM

## 2022-08-08 DIAGNOSIS — R53.82 CHRONIC FATIGUE: Primary | ICD-10-CM

## 2022-08-08 LAB
ALBUMIN SERPL BCP-MCNC: 4 G/DL (ref 3.5–5)
ANION GAP SERPL CALCULATED.3IONS-SCNC: 8 MMOL/L (ref 4–13)
BASOPHILS # BLD AUTO: 0.01 THOUSANDS/ΜL (ref 0–0.1)
BASOPHILS NFR BLD AUTO: 0 % (ref 0–1)
BUN SERPL-MCNC: 33 MG/DL (ref 5–25)
CALCIUM SERPL-MCNC: 8.7 MG/DL (ref 8.3–10.1)
CHLORIDE SERPL-SCNC: 103 MMOL/L (ref 96–108)
CO2 SERPL-SCNC: 25 MMOL/L (ref 21–32)
CREAT SERPL-MCNC: 1.38 MG/DL (ref 0.6–1.3)
EOSINOPHIL # BLD AUTO: 0.12 THOUSAND/ΜL (ref 0–0.61)
EOSINOPHIL NFR BLD AUTO: 2 % (ref 0–6)
ERYTHROCYTE [DISTWIDTH] IN BLOOD BY AUTOMATED COUNT: 13.2 % (ref 11.6–15.1)
GFR SERPL CREATININE-BSD FRML MDRD: 51 ML/MIN/1.73SQ M
GLUCOSE P FAST SERPL-MCNC: 141 MG/DL (ref 65–99)
HCT VFR BLD AUTO: 41 % (ref 36.5–49.3)
HGB BLD-MCNC: 14.5 G/DL (ref 12–17)
IMM GRANULOCYTES # BLD AUTO: 0.02 THOUSAND/UL (ref 0–0.2)
IMM GRANULOCYTES NFR BLD AUTO: 0 % (ref 0–2)
LYMPHOCYTES # BLD AUTO: 1.33 THOUSANDS/ΜL (ref 0.6–4.47)
LYMPHOCYTES NFR BLD AUTO: 24 % (ref 14–44)
MCH RBC QN AUTO: 30.7 PG (ref 26.8–34.3)
MCHC RBC AUTO-ENTMCNC: 35.4 G/DL (ref 31.4–37.4)
MCV RBC AUTO: 87 FL (ref 82–98)
MONOCYTES # BLD AUTO: 0.36 THOUSAND/ΜL (ref 0.17–1.22)
MONOCYTES NFR BLD AUTO: 6 % (ref 4–12)
NEUTROPHILS # BLD AUTO: 3.78 THOUSANDS/ΜL (ref 1.85–7.62)
NEUTS SEG NFR BLD AUTO: 68 % (ref 43–75)
NRBC BLD AUTO-RTO: 0 /100 WBCS
PHOSPHATE SERPL-MCNC: 3.1 MG/DL (ref 2.3–4.1)
PLATELET # BLD AUTO: 152 THOUSANDS/UL (ref 149–390)
PMV BLD AUTO: 9.2 FL (ref 8.9–12.7)
POTASSIUM SERPL-SCNC: 4.2 MMOL/L (ref 3.5–5.3)
RBC # BLD AUTO: 4.73 MILLION/UL (ref 3.88–5.62)
SODIUM SERPL-SCNC: 136 MMOL/L (ref 135–147)
T4 FREE SERPL-MCNC: 1.05 NG/DL (ref 0.76–1.46)
TSH SERPL DL<=0.05 MIU/L-ACNC: 1.29 UIU/ML (ref 0.45–4.5)
WBC # BLD AUTO: 5.62 THOUSAND/UL (ref 4.31–10.16)

## 2022-08-08 PROCEDURE — 85025 COMPLETE CBC W/AUTO DIFF WBC: CPT

## 2022-08-08 PROCEDURE — 84443 ASSAY THYROID STIM HORMONE: CPT

## 2022-08-08 PROCEDURE — 84439 ASSAY OF FREE THYROXINE: CPT

## 2022-08-08 PROCEDURE — 36415 COLL VENOUS BLD VENIPUNCTURE: CPT

## 2022-08-08 PROCEDURE — 80069 RENAL FUNCTION PANEL: CPT

## 2022-08-18 ENCOUNTER — OFFICE VISIT (OUTPATIENT)
Dept: FAMILY MEDICINE CLINIC | Facility: CLINIC | Age: 72
End: 2022-08-18
Payer: MEDICARE

## 2022-08-18 VITALS
WEIGHT: 215 LBS | BODY MASS INDEX: 29.12 KG/M2 | OXYGEN SATURATION: 98 % | SYSTOLIC BLOOD PRESSURE: 120 MMHG | HEART RATE: 89 BPM | DIASTOLIC BLOOD PRESSURE: 70 MMHG | HEIGHT: 72 IN

## 2022-08-18 DIAGNOSIS — I10 ESSENTIAL HYPERTENSION: Primary | Chronic | ICD-10-CM

## 2022-08-18 DIAGNOSIS — G62.9 NEUROPATHY: Chronic | ICD-10-CM

## 2022-08-18 DIAGNOSIS — S14.129S CENTRAL CORD SYNDROME, SEQUELA (HCC): Chronic | ICD-10-CM

## 2022-08-18 DIAGNOSIS — F41.1 GENERALIZED ANXIETY DISORDER: Chronic | ICD-10-CM

## 2022-08-18 DIAGNOSIS — R73.09 ABNORMAL GLUCOSE: Chronic | ICD-10-CM

## 2022-08-18 PROCEDURE — 99214 OFFICE O/P EST MOD 30 MIN: CPT | Performed by: FAMILY MEDICINE

## 2022-08-18 NOTE — PATIENT INSTRUCTIONS
Overall seems to be doing well  Try to push the walking activity and watch the starch in the diet  The fasting blood sugar was a little higher today although A1cs have been excellent which show the average blood sugar    Continue on current meds

## 2022-08-18 NOTE — PROGRESS NOTES
Assessment/Plan:    Essential hypertension  Overall stable, continue current regimen    Neuropathy  Seems to be doing well, continue current regimen    Central cord syndrome [facility])  Functioning well  Continue current regimen and follow-up  Push activity as tolerated    Abnormal glucose  Continues to show good control  Watch sweets and starch in diet    Generalized anxiety disorder  Doing well, continue current regimen       Diagnoses and all orders for this visit:    Essential hypertension    Neuropathy    Central cord syndrome, sequela (HCC)    Abnormal glucose    Generalized anxiety disorder          Subjective:      Patient ID: Akbar Hoang is a 80 y o  male  Patient has history of spinal cord injury with central cord syndrome and does self catheterization to pass urine  Also has history of hypertension diabetes mellitus adult, allergic rhinitis lower leg neuropathy, migraines, and stress disorder  Overall has been doing well      The following portions of the patient's history were reviewed and updated as appropriate: allergies, current medications, past medical history, past social history and problem list     Review of Systems   Constitutional: Negative for activity change, appetite change, chills, fatigue, fever and unexpected weight change  HENT: Negative for congestion, rhinorrhea and trouble swallowing  Eyes: Negative for visual disturbance  Respiratory: Negative for apnea, cough, chest tightness and shortness of breath  Cardiovascular: Negative for chest pain, palpitations and leg swelling  Gastrointestinal: Negative for abdominal distention, abdominal pain, constipation and diarrhea  Endocrine: Negative for polyuria  Genitourinary: Positive for difficulty urinating (Does self catheterization)  Negative for enuresis  Musculoskeletal: Positive for arthralgias and gait problem  Negative for myalgias  Skin: Negative for rash     Allergic/Immunologic: Negative for environmental allergies  Neurological: Negative for dizziness, weakness, light-headedness, numbness and headaches  Hematological: Negative for adenopathy  Psychiatric/Behavioral: Negative for agitation, dysphoric mood and sleep disturbance  Objective:      /70 (BP Location: Left arm, Patient Position: Sitting, Cuff Size: Standard)   Pulse 89   Ht 6' (1 829 m)   Wt 97 5 kg (215 lb)   SpO2 98%   BMI 29 16 kg/m²          Physical Exam  Vitals and nursing note reviewed  Constitutional:       Appearance: Normal appearance  He is well-developed  HENT:      Head: Normocephalic  Nose: Nose normal    Eyes:      Conjunctiva/sclera: Conjunctivae normal    Neck:      Thyroid: No thyromegaly  Vascular: No carotid bruit  Cardiovascular:      Rate and Rhythm: Normal rate and regular rhythm  Heart sounds: Normal heart sounds  No murmur ( rate is 72) heard  Pulmonary:      Effort: Pulmonary effort is normal       Breath sounds: Normal breath sounds  Abdominal:      General: There is no distension  Palpations: There is no mass  Tenderness: There is no abdominal tenderness  Musculoskeletal:         General: Normal range of motion  Cervical back: Normal range of motion and neck supple  No tenderness  Right lower leg: No edema  Left lower leg: No edema  Lymphadenopathy:      Cervical: No cervical adenopathy  Skin:     General: Skin is warm and dry  Findings: No rash  Neurological:      Mental Status: He is alert and oriented to person, place, and time  Motor: No weakness  Coordination: Coordination abnormal       Gait: Gait abnormal       Deep Tendon Reflexes: Reflexes abnormal (Reflexes 4+)  Psychiatric:         Mood and Affect: Mood normal          Behavior: Behavior normal          Thought Content:  Thought content normal          Judgment: Judgment normal

## 2022-09-26 ENCOUNTER — OFFICE VISIT (OUTPATIENT)
Dept: FAMILY MEDICINE CLINIC | Facility: CLINIC | Age: 72
End: 2022-09-26
Payer: MEDICARE

## 2022-09-26 VITALS
DIASTOLIC BLOOD PRESSURE: 72 MMHG | BODY MASS INDEX: 29.26 KG/M2 | HEIGHT: 72 IN | SYSTOLIC BLOOD PRESSURE: 122 MMHG | WEIGHT: 216 LBS

## 2022-09-26 DIAGNOSIS — B35.6 TINEA CRURIS: ICD-10-CM

## 2022-09-26 DIAGNOSIS — R42 DIZZINESS: Primary | ICD-10-CM

## 2022-09-26 DIAGNOSIS — Z23 NEEDS FLU SHOT: ICD-10-CM

## 2022-09-26 DIAGNOSIS — G82.50 QUADRIPLEGIA (HCC): ICD-10-CM

## 2022-09-26 DIAGNOSIS — D68.00 VON WILLEBRAND'S DISEASE: ICD-10-CM

## 2022-09-26 PROCEDURE — 90662 IIV NO PRSV INCREASED AG IM: CPT | Performed by: FAMILY MEDICINE

## 2022-09-26 PROCEDURE — G0008 ADMIN INFLUENZA VIRUS VAC: HCPCS | Performed by: FAMILY MEDICINE

## 2022-09-26 PROCEDURE — 99214 OFFICE O/P EST MOD 30 MIN: CPT | Performed by: FAMILY MEDICINE

## 2022-09-26 RX ORDER — KETOCONAZOLE 20 MG/G
CREAM TOPICAL DAILY
Qty: 15 G | Refills: 1 | Status: SHIPPED | OUTPATIENT
Start: 2022-09-26

## 2022-09-26 NOTE — PATIENT INSTRUCTIONS
Discussed the problem with the dizziness which I feel relates to the middle ear and probably to fluid behind the left ear drum  Should use saline nasal rinse at bedtime and in the morning and afterwards use 1 spray in each nostril of Nasacort  May get meclizine over-the-counter 12 5 mg and take in the evening  May also want to take in the daytime if going out and doing certain things may aggravate the dizziness which will tend to come from up and down head motion  Realize the meclizine is likely to make it tired  Also is having intermittent problems with irritation in the groin area which looks like tinea cruris and gave prescription for Nizoral cream to use at night times 10 days as needed    Will give flu shot today

## 2022-09-26 NOTE — PROGRESS NOTES
Name: Davidson Beavers      : 1950      MRN: [id number]  Encounter Provider: Tonia Ching MD  Encounter Date: 2022   Encounter department: [address] [place] [address] PRIMARY CARE    Assessment & Plan     1  Dizziness  Assessment & Plan:  I feel this relates to the middle ear in does appear to be having some middle ear effusion  Should use saline nasal rinse at bedtime and in the morning and afterwards use 1 spray in each nostril of Nasacort  May use meclizine at bedtime 12 5 mg and p r n  During the day  This should gradually improve over the next few weeks      2  Needs flu shot  -     influenza vaccine, high-dose, PF 0 7 mL (FLUZONE HIGH-DOSE)    3  Tinea cruris  Assessment & Plan:  Discussed problem  May use Nizoral cream nightly times 10 days p r n  Orders:  -     ketoconazole (NIZORAL) 2 % cream; Apply topically daily    4  Quadriplegia ([address] )    5  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[facility])         Subjective      Patient has history of spinal cord injury with central cord syndrome and does self catheterization to pass urine  Also has history of hypertension diabetes mellitus adult, allergic rhinitis lower leg neuropathy, migraines, and stress disorder  Seen today for problems with dizziness over the last several days  Has notice crackling in the ear on the left and some decreased hearing  No double vision or trouble with the heart racing  Also has been having intermittent problems with reddish rash in the groin that is sometimes itchy and sometimes burning in nature    Review of Systems   Constitutional: Negative for fatigue  HENT: Positive for hearing loss  Negative for congestion and ear pain ( does notice crackling in the left ear)  Eyes: Positive for visual disturbance (When having the dizziness)  Respiratory: Negative for cough  Cardiovascular: Negative for chest pain and palpitations  Allergic/Immunologic: Positive for environmental allergies     Neurological: Positive for dizziness and light-headedness  Hematological: Negative for adenopathy  Psychiatric/Behavioral: Negative for sleep disturbance  Current Outpatient Medications on File Prior to Visit   Medication Sig    acetaminophen (TYLENOL) 500 mg tablet Take 1,000 mg by mouth 2 (two) times a day    citalopram (CeleXA) 20 mg tablet TAKE 1 TABLET BY MOUTH  DAILY    cyclobenzaprine (FLEXERIL) 10 mg tablet Take 1 tablet (10 mg total) by mouth 3 (three) times a day as needed for muscle spasms    glucosamine-chondroitin 500-400 MG tablet Take 1 tablet by mouth daily    lisinopril (ZESTRIL) 20 mg tablet TAKE 1 TABLET BY MOUTH  DAILY    LORazepam (ATIVAN) 0 5 mg tablet TAKE 1 TABLET BY MOUTH  DAILY AS NEEDED FOR ANXIETY    Multiple Vitamins-Minerals (MULTIVITAMIN ADULTS PO) Take 1 tablet by mouth daily    oxybutynin (DITROPAN) 5 mg tablet TAKE 1 TABLET BY MOUTH  TWICE DAILY    gabapentin (Neurontin) 300 mg capsule Take 1 capsule (300 mg total) by mouth 3 (three) times a day    meloxicam (MOBIC) 15 mg tablet Take 1 tablet (15 mg total) by mouth daily       Objective     /72 (BP Location: Left arm, Patient Position: Sitting, Cuff Size: Standard)   Ht 6' (1 829 m)   Wt 98 kg (216 lb)   BMI 29 29 kg/m²     Physical Exam  Vitals and nursing note reviewed  Constitutional:       Appearance: Normal appearance  He is well-developed  HENT:      Head: Normocephalic  Right Ear: Ear canal and external ear normal  A middle ear effusion is present  Eyes:      Extraocular Movements: Extraocular movements intact  Conjunctiva/sclera: Conjunctivae normal       Pupils: Pupils are equal, round, and reactive to light  Neck:      Thyroid: No thyromegaly  Vascular: No carotid bruit  Cardiovascular:      Rate and Rhythm: Normal rate and regular rhythm  Heart sounds: Normal heart sounds  No murmur ( rate is 72) heard    Pulmonary:      Effort: Pulmonary effort is normal       Breath sounds: Normal breath sounds  Abdominal:      General: There is no distension  Palpations: There is no mass  Tenderness: There is no abdominal tenderness  Genitourinary:      Musculoskeletal:         General: Normal range of motion  Cervical back: Normal range of motion and neck supple  No tenderness  Right lower leg: No edema  Left lower leg: No edema  Lymphadenopathy:      Cervical: No cervical adenopathy  Skin:     General: Skin is warm and dry  Findings: No rash  Neurological:      Mental Status: He is alert and oriented to person, place, and time  Motor: No weakness  Coordination: Coordination abnormal       Gait: Gait abnormal       Deep Tendon Reflexes: Reflexes abnormal (Reflexes 4+)     Psychiatric:         Mood and Affect: Mood normal        Katya Abdi MD

## 2022-10-04 ENCOUNTER — APPOINTMENT (EMERGENCY)
Dept: CT IMAGING | Facility: HOSPITAL | Age: 72
End: 2022-10-04
Payer: MEDICARE

## 2022-10-04 ENCOUNTER — HOSPITAL ENCOUNTER (OUTPATIENT)
Facility: HOSPITAL | Age: 72
Setting detail: OBSERVATION
Discharge: HOME | End: 2022-10-05
Attending: EMERGENCY MEDICINE | Admitting: FAMILY MEDICINE
Payer: MEDICARE

## 2022-10-04 DIAGNOSIS — R42 DIZZINESS: Primary | ICD-10-CM

## 2022-10-04 DIAGNOSIS — R27.0 ATAXIA: ICD-10-CM

## 2022-10-04 DIAGNOSIS — N30.00 ACUTE CYSTITIS WITHOUT HEMATURIA: ICD-10-CM

## 2022-10-04 DIAGNOSIS — R42 VERTIGO: ICD-10-CM

## 2022-10-04 LAB
ALBUMIN SERPL BCP-MCNC: 3.9 G/DL (ref 3.5–5)
ALP SERPL-CCNC: 67 U/L (ref 46–116)
ALT SERPL W P-5'-P-CCNC: 28 U/L (ref 12–78)
ANION GAP SERPL CALCULATED.3IONS-SCNC: 5 MMOL/L (ref 4–13)
AST SERPL W P-5'-P-CCNC: 9 U/L (ref 5–45)
BACTERIA UR QL AUTO: ABNORMAL /HPF
BASOPHILS # BLD AUTO: 0.03 THOUSANDS/ΜL (ref 0–0.1)
BASOPHILS NFR BLD AUTO: 1 % (ref 0–1)
BILIRUB SERPL-MCNC: 0.99 MG/DL (ref 0.2–1)
BILIRUB UR QL STRIP: NEGATIVE
BUN SERPL-MCNC: 42 MG/DL (ref 5–25)
CALCIUM SERPL-MCNC: 8.9 MG/DL (ref 8.3–10.1)
CARDIAC TROPONIN I PNL SERPL HS: 3 NG/L
CHLORIDE SERPL-SCNC: 104 MMOL/L (ref 96–108)
CLARITY UR: ABNORMAL
CO2 SERPL-SCNC: 33 MMOL/L (ref 21–32)
COLOR UR: YELLOW
CREAT SERPL-MCNC: 1.14 MG/DL (ref 0.6–1.3)
EOSINOPHIL # BLD AUTO: 0.17 THOUSAND/ΜL (ref 0–0.61)
EOSINOPHIL NFR BLD AUTO: 3 % (ref 0–6)
ERYTHROCYTE [DISTWIDTH] IN BLOOD BY AUTOMATED COUNT: 13 % (ref 11.6–15.1)
GFR SERPL CREATININE-BSD FRML MDRD: 64 ML/MIN/1.73SQ M
GLUCOSE SERPL-MCNC: 101 MG/DL (ref 65–140)
GLUCOSE UR STRIP-MCNC: NEGATIVE MG/DL
HCT VFR BLD AUTO: 40 % (ref 36.5–49.3)
HGB BLD-MCNC: 14.2 G/DL (ref 12–17)
HGB UR QL STRIP.AUTO: NEGATIVE
IMM GRANULOCYTES # BLD AUTO: 0.04 THOUSAND/UL (ref 0–0.2)
IMM GRANULOCYTES NFR BLD AUTO: 1 % (ref 0–2)
KETONES UR STRIP-MCNC: NEGATIVE MG/DL
LEUKOCYTE ESTERASE UR QL STRIP: ABNORMAL
LYMPHOCYTES # BLD AUTO: 1.82 THOUSANDS/ΜL (ref 0.6–4.47)
LYMPHOCYTES NFR BLD AUTO: 28 % (ref 14–44)
MCH RBC QN AUTO: 31.1 PG (ref 26.8–34.3)
MCHC RBC AUTO-ENTMCNC: 35.5 G/DL (ref 31.4–37.4)
MCV RBC AUTO: 88 FL (ref 82–98)
MONOCYTES # BLD AUTO: 0.38 THOUSAND/ΜL (ref 0.17–1.22)
MONOCYTES NFR BLD AUTO: 6 % (ref 4–12)
NEUTROPHILS # BLD AUTO: 4.06 THOUSANDS/ΜL (ref 1.85–7.62)
NEUTS SEG NFR BLD AUTO: 63 % (ref 43–75)
NITRITE UR QL STRIP: POSITIVE
NON-SQ EPI CELLS URNS QL MICRO: ABNORMAL /HPF
PH UR STRIP.AUTO: 6 [PH]
PLATELET # BLD AUTO: 147 THOUSANDS/UL (ref 149–390)
PMV BLD AUTO: 9 FL (ref 8.9–12.7)
POTASSIUM SERPL-SCNC: 4.1 MMOL/L (ref 3.5–5.3)
PROT SERPL-MCNC: 7.4 G/DL (ref 6.4–8.4)
PROT UR STRIP-MCNC: NEGATIVE MG/DL
RBC # BLD AUTO: 4.57 MILLION/UL (ref 3.88–5.62)
RBC #/AREA URNS AUTO: ABNORMAL /HPF
SODIUM SERPL-SCNC: 142 MMOL/L (ref 135–147)
SP GR UR STRIP.AUTO: 1.02 (ref 1–1.03)
UROBILINOGEN UR QL STRIP.AUTO: 1 E.U./DL
WBC # BLD AUTO: 6.5 THOUSAND/UL (ref 4.31–10.16)
WBC #/AREA URNS AUTO: ABNORMAL /HPF

## 2022-10-04 PROCEDURE — 85025 COMPLETE CBC W/AUTO DIFF WBC: CPT | Performed by: EMERGENCY MEDICINE

## 2022-10-04 PROCEDURE — 84484 ASSAY OF TROPONIN QUANT: CPT | Performed by: EMERGENCY MEDICINE

## 2022-10-04 PROCEDURE — 99285 EMERGENCY DEPT VISIT HI MDM: CPT

## 2022-10-04 PROCEDURE — 93005 ELECTROCARDIOGRAM TRACING: CPT

## 2022-10-04 PROCEDURE — 87086 URINE CULTURE/COLONY COUNT: CPT

## 2022-10-04 PROCEDURE — 80053 COMPREHEN METABOLIC PANEL: CPT | Performed by: EMERGENCY MEDICINE

## 2022-10-04 PROCEDURE — 99284 EMERGENCY DEPT VISIT MOD MDM: CPT | Performed by: EMERGENCY MEDICINE

## 2022-10-04 PROCEDURE — 96365 THER/PROPH/DIAG IV INF INIT: CPT

## 2022-10-04 PROCEDURE — 36415 COLL VENOUS BLD VENIPUNCTURE: CPT | Performed by: EMERGENCY MEDICINE

## 2022-10-04 PROCEDURE — G1004 CDSM NDSC: HCPCS

## 2022-10-04 PROCEDURE — 84145 PROCALCITONIN (PCT): CPT

## 2022-10-04 PROCEDURE — 87077 CULTURE AEROBIC IDENTIFY: CPT

## 2022-10-04 PROCEDURE — 81001 URINALYSIS AUTO W/SCOPE: CPT | Performed by: EMERGENCY MEDICINE

## 2022-10-04 PROCEDURE — 87186 SC STD MICRODIL/AGAR DIL: CPT

## 2022-10-04 PROCEDURE — 70450 CT HEAD/BRAIN W/O DYE: CPT

## 2022-10-04 RX ORDER — MECLIZINE HYDROCHLORIDE 25 MG/1
25 TABLET ORAL ONCE
Status: COMPLETED | OUTPATIENT
Start: 2022-10-04 | End: 2022-10-04

## 2022-10-04 RX ADMIN — MECLIZINE HYDROCHLORIDE 25 MG: 25 TABLET ORAL at 19:55

## 2022-10-04 RX ADMIN — SODIUM CHLORIDE, SODIUM LACTATE, POTASSIUM CHLORIDE, AND CALCIUM CHLORIDE 1000 ML: .6; .31; .03; .02 INJECTION, SOLUTION INTRAVENOUS at 19:52

## 2022-10-04 NOTE — ED PROVIDER NOTES
History  Chief Complaint   Patient presents with    Dizziness     Pt reports he has been feeling dizzy, feels like the room is spinning, for over one week, seen by PCP last week, started on nasal spray  States symptoms have progressed to point he is unable to ambulate  History provided by:  Medical records, patient and spouse  Dizziness  Quality:  Imbalance, room spinning and vertigo  Severity:  Moderate  Onset quality:  Gradual  Duration:  2 weeks  Timing:  Constant  Progression:  Waxing and waning  Chronicity:  New  Context comment:  Patient with 2 weeks of room spinning, imbalance with ambulation, had some nausea and dry heaves 2 days ago  History of C3 fracture from a fall, has chronic contracture of left hand and foot drop on the left  Denies history of CVA  Relieved by:  Being still  Worsened by: Movement and standing up  Ineffective treatments:  None tried  Associated symptoms: no chest pain, no headaches, no nausea, no palpitations, no shortness of breath, no vomiting and no weakness    Risk factors: no hx of stroke        Prior to Admission Medications   Prescriptions Last Dose Informant Patient Reported? Taking?    LORazepam (ATIVAN) 0 5 mg tablet   No No   Sig: TAKE 1 TABLET BY MOUTH  DAILY AS NEEDED FOR ANXIETY   Multiple Vitamins-Minerals (MULTIVITAMIN ADULTS PO)   Yes No   Sig: Take 1 tablet by mouth daily   acetaminophen (TYLENOL) 500 mg tablet   Yes No   Sig: Take 1,000 mg by mouth 2 (two) times a day   citalopram (CeleXA) 20 mg tablet   No No   Sig: TAKE 1 TABLET BY MOUTH  DAILY   cyclobenzaprine (FLEXERIL) 10 mg tablet   No No   Sig: Take 1 tablet (10 mg total) by mouth 3 (three) times a day as needed for muscle spasms   gabapentin (Neurontin) 300 mg capsule   No No   Sig: Take 1 capsule (300 mg total) by mouth 3 (three) times a day   glucosamine-chondroitin 500-400 MG tablet   Yes No   Sig: Take 1 tablet by mouth daily   ketoconazole (NIZORAL) 2 % cream   No No   Sig: Apply topically daily   lisinopril (ZESTRIL) 20 mg tablet   No No   Sig: TAKE 1 TABLET BY MOUTH  DAILY   meloxicam (MOBIC) 15 mg tablet   No No   Sig: Take 1 tablet (15 mg total) by mouth daily   oxybutynin (DITROPAN) 5 mg tablet   No No   Sig: TAKE 1 TABLET BY MOUTH  TWICE DAILY      Facility-Administered Medications: None       Past Medical History:   Diagnosis Date    Allergic NA    Allergic rhinitis     Anxiety 7/1998    Arthritis     [facility] )     Clotting disorder ([facility] ) 7/1998    Depression     Essential hypertension     Hypertension 7/2018    Self-catheterizes urinary bladder     Shingles 7/2017    Urinary tract infection 7/2000    Von Willebrand disease     Pt requires DDAVP prior to "major" surgery        Past Surgical History:   Procedure Laterality Date    CHOLECYSTECTOMY      FEEDING TUBE PLACEMENT      GASTROSTOMY  7/2000       Family History   Problem Relation Age of Onset    Diabetes Mother     Hypertension Mother     Hyperlipidemia Mother     Diabetes Father     Hypertension Father     Hyperlipidemia Father     Hyperlipidemia Maternal Grandmother      I have reviewed and agree with the history as documented  E-Cigarette/Vaping    E-Cigarette Use Never User      E-Cigarette/Vaping Substances     Social History     Tobacco Use    Smoking status: Former Smoker     Packs/day: 1 00     Years: 20 00     Pack years: 20 00     Types: Cigarettes    Smokeless tobacco: Never Used   Vaping Use    Vaping Use: Never used   Substance Use Topics    Alcohol use: Not Currently     Comment: Rarely     Drug use: Never       Review of Systems   Constitutional: Negative for appetite change, chills, fatigue and fever  HENT: Negative for ear pain, rhinorrhea, sore throat and trouble swallowing  Eyes: Negative for pain, discharge and visual disturbance  Respiratory: Negative for cough, chest tightness and shortness of breath      Cardiovascular: Negative for chest pain and palpitations  Gastrointestinal: Negative for abdominal pain, nausea and vomiting  Endocrine: Negative for polydipsia, polyphagia and polyuria  Genitourinary: Negative for difficulty urinating, dysuria, hematuria and testicular pain  Musculoskeletal: Negative for arthralgias and back pain  Skin: Negative for color change and rash  Allergic/Immunologic: Negative for immunocompromised state  Neurological: Positive for dizziness and light-headedness  Negative for seizures, syncope, weakness and headaches  Hematological: Negative for adenopathy  Psychiatric/Behavioral: Negative for confusion and dysphoric mood  All other systems reviewed and are negative  Physical Exam  Physical Exam  Constitutional:       General: He is not in acute distress  Appearance: Normal appearance  He is not ill-appearing, toxic-appearing or diaphoretic  HENT:      Head: Normocephalic and atraumatic  Nose: Nose normal  No congestion or rhinorrhea  Mouth/Throat:      Mouth: Mucous membranes are moist       Pharynx: Oropharynx is clear  No oropharyngeal exudate or posterior oropharyngeal erythema  Eyes:      General:         Right eye: No discharge  Left eye: No discharge  Extraocular Movements: Extraocular movements intact  Conjunctiva/sclera: Conjunctivae normal       Pupils: Pupils are equal, round, and reactive to light  Comments: Horizontal nystagmus, fast beating to the left when looking to the right   Cardiovascular:      Rate and Rhythm: Normal rate and regular rhythm  Pulses: Normal pulses  Heart sounds: Normal heart sounds  No murmur heard  No gallop  Pulmonary:      Effort: Pulmonary effort is normal  No respiratory distress  Breath sounds: Normal breath sounds  No stridor  No wheezing, rhonchi or rales  Chest:      Chest wall: No tenderness  Abdominal:      General: Bowel sounds are normal  There is no distension  Palpations: Abdomen is soft  There is no mass  Tenderness: There is no abdominal tenderness  There is no right CVA tenderness, left CVA tenderness, guarding or rebound  Hernia: No hernia is present  Musculoskeletal:         General: Normal range of motion  Cervical back: Normal range of motion and neck supple  Skin:     General: Skin is warm and dry  Capillary Refill: Capillary refill takes less than 2 seconds  Neurological:      Mental Status: He is alert and oriented to person, place, and time  Cranial Nerves: No cranial nerve deficit  Sensory: No sensory deficit  Motor: Weakness present  Coordination: Coordination normal       Gait: Gait abnormal       Deep Tendon Reflexes: Reflexes normal       Comments: Normal finger to nose right hand, left hand is contracted not able to be tested, and heel to shin bilaterally  Chronic contracture of the left hand  Psychiatric:         Mood and Affect: Mood normal          Behavior: Behavior normal          Thought Content:  Thought content normal          Judgment: Judgment normal          Vital Signs  ED Triage Vitals   Temperature Pulse Respirations Blood Pressure SpO2   10/04/22 1917 10/04/22 1917 10/04/22 1917 10/04/22 1917 10/04/22 1917   98 °F (36 7 °C) 58 16 121/89 98 %      Temp src Heart Rate Source Patient Position - Orthostatic VS BP Location FiO2 (%)   -- 10/04/22 1925 10/04/22 1917 10/04/22 1917 --    Monitor Lying Right arm       Pain Score       10/04/22 1917       No Pain           Vitals:    10/04/22 1930 10/04/22 2000 10/04/22 2100 10/04/22 2130   BP: 155/88 142/82 148/79 151/78   Pulse: 56 (!) 53 (!) 51 (!) 51   Patient Position - Orthostatic VS: Lying Lying Lying Lying         Visual Acuity  Visual Acuity    Flowsheet Row Most Recent Value   L Pupil Size (mm) 5   R Pupil Size (mm) 5          ED Medications  Medications   lactated ringers bolus 1,000 mL (0 mL Intravenous Stopped 10/4/22 2052)   meclizine (ANTIVERT) tablet 25 mg (25 mg Oral Given 10/4/22 1955)       Diagnostic Studies  Results Reviewed     Procedure Component Value Units Date/Time    HS Troponin 0hr (reflex protocol) [[id number]]  (Normal) Collected: 10/04/22 1950    Lab Status: Final result Specimen: Blood from Arm, Right Updated: 10/04/22 2056     hs TnI 0hr 3 ng/L     CBC and differential [[id number]]  (Abnormal) Collected: 10/04/22 1950    Lab Status: Final result Specimen: Blood from Arm, Right Updated: 10/04/22 2042     WBC 6 50 Thousand/uL      RBC 4 57 Million/uL      Hemoglobin 14 2 g/dL      Hematocrit 40 0 %      MCV 88 fL      MCH 31 1 pg      MCHC 35 5 g/dL      RDW 13 0 %      MPV 9 0 fL      Platelets 417 Thousands/uL      Neutrophils Relative 63 %      Immat GRANS % 1 %      Lymphocytes Relative 28 %      Monocytes Relative 6 %      Eosinophils Relative 3 %      Basophils Relative 1 %      Neutrophils Absolute 4 06 Thousands/µL      Immature Grans Absolute 0 04 Thousand/uL      Lymphocytes Absolute 1 82 Thousands/µL      Monocytes Absolute 0 38 Thousand/µL      Eosinophils Absolute 0 17 Thousand/µL      Basophils Absolute 0 03 Thousands/µL     Comprehensive metabolic panel [[id number]]  (Abnormal) Collected: 10/04/22 1950    Lab Status: Final result Specimen: Blood from Arm, Right Updated: 10/04/22 2038     Sodium 142 mmol/L      Potassium 4 1 mmol/L      Chloride 104 mmol/L      CO2 33 mmol/L      ANION GAP 5 mmol/L      BUN 42 mg/dL      Creatinine 1 14 mg/dL      Glucose 101 mg/dL      Calcium 8 9 mg/dL      AST 9 U/L      ALT 28 U/L      Alkaline Phosphatase 67 U/L      Total Protein 7 4 g/dL      Albumin 3 9 g/dL      Total Bilirubin 0 99 mg/dL      eGFR 64 ml/min/1 73sq m     Narrative:      Pelon guidelines for Chronic Kidney Disease (CKD):     Stage 1 with normal or high GFR (GFR > 90 mL/min/1 73 square meters)    Stage 2 Mild CKD (GFR = 60-89 mL/min/1 73 square meters)    Stage 3A Moderate CKD (GFR = 45-59 mL/min/1 73 square meters)    Stage 3B Moderate CKD (GFR = 30-44 mL/min/1 73 square meters)    Stage 4 Severe CKD (GFR = 15-29 mL/min/1 73 square meters)    Stage 5 End Stage CKD (GFR <15 mL/min/1 73 square meters)  Note: GFR calculation is accurate only with a steady state creatinine    UA (URINE) with reflex to Scope [[id number]]     Lab Status: No result Specimen: Urine                  CT head without contrast   Final Result by Reema Petty MD (10/04 2107)      No acute intracranial abnormality  Workstation performed: [id number]                    Procedures  Procedures         ED Course  ED Course as of 10/04/22 2241   Tue Oct 04, 2022   1901 1901:  Patient appears well, vital signs reviewed  Patient noted to have nystagmus on exam concerns for vertigo, given age and risk factors higher concerns for central etiology  Plan to complete basic labs, CT head  EKG shows normal sinus rhythm without acute ischemia or interval derangement  I will rehydrate with IV fluids, trial meclizine, re-evaluate  2115 2115:  CT and labs reviewed  Patient states he feels much improved, will complete ambulatory trial and make disposition  2200 2200:  Patient severely ataxic still, unsteady with ambulation  I will consult medicine for observational admission  SBIRT 22yo+    Flowsheet Row Most Recent Value   SBIRT (25 yo +)    In order to provide better care to our patients, we are screening all of our patients for alcohol and drug use  Would it be okay to ask you these screening questions? Yes Filed at: 10/04/2022 1927   Initial Alcohol Screen: US AUDIT-C     1  How often do you have a drink containing alcohol? 0 Filed at: 10/04/2022 1927   2  How many drinks containing alcohol do you have on a typical day you are drinking? 0 Filed at: 10/04/2022 1927   3b  FEMALE Any Age, or MALE 65+: How often do you have 4 or more drinks on one occassion?  0 Filed at: 10/04/2022 1927   Audit-C Score 0 Filed at: 10/04/2022 1927   DARSHAN: How many times in the past year have you    Used an illegal drug or used a prescription medication for non-medical reasons? Never Filed at: 10/04/2022 1927                    MDM    Disposition  Final diagnoses:   Dizziness   Vertigo   Ataxia     Time reflects when diagnosis was documented in both MDM as applicable and the Disposition within this note     Time User Action Codes Description Comment    10/4/2022  9:57 PM Manuel Zamora [R42] Dizziness     10/4/2022  9:57 PM Jocelin Allen [R42] Vertigo     10/4/2022  9:57 PM Manuel Zamora [R27 0] Ataxia       ED Disposition     ED Disposition   Admit    Condition   Stable    Date/Time   Tue Oct 4, 2022  9:57 PM    Comment              Follow-up Information    None         Patient's Medications   Discharge Prescriptions    No medications on file       No discharge procedures on file      PDMP Review       Value Time User    PDMP Reviewed  Yes 4/25/2022  1:51 PM Jacquie Griffin MD          ED Provider  Electronically Signed by           Vishnu Maradiaga MD  10/04/22 4020

## 2022-10-05 VITALS
OXYGEN SATURATION: 94 % | SYSTOLIC BLOOD PRESSURE: 147 MMHG | DIASTOLIC BLOOD PRESSURE: 82 MMHG | TEMPERATURE: 97.9 F | HEART RATE: 66 BPM | WEIGHT: 217.59 LBS | BODY MASS INDEX: 42.72 KG/M2 | HEIGHT: 60 IN | RESPIRATION RATE: 18 BRPM

## 2022-10-05 PROBLEM — N30.00 ACUTE CYSTITIS WITHOUT HEMATURIA: Status: ACTIVE | Noted: 2022-10-05

## 2022-10-05 PROBLEM — R26.2 AMBULATORY DYSFUNCTION: Status: ACTIVE | Noted: 2022-10-05

## 2022-10-05 PROBLEM — R42 VERTIGO: Status: ACTIVE | Noted: 2022-10-05

## 2022-10-05 LAB
ALBUMIN SERPL BCP-MCNC: 3.6 G/DL (ref 3.5–5)
ALP SERPL-CCNC: 64 U/L (ref 46–116)
ALT SERPL W P-5'-P-CCNC: 23 U/L (ref 12–78)
ANION GAP SERPL CALCULATED.3IONS-SCNC: 5 MMOL/L (ref 4–13)
AST SERPL W P-5'-P-CCNC: 16 U/L (ref 5–45)
ATRIAL RATE: 56 BPM
BASOPHILS # BLD AUTO: 0.02 THOUSANDS/ΜL (ref 0–0.1)
BASOPHILS NFR BLD AUTO: 0 % (ref 0–1)
BILIRUB SERPL-MCNC: 1.32 MG/DL (ref 0.2–1)
BUN SERPL-MCNC: 35 MG/DL (ref 5–25)
CALCIUM SERPL-MCNC: 8.9 MG/DL (ref 8.3–10.1)
CHLORIDE SERPL-SCNC: 104 MMOL/L (ref 96–108)
CO2 SERPL-SCNC: 33 MMOL/L (ref 21–32)
CREAT SERPL-MCNC: 1.16 MG/DL (ref 0.6–1.3)
EOSINOPHIL # BLD AUTO: 0.16 THOUSAND/ΜL (ref 0–0.61)
EOSINOPHIL NFR BLD AUTO: 3 % (ref 0–6)
ERYTHROCYTE [DISTWIDTH] IN BLOOD BY AUTOMATED COUNT: 12.9 % (ref 11.6–15.1)
GFR SERPL CREATININE-BSD FRML MDRD: 63 ML/MIN/1.73SQ M
GLUCOSE SERPL-MCNC: 128 MG/DL (ref 65–140)
HCT VFR BLD AUTO: 39 % (ref 36.5–49.3)
HGB BLD-MCNC: 13.7 G/DL (ref 12–17)
IMM GRANULOCYTES # BLD AUTO: 0.04 THOUSAND/UL (ref 0–0.2)
IMM GRANULOCYTES NFR BLD AUTO: 1 % (ref 0–2)
LYMPHOCYTES # BLD AUTO: 2.06 THOUSANDS/ΜL (ref 0.6–4.47)
LYMPHOCYTES NFR BLD AUTO: 32 % (ref 14–44)
MCH RBC QN AUTO: 30.7 PG (ref 26.8–34.3)
MCHC RBC AUTO-ENTMCNC: 35.1 G/DL (ref 31.4–37.4)
MCV RBC AUTO: 87 FL (ref 82–98)
MONOCYTES # BLD AUTO: 0.39 THOUSAND/ΜL (ref 0.17–1.22)
MONOCYTES NFR BLD AUTO: 6 % (ref 4–12)
NEUTROPHILS # BLD AUTO: 3.83 THOUSANDS/ΜL (ref 1.85–7.62)
NEUTS SEG NFR BLD AUTO: 58 % (ref 43–75)
NRBC BLD AUTO-RTO: 0 /100 WBCS
P AXIS: 25 DEGREES
PLATELET # BLD AUTO: 128 THOUSANDS/UL (ref 149–390)
PMV BLD AUTO: 9.4 FL (ref 8.9–12.7)
POTASSIUM SERPL-SCNC: 3.9 MMOL/L (ref 3.5–5.3)
PR INTERVAL: 156 MS
PROCALCITONIN SERPL-MCNC: <0.05 NG/ML
PROT SERPL-MCNC: 6.8 G/DL (ref 6.4–8.4)
QRS AXIS: -42 DEGREES
QRSD INTERVAL: 96 MS
QT INTERVAL: 418 MS
QTC INTERVAL: 403 MS
RBC # BLD AUTO: 4.46 MILLION/UL (ref 3.88–5.62)
SODIUM SERPL-SCNC: 142 MMOL/L (ref 135–147)
T WAVE AXIS: 24 DEGREES
VENTRICULAR RATE: 56 BPM
WBC # BLD AUTO: 6.5 THOUSAND/UL (ref 4.31–10.16)

## 2022-10-05 PROCEDURE — 97163 PT EVAL HIGH COMPLEX 45 MIN: CPT

## 2022-10-05 PROCEDURE — 85025 COMPLETE CBC W/AUTO DIFF WBC: CPT

## 2022-10-05 PROCEDURE — 80053 COMPREHEN METABOLIC PANEL: CPT

## 2022-10-05 PROCEDURE — 99236 HOSP IP/OBS SAME DATE HI 85: CPT | Performed by: FAMILY MEDICINE

## 2022-10-05 PROCEDURE — 97166 OT EVAL MOD COMPLEX 45 MIN: CPT

## 2022-10-05 RX ORDER — MECLIZINE HYDROCHLORIDE 25 MG/1
25 TABLET ORAL EVERY 8 HOURS PRN
Qty: 30 TABLET | Refills: 0 | Status: SHIPPED | OUTPATIENT
Start: 2022-10-05 | End: 2022-10-19 | Stop reason: SDUPTHER

## 2022-10-05 RX ORDER — CEFEPIME HYDROCHLORIDE 2 G/50ML
2000 INJECTION, SOLUTION INTRAVENOUS EVERY 12 HOURS
Status: DISCONTINUED | OUTPATIENT
Start: 2022-10-05 | End: 2022-10-05 | Stop reason: HOSPADM

## 2022-10-05 RX ORDER — HEPARIN SODIUM 5000 [USP'U]/ML
5000 INJECTION, SOLUTION INTRAVENOUS; SUBCUTANEOUS EVERY 8 HOURS SCHEDULED
Status: DISCONTINUED | OUTPATIENT
Start: 2022-10-05 | End: 2022-10-05

## 2022-10-05 RX ORDER — MECLIZINE HYDROCHLORIDE 25 MG/1
25 TABLET ORAL EVERY 8 HOURS PRN
Status: DISCONTINUED | OUTPATIENT
Start: 2022-10-05 | End: 2022-10-05 | Stop reason: HOSPADM

## 2022-10-05 RX ORDER — CITALOPRAM 20 MG/1
20 TABLET ORAL DAILY
Status: DISCONTINUED | OUTPATIENT
Start: 2022-10-05 | End: 2022-10-05 | Stop reason: HOSPADM

## 2022-10-05 RX ORDER — LORAZEPAM 0.5 MG/1
0.5 TABLET ORAL DAILY PRN
Status: DISCONTINUED | OUTPATIENT
Start: 2022-10-05 | End: 2022-10-05 | Stop reason: HOSPADM

## 2022-10-05 RX ORDER — ACETAMINOPHEN 325 MG/1
650 TABLET ORAL EVERY 6 HOURS PRN
Status: DISCONTINUED | OUTPATIENT
Start: 2022-10-05 | End: 2022-10-05 | Stop reason: HOSPADM

## 2022-10-05 RX ORDER — MELOXICAM 7.5 MG/1
15 TABLET ORAL DAILY
Status: DISCONTINUED | OUTPATIENT
Start: 2022-10-05 | End: 2022-10-05 | Stop reason: HOSPADM

## 2022-10-05 RX ORDER — LISINOPRIL 10 MG/1
20 TABLET ORAL DAILY
Status: DISCONTINUED | OUTPATIENT
Start: 2022-10-05 | End: 2022-10-05 | Stop reason: HOSPADM

## 2022-10-05 RX ORDER — ONDANSETRON 2 MG/ML
4 INJECTION INTRAMUSCULAR; INTRAVENOUS EVERY 6 HOURS PRN
Status: DISCONTINUED | OUTPATIENT
Start: 2022-10-05 | End: 2022-10-05 | Stop reason: HOSPADM

## 2022-10-05 RX ORDER — GABAPENTIN 300 MG/1
300 CAPSULE ORAL 3 TIMES DAILY
Status: DISCONTINUED | OUTPATIENT
Start: 2022-10-05 | End: 2022-10-05 | Stop reason: HOSPADM

## 2022-10-05 RX ORDER — CEFDINIR 300 MG/1
300 CAPSULE ORAL EVERY 12 HOURS SCHEDULED
Qty: 14 CAPSULE | Refills: 0 | Status: SHIPPED | OUTPATIENT
Start: 2022-10-05 | End: 2022-10-12

## 2022-10-05 RX ORDER — OXYBUTYNIN CHLORIDE 5 MG/1
5 TABLET ORAL 2 TIMES DAILY
Status: DISCONTINUED | OUTPATIENT
Start: 2022-10-05 | End: 2022-10-05 | Stop reason: HOSPADM

## 2022-10-05 RX ADMIN — OXYBUTYNIN CHLORIDE 5 MG: 5 TABLET ORAL at 08:23

## 2022-10-05 RX ADMIN — LISINOPRIL 20 MG: 10 TABLET ORAL at 08:24

## 2022-10-05 RX ADMIN — CEFEPIME HYDROCHLORIDE 2000 MG: 2 INJECTION, SOLUTION INTRAVENOUS at 02:08

## 2022-10-05 RX ADMIN — MECLIZINE HYDROCHLORIDE 25 MG: 25 TABLET ORAL at 08:28

## 2022-10-05 RX ADMIN — CITALOPRAM HYDROBROMIDE 20 MG: 20 TABLET ORAL at 08:24

## 2022-10-05 RX ADMIN — GABAPENTIN 300 MG: 300 CAPSULE ORAL at 08:24

## 2022-10-05 RX ADMIN — MELOXICAM 15 MG: 7.5 TABLET ORAL at 08:28

## 2022-10-05 NOTE — ASSESSMENT & PLAN NOTE
· Hx spinal cord injury , neurogenic bladder   · Baseline ambulatory and independent - chronic left arm contracture and left foot drop  · Continue straight cath 4x daily

## 2022-10-05 NOTE — ASSESSMENT & PLAN NOTE
· Likely secondary to vertigo  · Reports active and independent at baseline     · Consult PT/OT for evaluation

## 2022-10-05 NOTE — ASSESSMENT & PLAN NOTE
· Presents from home with dizziness and ambulatory dysfunction that started over the last week  Reports episode of dry heaves and nausea day of admission  Dizziness occurs only with position changes  No history of stroke/TIA  · No focal deficit- chronic left hand contracture , left foot drop from previous spinal cord injury  · CT head negative for acute process  · EKG Sinus bradycardia 56 - patient reports this is baseline HR  · In ED, received 1L IVF and meclizine with improvement     · Continue Meclizine   · Check orthostatic BP  · Consider MRI if no improvement in symptoms   · Consult PT/OT/CM

## 2022-10-05 NOTE — ASSESSMENT & PLAN NOTE
· Hx per care everywhere and patient  · Patient reports bruises easily  · Given history and thrombocytopenia, will hold on DVT ppx

## 2022-10-05 NOTE — PLAN OF CARE
Problem: OCCUPATIONAL THERAPY ADULT  Goal: Performs self-care activities at highest level of function for planned discharge setting  See evaluation for individualized goals  Description: Treatment Interventions: ADL retraining, Functional transfer training, UE strengthening/ROM, Endurance training, Patient/family training, Equipment evaluation/education, Neuromuscular reeducation, Compensatory technique education, Continued evaluation, Energy conservation, Activityengagement          See flowsheet documentation for full assessment, interventions and recommendations  Note: Limitation: Decreased ADL status, Decreased UE strength, Decreased Safe judgement during ADL, Decreased endurance, Decreased high-level ADLs, Decreased self-care trans  Prognosis: Good  Assessment: Pt is a 80 y o  male, admitted to [address] [place] [address] 10/4/2022 d/t experiencing increased dizziness  Dx: vertigo  Pt with PMHx impacting their performance during ADL tasks, including: central cord syndrome, thrombocytopenia, allergic rhinitis, anxiety, depression, C2 spinal injury, self-catheterizes, shingles  Prior to admission to the hospital Pt was performing ADLs without physical assistance  IADLs without physical assistance  Functional transfers/ambulation without physical assistance  Cognitive status was PTA was intact  OT order placed to assess Pt's ADLs, cognitive status, and performance during functional tasks in order to maximize safety and independence while making most appropriate d/c recommendations   Pt's clinical presentation is currently evolving given new onset deficits that effect Pt's occupational performance and ability to safely return to OF including decrease activity tolerance, decrease performance during ADL tasks, decrease safety awareness , decrease UB MS, decrease generalized strength, decrease activity engagement, decrease performance during functional transfers and decrease FM control combined with medical complications of bradycardia, decreased skin integrity and need for input for mobility technique/safety  Personal factors affecting Pt at time of initial evaluation include: multi-level environment, past experience, inability to perform ADLs and questionable non-compliance  Pt will benefit from continued skilled OT services to address deficits as defined above and to maximize level independence/participation during ADLs and functional tasks to facilitate return toward PLOF and improved quality of life  From an occupational therapy standpoint, recommendation at time of d/c would be no further OT needs       OT Discharge Recommendation: No rehabilitation needs

## 2022-10-05 NOTE — ED NOTES
Attempted to ambulate patient  Upon sitting and standing the patient reported feeling less dizzy than on arrival but continues to report dizziness  Patient noted to be unsteady and wobbly/swaying when standing and was not able to ambulate safely without the assist of staff  Pt returned to stretcher, resting in position of reported comfort  Reports dizziness to improve while at rest  Dr Dubose July aware  Plan for admission        Felipa Luther RN  10/04/22 1685

## 2022-10-05 NOTE — ASSESSMENT & PLAN NOTE
· Likely secondary to vertigo  · Reports active and independent at baseline     · Consult PT/OT for evaluation evaluated PT OT ambulated the whole ER without any issues no rehabilitation needs just outpatient therapy for which prescriptions were given

## 2022-10-05 NOTE — DISCHARGE SUMMARY
[address]  Discharge- Yadira Tamayo 1950, 80 y o  male MRN: [id number]  Unit/Bed#: ED 08 Encounter: [id number]  Primary Care Provider: Espinoza Peters MD   Date and time admitted to hospital: 10/4/2022  7:19 PM    Acute cystitis without hematuria  Assessment & Plan  · Hx neurogenic bladder from previous spinal cord injury, self catheterizes four times daily  · UA showing nitrates, pyuria, bacteruria  · Does not meet sepsis criteria- afebrile, no leukocytosis  · Follow up urine culture, start Cefepime (penicillin allergy)  · Will discharge on cefdinir 300 mg p o  b i d  for 7 days will follow-up urine culture if needed will change antibiotics    Ambulatory dysfunction  Assessment & Plan  · Likely secondary to vertigo  · Reports active and independent at baseline  · Consult PT/OT for evaluation evaluated PT OT ambulated the whole ER without any issues no rehabilitation needs just outpatient therapy for which prescriptions were given    Von Willebrand's disease  Assessment & Plan  · Hx per care everywhere and patient  · Patient reports bruises easily  · Given history and thrombocytopenia, will hold on DVT ppx      Thrombocytopenia (HCC)  Assessment & Plan  · Plts 124 on admission , baseline around 120-150  · Monitor CBC    Neuropathy  Assessment & Plan  · Continue Gabapentin TID    Central cord syndrome (HCC)  Assessment & Plan  · Hx spinal cord injury , neurogenic bladder   · Baseline ambulatory and independent - chronic left arm contracture and left foot drop  · Continue straight cath 4x daily    * Vertigo  Assessment & Plan  · Presents from home with dizziness and ambulatory dysfunction that started over the last week  Reports episode of dry heaves and nausea day of admission  Dizziness occurs only with position changes  No history of stroke/TIA  · No focal deficit- chronic left hand contracture , left foot drop from previous spinal cord injury     · CT head negative for acute process  · Resolved suspect was due to dehydration as he has specific gravity was 1 020 he is not drinking a lot also found to have acute infection  He has been given a L of fluids and antibiotics he ambulated the whole ER with OT and PT without any recurrence of discolored BM will be discharged home recommendation is outpatient therapy for which was prescribed will send him on antibiotics and did encourage fluid hydration at home as well  Meclizine did help as was given last night so will send meclizine p r n  as well        Medical Problems             Resolved Problems  Date Reviewed: 10/5/2022   None               Discharging Physician / Practitioner: Nishant Gunn  PCP: Alease Apgar, MD  Admission Date:   Admission Orders (From admission, onward)     Ordered        10/04/22 2215  Place in Observation  Once                      Discharge Date: 10/05/22    Consultations During Hospital Stay:  · PT OT    Procedures Performed:   · None    Significant Findings / Test Results:   · CT of the brain negative for acute abnormality    Incidental Findings:   · None    Test Results Pending at Discharge (will require follow up):   · Urine culture     Outpatient Tests Requested:  · none    Complications:  none    Reason for Admission:  Vertigo    Hospital Course:   Darlin Ferguson is a 80 y o  male patient who originally presented to the hospital on 10/4/2022 due to vertigo that has been going on for almost a week  He was given some nasal spray by PCP which did not help  Patient has not been drinking a lot he self catheterizes was found to be slightly dehydrated with elevated specific gravity and acute cystitis given a L of fluid CT of the brain was negative for any acute abnormalities he had no new focality  He actually was given a dose of meclizine as well start her antibiotics and was ambulated by Occupational therapy with physical therapy without any recurrence of vertigo ambulatory dysfunction  Will discharge on antibiotics of cefdinir for 7 days and also discussed with him importance of hydration and outpatient PT OT  As recommended        Please see above list of diagnoses and related plan for additional information  Condition at Discharge: stable    Discharge Day Visit / Exam:   * Please refer to separate progress note for these details *    Discussion with Family: Attempted to update  (wife) via phone  Unable to contact  Discharge instructions/Information to patient and family:   See after visit summary for information provided to patient and family  Provisions for Follow-Up Care:  See after visit summary for information related to follow-up care and any pertinent home health orders  Disposition:   Home    Planned Readmission: no     Discharge Statement:  I spent >35 minutes discharging the patient  This time was spent on the day of discharge  I had direct contact with the patient on the day of discharge  Greater than 50% of the total time was spent examining patient, answering all patient questions, arranging and discussing plan of care with patient as well as directly providing post-discharge instructions  Additional time then spent on discharge activities  Discharge Medications:  See after visit summary for reconciled discharge medications provided to patient and/or family        **Please Note: This note may have been constructed using a voice recognition system**

## 2022-10-05 NOTE — ASSESSMENT & PLAN NOTE
· Hx neurogenic bladder from previous spinal cord injury, self catheterizes four times daily  · UA showing nitrates, pyuria, bacteruria  · Does not meet sepsis criteria- afebrile, no leukocytosis     · Follow up urine culture, start Cefepime (penicillin allergy)  · Will discharge on cefdinir 300 mg p o  b i d  for 7 days will follow-up urine culture if needed will change antibiotics

## 2022-10-05 NOTE — PLAN OF CARE
Problem: PHYSICAL THERAPY ADULT  Goal: Performs mobility at highest level of function for planned discharge setting  See evaluation for individualized goals  Description: Treatment/Interventions: ADL retraining, Functional transfer training, LE strengthening/ROM, Elevations, Therapeutic exercise, Endurance training, Patient/family training, Equipment eval/education, Bed mobility, Gait training, Compensatory technique education, Spoke to nursing, Spoke to case management, OT  Equipment Recommended:  (use of cane-pt reports having)       See flowsheet documentation for full assessment, interventions and recommendations  Note: Prognosis: Good  Problem List: Decreased strength, Decreased endurance, Impaired balance, Decreased mobility, Decreased coordination, Obesity  Assessment: Pt is a 80 y o  male seen for PT evaluation s/p admission to [address] [place] [address] on 10/4/2022 with Vertigo  Order placed for PT services  Upon evaluation: Pt is presenting with impaired functional mobility due to decreased strength, decreased endurance, impaired balance, impaired coordination, gait deviations, and fall risk requiring  supervision assistance for bed mobility, stand by to steadying assistance for transfers, and steadying assistance for ambulation with out AD and SBA for transfers and ambulation with spc   Pt's clinical presentation is currently unpredictable given the functional mobility deficits above, especially weakness, decreased endurance, impaired coordination, and gait deviations, coupled with fall risks as indicated by AM-PAC 6-Clicks: 08/63 as well as hx of falls, impaired balance, and polypharmacy and combined with medical complications of abnormal renal lab values, need for input for mobility technique/safety and acute cystitis without hematuria, thrombocytopenia, vertigo    Pt's PMHx and comorbidities that may affect physical performance and progress include: neuropathy and Von Willebrand's disease, Central cord syndrome with h/o C2 SCI, anxiety, thrombocytopenia, arthritis, depression, HTN  Personal factors affecting pt at time of IE include: multi-level environment, inability to perform IADLs, inability to navigate level surfaces without external assistance and recent fall(s)/fall history  Pt will benefit from continued skilled PT services to address deficits as defined above and to maximize level of functional mobility to facilitate return toward PLOF and improved QOL  From PT/mobility standpoint, recommendation at time of d/c would be OP PT, home with family support and with cane pending progress in order to reduce fall risk and maximize pt's functional independence and consistency with mobility in order to facilitate return to PLOF  Recommend trial with cane next 1-2 sessions and ther ex next 1-2 sessions  Barriers to Discharge: None  Barriers to Discharge Comments: has support form spouse prn  PT Discharge Recommendation: Home with outpatient rehabilitation    See flowsheet documentation for full assessment

## 2022-10-05 NOTE — OCCUPATIONAL THERAPY NOTE
Occupational Therapy Evaluation     Patient Name: Minnie Mistry  [id number] Date: 10/5/2022  Problem List  Principal Problem:    Vertigo  Active Problems:    Central cord syndrome ([facility] )    Neuropathy    Thrombocytopenia ([facility] )    Von Willebrand's disease    Ambulatory dysfunction    Acute cystitis without hematuria    Past Medical History  Past Medical History:   Diagnosis Date    Allergic NA    Allergic rhinitis     Anxiety 7/1998    Arthritis     C2 spinal cord injury ([facility] )     Clotting disorder ([facility] ) 7/1998    Depression     Essential hypertension     Hypertension 7/2018    Self-catheterizes urinary bladder     Shingles 7/2017    Urinary tract infection 7/2000    Randall Aiken Willebrand disease     Pt requires DDAVP prior to "major" surgery      Past Surgical History  Past Surgical History:   Procedure Laterality Date    CHOLECYSTECTOMY      FEEDING TUBE PLACEMENT      GASTROSTOMY  7/2000         10/05/22 1012   Note Type   Note type Evaluation   Restrictions/Precautions   Other Precautions Bed Alarm; Fall Risk;Multiple lines   Pain Assessment   Pain Assessment Tool 0-10   Pain Score No Pain   Home Living   Type of Home House  (0 IAN)   Home Layout Two level;Bed/bath upstairs  (7 steps to 2nd floor B HR  full bath also available on 1st floor )   Bathroom Shower/Tub Tub/shower unit   Bathroom Toilet Raised   Bathroom Equipment Grab bars in shower   Home Equipment Wheelchair-manual;Walker;Cane  (does not use PTA)   Additional Comments Pt reports living in a 2 story home with 0 IAN  7 steps to 2nd floor B HR  Pt ambulates with no AD at baseline   Prior Function   Level of [place] Independent with ADLs and functional mobility   Lives With Spouse   Receives Help From Family   ADL Assistance Independent   IADLs Independent   Falls in the last 6 months 1 to 4   Vocational Retired   Comments Pt reports completing ADLs, IADLs, functional ambulation and community mobility + driving @ I   Pt reports having some baseline deficits from a previous injury resulting in being paralyzed for a long period of time  ADL   Where Assessed Edge of bed   Grooming Assistance 5  Supervision/Setup   Grooming Deficit Setup   UB Dressing Assistance 5  Supervision/Setup   UB Dressing Deficit Setup   LB Dressing Assistance   ([address] )   LB Dressing Deficit Steadying;Verbal cueing;Supervision/safety; Increased time to complete; Don/doff R sock; Don/doff L sock; Thread RLE into pants; Thread LLE into pants;Pull up over hips   Additional Comments Pt completing ADL tasks while seated at EOB  UB Dressing and grooming tasks @ S after set-up,  LB Dressing @ Steadying Assist including donning socks/pants around feet and CM aroudn waist  Increased time/effort note during donning socks  no AD for UB support during CM around waist    Bed Mobility   Supine to Sit 5  Supervision   Additional items Increased time required; Bedrails   Sit to Supine 5  Supervision   Additional items Increased time required;Verbal cues   Additional Comments Pt reports having a bedrail at home  HOB flat  Transfers   Sit to Stand   (SBA)   Additional items Increased time required;Verbal cues   Stand to Sit   (SBA)   Additional items Increased time required;Verbal cues   Stand pivot   (Steadying Assist progressing to SBA)   Additional items Increased time required;Verbal cues   Additional Comments Pt completing functional transfers with no AD for UB support   SBA for STS and SPT @ Steadying Assist (progressing to SBA) with occasional cues for safety/technque   Balance   Static Sitting Good   Dynamic Sitting Fair +   Static Standing Fair   Dynamic Standing Fair -   Activity Tolerance   Activity Tolerance Patient limited by fatigue   Medical Staff Made Aware Spoke with PT, Lillis Libman   Nurse Made Aware Spoke with RN   RUE Assessment   RUE Assessment WNL   LUE Assessment   LUE Assessment X   Hand Function   Gross Motor Coordination Impaired  (impaired LUE)   Fine Motor Coordination Impaired  (impaired LUE)   Sensation   Light Touch No apparent deficits   Additional Comments Pt reports he "fell through a roof" years ago and was paralyzed  Since then, Pt's LUE has lost ROM in shoulder and elbow  Pt's L wrist and finger ROM is impaired significantly  Pt's L hand remains contracted, unable to open fingers  Pt educated on thorough hygiene and grooming to ensure cleanliness and reduce risk for skin breakdown  Cognition   Overall Cognitive Status WFL   Arousal/Participation Alert; Responsive; Cooperative   Attention Attends with cues to redirect   Orientation Level Oriented X4   Memory Within functional limits   Following Commands Follows one step commands without difficulty   Assessment   Limitation Decreased ADL status; Decreased UE strength;Decreased Safe judgement during ADL;Decreased endurance;Decreased high-level ADLs; Decreased self-care trans   Prognosis Good   Assessment Pt is a 80 y o  male, admitted to [address] [place] [address] 10/4/2022 d/t experiencing increased dizziness  Dx: vertigo  Pt with PMHx impacting their performance during ADL tasks, including: central cord syndrome, thrombocytopenia, allergic rhinitis, anxiety, depression, C2 spinal injury, self-catheterizes, shingles  Prior to admission to the hospital Pt was performing ADLs without physical assistance  IADLs without physical assistance  Functional transfers/ambulation without physical assistance  Cognitive status was PTA was intact  OT order placed to assess Pt's ADLs, cognitive status, and performance during functional tasks in order to maximize safety and independence while making most appropriate d/c recommendations   Pt's clinical presentation is currently evolving given new onset deficits that effect Pt's occupational performance and ability to safely return to OF including decrease activity tolerance, decrease performance during ADL tasks, decrease safety awareness , decrease UB MS, decrease generalized strength, decrease activity engagement, decrease performance during functional transfers and decrease FM control combined with medical complications of bradycardia, decreased skin integrity and need for input for mobility technique/safety  Personal factors affecting Pt at time of initial evaluation include: multi-level environment, past experience, inability to perform ADLs and questionable non-compliance  Pt will benefit from continued skilled OT services to address deficits as defined above and to maximize level independence/participation during ADLs and functional tasks to facilitate return toward PLOF and improved quality of life  From an occupational therapy standpoint, recommendation at time of d/c would be no further OT needs  Plan   Treatment Interventions ADL retraining;Functional transfer training;UE strengthening/ROM; Endurance training;Patient/family training;Equipment evaluation/education; Neuromuscular reeducation; Compensatory technique education;Continued evaluation; Energy conservation; Activityengagement   Goal Expiration Date 10/19/22   OT Frequency 1-2x/wk   Recommendation   OT Discharge Recommendation No rehabilitation needs   AM-PAC Daily Activity Inpatient   Lower Body Dressing 3   Bathing 3   Toileting 3   Upper Body Dressing 3   Grooming 3   Eating 4   Daily Activity Raw Score 19   Daily Activity Standardized Score (Calc for Raw Score >=11) 40 22   [facility] Applied Cognition Inpatient   Following a Speech/Presentation 4   Understanding Ordinary Conversation 4   Taking Medications 4   Remembering Where Things Are Placed or Put Away 4   Remembering List of 4-5 Errands 4   Taking Care of Complicated Tasks 4   Applied Cognition Raw Score 24   Applied Cognition Standardized Score 62 21     The patient's raw score on the AM-PAC Daily Activity inpatient short form is 19, standardized score is 40 22, greater than 39 4  Patients at this level are likely to benefit from DC to home   Please refer to the recommendation of the Occupational Therapist for safe DC planning  Pt goals to be met by 10/19/2022    Pt will demonstrate ability to complete LB dressing @ I in order to increase safety and independence during meaningful tasks  Pt will demonstrate ability to lilliam/doff socks/shoes while sitting EOB @ I in order to increase safety and independence during meaningful tasks  Pt will demonstrate ability to complete toileting tasks including CM and pericare @ I in order to increase safety and independence during meaningful tasks  Pt will demonstrate ability to complete EOB, chair, toilet/commode transfers @ I in order to increase performance and participation during functional tasks  Pt will demonstrate ability to stand for 10+ minutes while maintaining G balance with use of no AD for UB support  Pt will demonstrate ability to tolerate 30-35 minute OT session with no vc'ing for deep breathing or use of energy conservation techniques in order to increase activity tolerance during functional tasks  Pt will demonstrate Good carryover of use of energy conservation/compensatory strategies during ADLs and functional tasks in order to increase safety and reduce risk for falls  Pt will demonstrate Good attention and participation in continued evaluation of functional ambulation house hold distances in order to assist with safe d/c planning  Pt will attend to continued cognitive assessments 100% of the time in order to provide most appropriate d/c recommendations  Pt will follow 100% simple 2-step commands and be A&O x4 consistently with environmental cues to increase participation in functional activities  Pt will identify 3 areas of interest/hobbies and 1 intervention on how to incorporate into daily life in order to increase interaction with environment and peers as well as increase participation in meaningful tasks     Pt will demonstrate 100% carryover of BUE HEP in order to increase BUE MS and increase performance during functional tasks upon d/c home      Ronel Shafer OTR/L

## 2022-10-05 NOTE — ASSESSMENT & PLAN NOTE
· Hx neurogenic bladder from previous spinal cord injury, self catheterizes four times daily  · UA showing nitrates, pyuria, bacteruria  · Does not meet sepsis criteria- afebrile, no leukocytosis     · Follow up urine culture, start Cefepime (penicillin allergy)  · Check procalcitonin, trend CBC

## 2022-10-05 NOTE — PHYSICAL THERAPY NOTE
PHYSICAL THERAPY EVALUATION  NAME:  Shaji Perry  DATE: 10/05/22    AGE:   80 y o  Mrn:   [id number]  ADMIT DX:  No admission diagnoses are documented for this encounter  Past Medical History:   Diagnosis Date    Allergic NA    Allergic rhinitis     Anxiety 7/1998    Arthritis     [facility] )     [facility] 7/1998    Depression     Essential hypertension     Hypertension 7/2018    Self-catheterizes urinary bladder     Shingles 7/2017    Urinary tract infection 7/2000    Von Willebrand disease     Pt requires DDAVP prior to "major" surgery      Length Of Stay: 0  Performed at least 2 patient identifiers during session: Name and Birthday  PHYSICAL THERAPY EVALUATION :    10/05/22 1013   PT Last Visit   PT Visit Date 10/05/22   Note Type   Note type Evaluation   Pain Assessment   Pain Assessment Tool 0-10   Pain Score No Pain   Restrictions/Precautions   Other Precautions Bed Alarm; Chair Alarm; Fall Risk   Home Living   Type of Home House  (no IAN)   Home Layout Two level;Bed/bath upstairs  (7 steps to 2nd floor B HRs  Has full bath available on 1st floor )   Bathroom Shower/Tub Tub/shower unit   [employer] Raised   Bathroom Equipment Grab bars in [facility]   (didn't use pTA)   Additional Comments Reports living in a [facility] with no IAN and 7 steps to 2nd floor with B HRs  Reports not using an AD PTA  Prior Function   Level of [place] Independent with ADLs and functional mobility   Lives With Spouse   Receives Help From Family   ADL Assistance Independent   IADLs Independent   Falls in the last 6 months 1 to 4   Comments Reports being independent with mobility, ADLs and IADLs  + driving  Reports left hemiplegia from fall resulting in paralysis     Cognition   Orientation Level Oriented X4   Following Commands Follows one step commands without difficulty   Subjective   Subjective "I'm not dizzy anymore "   RLE Assessment RLE Assessment WFL  (4/5)   LLE Assessment   LLE Assessment WFL  (3-/5; pt with WFL AROM of ankle DF)   Coordination   Movements are Fluid and Coordinated 0   Coordination and Movement Description ataxia left LE on swing through, impaired timing of ankle DF on swing through   Light Touch   RLE Light Touch Grossly intact   LLE Light Touch Grossly intact   Bed Mobility   Supine to Sit 5  Supervision   Additional items Increased time required;Verbal cues; Bedrails   Sit to Supine 5  Supervision   Additional items Increased time required;Verbal cues   Additional Comments HOB flat, but use of bedrail  completed with supervision with inc time  Transfers   Sit to Stand   (SBA)   Additional items Increased time required;Verbal cues   Stand to Sit   (SBA)   Additional items Increased time required;Verbal cues   Stand pivot   (steadying assistance without AD and SBA with spc)   Additional Comments no AD initially  sit<>stand with SBA without AD and spc  pt pushing against bed at times, increased time and effort to achieve standing, wide CHIO  min cues for hand placement with spc in right hand as pt's left hand is contracted and unable to  object or push from bed  spt without AD with steadying assistance with inc time, with use of spc with SBA  Ambulation/Elevation   Gait pattern Wide CHIO; Decreased foot clearance; Ataxia; Short stride; Excessively slow; Step through pattern   Gait Assistance   (steadying assistance without AD and SBA with spc)   Additional items Assist x 1;Verbal cues   Assistive Device None;SPC   Distance 160'x1 without AD with steadying assistance with wide CHIO-scissoring at times, decreased left foot clearance with decreased timing of ankle DF on swing through and ataxia  pt reports he wear out the toes of his shoe quickly  pt with inc path deviation  trialed ambulation with spc  ambulated 160' with spc with SBA with slow lane, decreased step length and slight dec foot clearance   pt with decreased path deviation compared to without AD  reports increased stability with spc  Stair Management Assistance   (steadying-->SBA)   Additional items Assist x 1;Verbal cues; Increased time required   Stair Management Technique One rail R;Alternating pattern; Step to pattern; Foreward   Number of Stairs 13   Ambulation/Elevation Additional Comments decreased left toe clearance with left foot eleavtion to step  cues for sequence for step to and increased foot clearance to decrease fall risk  Balance   Static Sitting Good   Dynamic Sitting Fair +   Static Standing Fair   Dynamic Standing Fair -   Ambulatory Fair -   Activity Tolerance   Activity Tolerance Patient limited by fatigue   Medical Staff Made Aware Arti HARRIS   Nurse Made Aware RN   Assessment   Prognosis Good   Problem List Decreased strength;Decreased endurance; Impaired balance;Decreased mobility; Decreased coordination;Obesity   Barriers to Discharge None   Barriers to Discharge Comments has support form spouse prn   Goals   Patient Goals "Go home"   STG Expiration Date 10/19/22   PT Treatment Day 0   Plan   Treatment/Interventions ADL retraining;Functional transfer training;LE strengthening/ROM; Elevations; Therapeutic exercise; Endurance training;Patient/family training;Equipment eval/education; Bed mobility;Gait training; Compensatory technique education;Spoke to nursing;Spoke to case management;OT   PT Frequency 1-2x/wk   Recommendation   PT Discharge Recommendation Home with outpatient rehabilitation   Equipment Recommended   (use of cane-pt reports having)   Additional Comments would benefit form inc support prn and assistance with stair completion upon reutrn to home     AM-PAC Basic Mobility Inpatient   Turning in Bed Without Bedrails 3   Lying on Back to Sitting on Edge of Flat Bed 3   Moving Bed to Chair 3   Standing Up From Chair 3   Walk in Room 3   Climb 3-5 Stairs 3   Basic Mobility Inpatient Raw Score 18   Basic Mobility Standardized Score 41 05 Highest Level Of Mobility   -HLM Goal 6: Walk 10 steps or more   JH-HLM Achieved 8: Walk 250 feet ot more   End of Consult   Patient Position at End of Consult Supine;Bed/Chair alarm activated; All needs within reach     (Please find full objective findings from PT assessment regarding body systems outlined above)  Assessment: Pt is a 80 y o  male seen for PT evaluation s/p admission to [address] [place] [address] on 10/4/2022 with Vertigo  Order placed for PT services  Upon evaluation: Pt is presenting with impaired functional mobility due to decreased strength, decreased endurance, impaired balance, impaired coordination, gait deviations, and fall risk requiring  supervision assistance for bed mobility, stand by to steadying assistance for transfers, and steadying assistance for ambulation with out AD and SBA for transfers and ambulation with spc   Pt's clinical presentation is currently unpredictable given the functional mobility deficits above, especially weakness, decreased endurance, impaired coordination, and gait deviations, coupled with fall risks as indicated by AM-PAC 6-Clicks: 87/64 as well as hx of falls, impaired balance, and polypharmacy and combined with medical complications of abnormal renal lab values, need for input for mobility technique/safety and acute cystitis without hematuria, thrombocytopenia, vertigo  Pt's PMHx and comorbidities that may affect physical performance and progress include: neuropathy and Von Willebrand's disease, Central cord syndrome with h/o C2 SCI, anxiety, thrombocytopenia, arthritis, depression, HTN  Personal factors affecting pt at time of IE include: multi-level environment, inability to perform IADLs, inability to navigate level surfaces without external assistance and recent fall(s)/fall history   Pt will benefit from continued skilled PT services to address deficits as defined above and to maximize level of functional mobility to facilitate return toward PLOF and improved QOL  From PT/mobility standpoint, recommendation at time of d/c would be OP PT, home with family support and with cane pending progress in order to reduce fall risk and maximize pt's functional independence and consistency with mobility in order to facilitate return to PLOF  Recommend trial with cane next 1-2 sessions and ther ex next 1-2 sessions  The patient's AM-PAC Basic Mobility Inpatient Short Form Raw Score is 18  A Raw score of greater than 16 suggests the patient may benefit from discharge to home  Please also refer to the recommendation of the Physical Therapist for safe discharge planning  Goals: Pt will: Perform bed mobility tasks with modified I to reposition in bed and prepare for transfers  Pt will perform transfers with modified I to decrease burden of care and decrease risk for falls and prepare for ambulation  Pt will ambulate with LRAD for >/= 300' with  modified I  to decrease burden of care, decrease risk for falls, improve activity tolerance and improve gait quality and to access home environment  Pt will complete >/= 14 steps with with unilateral handrail with modified I to return to multilevel home, decrease risk for falls and improve activity tolerance  Pt will participate in objective balance assessment to determine baseline fall risk  Pt will increase B LE strength >/= 1/2 MMT grade to facilitate functional mobility        Ana Bethea

## 2022-10-05 NOTE — ASSESSMENT & PLAN NOTE
· Presents from home with dizziness and ambulatory dysfunction that started over the last week  Reports episode of dry heaves and nausea day of admission  Dizziness occurs only with position changes  No history of stroke/TIA  · No focal deficit- chronic left hand contracture , left foot drop from previous spinal cord injury  · CT head negative for acute process  · Resolved suspect was due to dehydration as he has specific gravity was 1 020 he is not drinking a lot also found to have acute infection  He has been given a L of fluids and antibiotics he ambulated the whole ER with OT and PT without any recurrence of discolored BM will be discharged home recommendation is outpatient therapy for which was prescribed will send him on antibiotics and did encourage fluid hydration at home as well    Meclizine did help as was given last night so will send meclizine p r n  as well

## 2022-10-05 NOTE — ED NOTES
Pt has a hx of neurogenic bladder, self straight cath's at home  Wife brought in catheter supplies  Pt currently catheterizing himself        Luigi Hansen RN  10/04/22 9760

## 2022-10-05 NOTE — H&P
[id number] ([place] [address]) 1950, 80 y o  male MRN: [id number]  Unit/Bed#: ED 08 Encounter: [id number]  Primary Care Provider: Bora Cagle MD   Date and time admitted to hospital: 10/4/2022  7:19 PM    * Vertigo  Assessment & Plan  · Presents from home with dizziness and ambulatory dysfunction that started over the last week  Reports episode of dry heaves and nausea day of admission  Dizziness occurs only with position changes  No history of stroke/TIA  · No focal deficit- chronic left hand contracture , left foot drop from previous spinal cord injury  · CT head negative for acute process  · EKG Sinus bradycardia 56 - patient reports this is baseline HR  · In ED, received 1L IVF and meclizine with improvement  · Continue Meclizine   · Check orthostatic BP  · Consider MRI if no improvement in symptoms   · Consult PT/OT/CM    Ambulatory dysfunction  Assessment & Plan  · Likely secondary to vertigo  · Reports active and independent at baseline  · Consult PT/OT for evaluation     Acute cystitis without hematuria  Assessment & Plan  · Hx neurogenic bladder from previous spinal cord injury, self catheterizes four times daily  · UA showing nitrates, pyuria, bacteruria  · Does not meet sepsis criteria- afebrile, no leukocytosis     · Follow up urine culture, start Cefepime (penicillin allergy)  · Check procalcitonin, trend CBC    Central cord syndrome (HCC)  Assessment & Plan  · Hx spinal cord injury , neurogenic bladder   · Baseline ambulatory and independent - chronic left arm contracture and left foot drop  · Continue straight cath 4x daily    Von Willebrand's disease  Assessment & Plan  · Hx per care everywhere and patient  · Patient reports bruises easily  · Given history and thrombocytopenia, will hold on DVT ppx      Thrombocytopenia (HCC)  Assessment & Plan  · Plts 124 on admission , baseline around 120-150  · Monitor CBC    Neuropathy  Assessment & Plan  · Continue Gabapentin TID    VTE Pharmacologic Prophylaxis: VTE Score: 4 Moderate Risk (Score 3-4) - Pharmacological DVT Prophylaxis Contraindicated  Sequential Compression Devices Ordered  Code Status: Level 1 - Full Code   Discussion with family: Patient declined call to   Anticipated Length of Stay: Patient will be admitted on an observation basis with an anticipated length of stay of less than 2 midnights secondary to vertigo, amb dysfunction , cystitis- iv abx,  pt/ot   Total Time for Visit, including Counseling / Coordination of Care: 70 minutes Greater than 50% of this total time spent on direct patient counseling and coordination of care  Chief Complaint: dizziness, ambulatory dysfunction     History of Present Illness:  Nicolás Carson is a 80 y o  male with a PMH of VWD, central cord syndrome, thrombocytopenia who presents with dizziness and ambulatory dysfunction that started a week ago  Reports worsened the day of admission, became dizzy with position changes and was having difficulty with his balance  Denies syncope  Reports episode of nausea with dry heaves, has since resolved  Denies any prior history of stroke  Reports he is active and independent at baseline- limited ROM in left hand (contracted) and left foot drop  Denies numbness, weakness, speech changes, fevers, chills, chest pain, shortness of breath, GI complaints  Review of Systems:  Review of Systems   Constitutional: Positive for activity change  Negative for chills, fatigue and fever  Respiratory: Negative for cough and shortness of breath  Cardiovascular: Negative for chest pain, palpitations and leg swelling  Gastrointestinal: Negative for abdominal pain, constipation, diarrhea, nausea and vomiting  Genitourinary: Negative for flank pain  Neurogenic bladder- self straight cath    Musculoskeletal: Positive for gait problem  Negative for arthralgias and back pain          Chronic neuropathy    Neurological: Negative for dizziness, syncope, speech difficulty, weakness, light-headedness, numbness and headaches  Hematological: Bruises/bleeds easily  All other systems reviewed and are negative  Past Medical and Surgical History:   Past Medical History:   Diagnosis Date    Allergic NA    Allergic rhinitis     Anxiety 7/1998    Arthritis     [facility] [address] [facility]     [facility] 7/1998    Depression     Essential hypertension     Hypertension 7/2018    Self-catheterizes urinary bladder     Shingles 7/2017    Urinary tract infection 7/2000    Von Willebrand disease     Pt requires DDAVP prior to "major" surgery        Past Surgical History:   Procedure Laterality Date    CHOLECYSTECTOMY      FEEDING TUBE PLACEMENT      GASTROSTOMY  7/2000       Meds/Allergies:  Prior to Admission medications    Medication Sig Start Date End Date Taking?  Authorizing Provider   acetaminophen (TYLENOL) 500 mg tablet Take 1,000 mg by mouth 2 (two) times a day    Historical Provider, MD   citalopram (CeleXA) 20 mg tablet TAKE 1 TABLET BY MOUTH  DAILY 4/25/22   Lisa Foster MD   cyclobenzaprine (FLEXERIL) 10 mg tablet Take 1 tablet (10 mg total) by mouth 3 (three) times a day as needed for muscle spasms  Patient not taking: Reported on 10/5/2022 3/16/22   Lisa Foster MD   gabapentin (Neurontin) 300 mg capsule Take 1 capsule (300 mg total) by mouth 3 (three) times a day 2/19/20 8/18/22  Lisa Foster MD   glucosamine-chondroitin 500-400 MG tablet Take 1 tablet by mouth daily    Historical Provider, MD   ketoconazole (NIZORAL) 2 % cream Apply topically daily 9/26/22   Lisa Foster MD   lisinopril (ZESTRIL) 20 mg tablet TAKE 1 TABLET BY MOUTH  DAILY 4/25/22   Lisa Foster MD   LORazepam (ATIVAN) 0 5 mg tablet TAKE 1 TABLET BY MOUTH  DAILY AS NEEDED FOR ANXIETY 4/25/22   Lisa Foster MD   meloxicam (MOBIC) 15 mg tablet Take 1 tablet (15 mg total) by mouth daily 2/19/20 8/18/22  Sonny Varela MD   Multiple Vitamins-Minerals (MULTIVITAMIN ADULTS PO) Take 1 tablet by mouth daily    Historical Provider, MD   oxybutynin (DITROPAN) 5 mg tablet TAKE 1 TABLET BY MOUTH  TWICE DAILY 4/25/22   Sonny Varela MD     I have reviewed home medications with patient personally  Allergies: Allergies   Allergen Reactions    Penicillins Other (See Comments)     RASH, hives; valentín PIP 5/22/01 jgo    Sulfamethoxazole-Trimethoprim Other (See Comments)     developed rash       Social History:  Marital Status: /Civil Union   Occupation: none  Patient Pre-hospital Living Situation: Home, With spouse  Patient Pre-hospital Level of Mobility: walks  Patient Pre-hospital Diet Restrictions: none  Substance Use History:   Social History     Substance and Sexual Activity   Alcohol Use Not Currently    Comment: Rarely      Social History     Tobacco Use   Smoking Status Former Smoker    Packs/day: 1 00    Years: 20 00    Pack years: 20 00    Types: Cigarettes   Smokeless Tobacco Never Used     Social History     Substance and Sexual Activity   Drug Use Never       Family History:  Family History   Problem Relation Age of Onset    Diabetes Mother     Hypertension Mother     Hyperlipidemia Mother     Diabetes Father     Hypertension Father     Hyperlipidemia Father     Hyperlipidemia Maternal Grandmother        Physical Exam:     Vitals:   Blood Pressure: 149/76 (10/04/22 2258)  Pulse: (!) 54 (10/04/22 2258)  Temperature: 97 9 °F (36 6 °C) (10/04/22 2258)  Temp Source: Temporal (10/04/22 2258)  Respirations: 19 (10/04/22 2258)  Height: 5' (152 4 cm) (10/04/22 2258)  Weight - Scale: 98 7 kg (217 lb 9 5 oz) (10/04/22 2258)  SpO2: 98 % (10/04/22 2258)    Physical Exam  Constitutional:       General: He is not in acute distress  Appearance: Normal appearance  He is not ill-appearing, toxic-appearing or diaphoretic     HENT:      Head: Normocephalic and atraumatic  Eyes:      Pupils: Pupils are equal, round, and reactive to light  Cardiovascular:      Rate and Rhythm: Regular rhythm  Bradycardia present  Pulses: Normal pulses  Pulmonary:      Effort: Pulmonary effort is normal  No respiratory distress  Breath sounds: Normal breath sounds  No wheezing, rhonchi or rales  Abdominal:      General: Bowel sounds are normal  There is no distension  Palpations: Abdomen is soft  Tenderness: There is no abdominal tenderness  There is no guarding  Musculoskeletal:      Cervical back: Normal range of motion  Right lower leg: No edema  Left lower leg: No edema  Comments: Left foot drop, left hand contracted- chronic  Skin:     General: Skin is warm and dry  Neurological:      General: No focal deficit present  Mental Status: He is alert and oriented to person, place, and time  Comments: No nystagmus  Non focal exam  Speech clear  No limb ataxia- limited left hand finger to nose due to contracture  Additional Data:     Lab Results:  Results from last 7 days   Lab Units 10/04/22  1950   WBC Thousand/uL 6 50   HEMOGLOBIN g/dL 14 2   HEMATOCRIT % 40 0   PLATELETS Thousands/uL 147*   NEUTROS PCT % 63   LYMPHS PCT % 28   MONOS PCT % 6   EOS PCT % 3     Results from last 7 days   Lab Units 10/04/22  1950   SODIUM mmol/L 142   POTASSIUM mmol/L 4 1   CHLORIDE mmol/L 104   CO2 mmol/L 33*   BUN mg/dL 42*   CREATININE mg/dL 1 14   ANION GAP mmol/L 5   CALCIUM mg/dL 8 9   ALBUMIN g/dL 3 9   TOTAL BILIRUBIN mg/dL 0 99   ALK PHOS U/L 67   ALT U/L 28   AST U/L 9   GLUCOSE RANDOM mg/dL 101                       Imaging: Reviewed radiology reports from this admission including: CT head  CT head without contrast   Final Result by Falguni Crawley MD (10/04 2107)      No acute intracranial abnormality                    Workstation performed: [id number]             EKG and Other Studies Reviewed on Admission:   · EKG: Sinus Bradycardia  HR 56     ** Please Note: This note has been constructed using a voice recognition system   **

## 2022-10-06 ENCOUNTER — TRANSITIONAL CARE MANAGEMENT (OUTPATIENT)
Dept: FAMILY MEDICINE CLINIC | Facility: CLINIC | Age: 72
End: 2022-10-06

## 2022-10-06 ENCOUNTER — TELEPHONE (OUTPATIENT)
Dept: FAMILY MEDICINE CLINIC | Facility: CLINIC | Age: 72
End: 2022-10-06

## 2022-10-06 NOTE — TELEPHONE ENCOUNTER
Patient returned my phone call to schedule a TCM  Per the patient his dizziness is still severe and is not able to even sit in a car because the motion worsens it  Not sure if he could manage a virtual visit  The cefdinir and meclizine he was prescribed hasn't helped yet   Please advise

## 2022-10-07 LAB
BACTERIA UR CULT: ABNORMAL
BACTERIA UR CULT: ABNORMAL

## 2022-10-12 ENCOUNTER — TELEPHONE (OUTPATIENT)
Dept: FAMILY MEDICINE CLINIC | Facility: CLINIC | Age: 72
End: 2022-10-12

## 2022-10-12 ENCOUNTER — OFFICE VISIT (OUTPATIENT)
Dept: FAMILY MEDICINE CLINIC | Facility: CLINIC | Age: 72
End: 2022-10-12
Payer: MEDICARE

## 2022-10-12 VITALS
WEIGHT: 217.6 LBS | BODY MASS INDEX: 29.47 KG/M2 | SYSTOLIC BLOOD PRESSURE: 130 MMHG | HEIGHT: 72 IN | TEMPERATURE: 97.8 F | DIASTOLIC BLOOD PRESSURE: 90 MMHG

## 2022-10-12 DIAGNOSIS — M15.3 POST-TRAUMATIC OSTEOARTHRITIS OF MULTIPLE JOINTS: ICD-10-CM

## 2022-10-12 DIAGNOSIS — I10 ESSENTIAL HYPERTENSION: Chronic | ICD-10-CM

## 2022-10-12 DIAGNOSIS — F41.1 GENERALIZED ANXIETY DISORDER: Chronic | ICD-10-CM

## 2022-10-12 DIAGNOSIS — N30.00 ACUTE CYSTITIS WITHOUT HEMATURIA: ICD-10-CM

## 2022-10-12 DIAGNOSIS — G62.9 NEUROPATHY: Chronic | ICD-10-CM

## 2022-10-12 DIAGNOSIS — R42 VERTIGO: Primary | ICD-10-CM

## 2022-10-12 PROCEDURE — 99496 TRANSJ CARE MGMT HIGH F2F 7D: CPT | Performed by: FAMILY MEDICINE

## 2022-10-12 RX ORDER — MELOXICAM 15 MG/1
15 TABLET ORAL DAILY
Qty: 90 TABLET | Refills: 3 | Status: SHIPPED | OUTPATIENT
Start: 2022-10-12 | End: 2023-10-12

## 2022-10-12 NOTE — ASSESSMENT & PLAN NOTE
Reviewed hospital reports and reconciled medication  Discussed overall problem  This persist and I do feel it probably relates to the middle ear  May try taking trans scope patch every 3 days behind the ear as this may give more even support for the dizziness  Will set up with ENT for follow-up    Watch up down head motion

## 2022-10-12 NOTE — PATIENT INSTRUCTIONS
Discussed the problem with the vertigo which continues to be a problem and does not look like it is improved much  Should continue using the saline nasal rinse and the nasal steroid as does seem to have some trouble with fluid behind the ear drum  Is going to try using the trans scope ear patch for more consistent control of the dizziness and does have some of these at home  Will set up with ENT for follow-up    Be very careful with up and down head motion

## 2022-10-12 NOTE — PROGRESS NOTES
TCM Call     Date and time call was made  8/10/2020  9:54 AM    Hospital care reviewed  Records reviewed    Patient was hospitialized at  Other (comment)    Comment  [facility]    Date of Admission  08/08/20    Date of discharge  08/09/20    Diagnosis  Syncope and collapse    Disposition  Home    Were the patients medications reviewed and updated  No    Current Symptoms  None      TCM Call     Post hospital issues  None    Should patient be enrolled in anticoag monitoring? No    Scheduled for follow up?   Yes    Did you obtain your prescribed medications  Yes    Do you need help managing your prescriptions or medications  No    Is transportation to your appointment needed  No    I have advised the patient to call PCP with any new or worsening symptoms  Olman Ohms, CMA    Living Arrangements  Spouse or Significiant other    Support System  Spouse    The type of support provided  Emotional; Financial; Physical    Do you have social support  Yes, as much as I need    Are you recieving any outpatient services  No    Are you recieving home care services  No    Are you using any community resources  No    Current waiver services  No    Have you fallen in the last 12 months  No    Interperter language line needed  No    Counseling  Patient

## 2022-10-12 NOTE — PROGRESS NOTES
Assessment & Plan     1  Vertigo  Assessment & Plan:  Reviewed hospital reports and reconciled medication  Discussed overall problem  This persist and I do feel it probably relates to the middle ear  May try taking trans scope patch every 3 days behind the ear as this may give more even support for the dizziness  Will set up with ENT for follow-up  Watch up down head motion    Orders:  -     Ambulatory Referral to Otolaryngology; Future; Expected date: 10/19/2022    2  Post-traumatic osteoarthritis of multiple joints  -     meloxicam (MOBIC) 15 mg tablet; Take 1 tablet (15 mg total) by mouth daily    3  Essential hypertension  Assessment & Plan:  Overall has been stable  Continue current medication  I do not feel this relates to problem      4  Neuropathy  Assessment & Plan:  Stable, continue current regimen      5  Generalized anxiety disorder  Assessment & Plan:  Seems to be doing well, continue current regimen      6  Acute cystitis without hematuria  Assessment & Plan:  He feels the symptoms he had with his have resolved  Would follow at this time         Subjective     Transitional Care Management Review:   Amina Rao is a 80 y o  male here for TCM follow up  During the TCM phone call patient stated:  TCM Call     Date and time call was made  8/10/2020  9:54 AM    Hospital care reviewed  Records reviewed    Patient was hospitialized at  Other (comment)    Comment  [facility]    Date of Admission  08/08/20    Date of discharge  08/09/20    Diagnosis  Syncope and collapse    Disposition  Home    Were the patients medications reviewed and updated  No    Current Symptoms  None      TCM Call     Post hospital issues  None    Should patient be enrolled in anticoag monitoring? No    Scheduled for follow up?   Yes    Did you obtain your prescribed medications  Yes    Do you need help managing your prescriptions or medications  No    Is transportation to your appointment needed  No    I have advised the patient to call PCP with any new or worsening symptoms  Alta Yadav CMA    Living Arrangements  Spouse or Significiant other    Support System  Spouse    The type of support provided  Emotional; Financial; Physical    Do you have social support  Yes, as much as I need    Are you recieving any outpatient services  No    Are you recieving home care services  No    Are you using any community resources  No    Current waiver services  No    Have you fallen in the last 12 months  No    Interperter language line needed  No    Counseling  Patient        Patient has history of spinal cord injury with central cord syndrome and does self catheterization to pass urine  Also has history of hypertension diabetes mellitus adult, allergic rhinitis lower leg neuropathy, migraines, and stress disorder  Was having severe problems with dizziness and was hospitalized for this  Was also found to have UTI and has finished the antibiotic for this  Continues to have the dizziness particularly if missing a dose of the meclizine  Still has popping sensation in the left ear and has been using steroid nasal spray    Review of Systems   Constitutional: Negative for fever  HENT: Negative for congestion, ear pain (But does have crackling sensation in the left ear) and trouble swallowing  Eyes: Negative for visual disturbance  Respiratory: Negative for choking and shortness of breath  Cardiovascular: Negative for chest pain, palpitations and leg swelling  Gastrointestinal: Negative for abdominal pain, anal bleeding and nausea  Genitourinary: Positive for difficulty urinating ( does self catheterization  Does not feel has any more urinary symptoms)  Allergic/Immunologic: Positive for environmental allergies  Neurological: Positive for dizziness  Negative for weakness  Psychiatric/Behavioral: Negative for sleep disturbance         Objective     /90 (BP Location: Left arm, Patient Position: Sitting, Cuff Size: Standard)   Temp 97 8 °F (36 6 °C)   Ht 6' (1 829 m)   Wt 98 7 kg (217 lb 9 6 oz)   BMI 29 51 kg/m²      Physical Exam  Vitals and nursing note reviewed  Constitutional:       Appearance: Normal appearance  HENT:      Head: Normocephalic  Mouth/Throat:      Mouth: Mucous membranes are moist    Eyes:      Extraocular Movements: Extraocular movements intact  Conjunctiva/sclera: Conjunctivae normal       Pupils: Pupils are equal, round, and reactive to light  Neck:      Vascular: No carotid bruit  Cardiovascular:      Rate and Rhythm: Normal rate and regular rhythm  Pulses: Normal pulses  Heart sounds: Normal heart sounds  No murmur (Rate is 78) heard  Pulmonary:      Effort: Pulmonary effort is normal       Breath sounds: Normal breath sounds  Abdominal:      General: Abdomen is flat  There is no distension  Palpations: Abdomen is soft  There is no mass  Tenderness: There is no abdominal tenderness  Musculoskeletal:         General: No swelling  Cervical back: Normal range of motion and neck supple  No tenderness  No muscular tenderness  Right lower leg: No edema  Left lower leg: No edema  Lymphadenopathy:      Cervical: No cervical adenopathy  Skin:     General: Skin is warm and dry  Findings: No rash  Neurological:      Mental Status: He is alert and oriented to person, place, and time  Cranial Nerves: No cranial nerve deficit  Motor: No weakness  Coordination: Romberg sign negative  Coordination normal       Gait: Gait abnormal       Deep Tendon Reflexes: Reflexes abnormal ( reflexes 3 to 4+)     Psychiatric:         Mood and Affect: Mood normal        Tere Hayden MD

## 2022-10-14 NOTE — ASSESSMENT & PLAN NOTE
I feel this relates to the middle ear in does appear to be having some middle ear effusion  Should use saline nasal rinse at bedtime and in the morning and afterwards use 1 spray in each nostril of Nasacort  May use meclizine at bedtime 12 5 mg and p r n  During the day    This should gradually improve over the next few weeks The patient is a 64 year old male with PMH of HTN and ESRD on dialysis on MWF presented to ED with c/o sob as he had missed his last dialysis on Wednesday due to some transportation issues. His last dialysis was on last Monday.  He has some dry cough with sob. Otherwise, he denies any fever, chills, chest pain or heaviness, vomiting, confusion, abdominal pain, diarrhea or dysuria or leg edema.   Significant lab: K 6.3, Na 131, Mg 3,8, PO4 6.2, Cr 22.90, BUN 92, Hb 10.2.  Vitals stable.  EKG negative any acute ST-T changes.    Nephrology consult will be called by ED provider.

## 2022-10-19 DIAGNOSIS — R42 VERTIGO: ICD-10-CM

## 2022-10-19 RX ORDER — MECLIZINE HYDROCHLORIDE 25 MG/1
25 TABLET ORAL EVERY 8 HOURS PRN
Qty: 30 TABLET | Refills: 5 | Status: SHIPPED | OUTPATIENT
Start: 2022-10-19

## 2022-11-14 ENCOUNTER — TELEMEDICINE (OUTPATIENT)
Dept: FAMILY MEDICINE CLINIC | Facility: CLINIC | Age: 72
End: 2022-11-14

## 2022-11-14 VITALS — WEIGHT: 217 LBS | TEMPERATURE: 100 F | BODY MASS INDEX: 29.39 KG/M2 | HEIGHT: 72 IN

## 2022-11-14 DIAGNOSIS — U07.1 COVID-19: Primary | ICD-10-CM

## 2022-11-14 NOTE — PROGRESS NOTES
COVID-19 Outpatient Progress Note    Assessment/Plan:    Problem List Items Addressed This Visit        Other    [facility]12 - Primary     Discussed overall problem  Is fully vaccinated and has only mild symptoms  Should use the saline nasal rinse at bedtime and in the morning and afterwards 1 spray in each nostril of Nasacort  May use Tylenol and continue the Mucinex  Push fluids  If develops problems with shortness of breath or discolored mucus should call              Disposition:     Discussed overall problem  Patient is fully vaccinated and has mild symptoms at this time  May use the saline nasal rinse at bedtime and in the morning and afterwards use 1 spray in each nostril of the nasal steroid  May continue the Mucinex and Tylenol and push fluids  Will need to quarantine through till Thursday but if feeling better at that time may resume normal activity but will need to wear mask for the next week  It develops problems with shortness of breath should call or if develops discolored mucus    I have spent 10 minutes directly with the patient  Greater than 50% of this time was spent in counseling/coordination of care regarding: prognosis, instructions for management, importance of treatment compliance and impressions  Encounter provider: Nereida Darling MD     Provider located at: [facility]IMARY [facility]  [address] [place], [place] [address]y [address]  [phone]     Recent Visits  No visits were found meeting these conditions  Showing recent visits within past 7 days and meeting all other requirements  Today's Visits  Date Type Provider Dept   11/14/22 Telemedicine MD [facility]   Showing today's visits and meeting all other requirements  Future Appointments  No visits were found meeting these conditions    Showing future appointments within next 150 days and meeting all other requirements     This virtual check-in was done via [address] and patient was informed that this is a secure, HIPAA-compliant platform  He agrees to proceed  Patient agrees to participate in a virtual check in via telephone or video visit instead of presenting to the office to address urgent/immediate medical needs  Patient is aware this is a billable service  He acknowledged consent and understanding of privacy and security of the video platform  The patient has agreed to participate and understands they can discontinue the visit at any time  After connecting through [facility], the patient was identified by name and date of birth  Xin Caban was informed that this was a telemedicine visit and that the exam was being conducted confidentially over secure lines  My office door was closed  No one else was in the room  Xin Caban acknowledged consent and understanding of privacy and security of the telemedicine visit  I informed the patient that I have reviewed his record in Epic and presented the opportunity for him to ask any questions regarding the visit today  The patient agreed to participate  Verification of patient location:  Patient is located in the following state in which I hold an active license: [place]    Subjective:   Xin Caban is a 71 y o  male who is concerned about COVID-19  Patient's symptoms include fever, nasal congestion, rhinorrhea, anosmia, loss of taste, cough and chest tightness  Patient denies chills, fatigue, malaise, sore throat, shortness of breath, abdominal pain, nausea, vomiting, diarrhea, myalgias and headaches       - Date of symptom onset: 11/11/2022      COVID-19 vaccination status: Fully vaccinated with booster    Exposure:   Contact with a person who is under investigation (PUI) for or who is positive for COVID-19 within the last 14 days?: Yes    Hospitalized recently for fever and/or lower respiratory symptoms?: No      Currently a healthcare worker that is involved in direct patient care?: No      Works in a special setting where the risk of COVID-19 transmission may be high? (this may include long-term care, correctional and snf facilities; homeless shelters; assisted-living facilities and group homes ): No      Resident in a special setting where the risk of COVID-19 transmission may be high? (this may include long-term care, correctional and snf facilities; homeless shelters; assisted-living facilities and group homes ): No      Patient started with cold type symptoms on Friday  Tested himself today with direct antigen test at home and came up positive for COVID  Has slight congestion and occasional cough although mucus is clear does get a little tightness when having the cough and does have some lost of taste or smell  Has been using Mucinex and nasal steroid spray  Does have mild fever    No results found for: Gene Spicer, SARSCORONAVI, CORONAVIRUSR, [facility], [facility]    Review of Systems   Constitutional: Positive for fever  Negative for chills and fatigue  HENT: Positive for congestion and rhinorrhea  Negative for sore throat  Respiratory: Positive for cough and chest tightness  Negative for shortness of breath  Gastrointestinal: Negative for abdominal pain, diarrhea, nausea and vomiting  Musculoskeletal: Negative for myalgias  Neurological: Negative for headaches       Current Outpatient Medications on File Prior to Visit   Medication Sig   • citalopram (CeleXA) 20 mg tablet TAKE 1 TABLET BY MOUTH  DAILY   • glucosamine-chondroitin 500-400 MG tablet Take 1 tablet by mouth daily   • lisinopril (ZESTRIL) 20 mg tablet TAKE 1 TABLET BY MOUTH  DAILY   • LORazepam (ATIVAN) 0 5 mg tablet TAKE 1 TABLET BY MOUTH  DAILY AS NEEDED FOR ANXIETY   • meclizine (ANTIVERT) 25 mg tablet Take 1 tablet (25 mg total) by mouth every 8 (eight) hours as needed for dizziness   • meloxicam (MOBIC) 15 mg tablet Take 1 tablet (15 mg total) by mouth daily   • Multiple Vitamins-Minerals (MULTIVITAMIN ADULTS PO) Take 1 tablet by mouth daily   • oxybutynin (DITROPAN) 5 mg tablet TAKE 1 TABLET BY MOUTH  TWICE DAILY   • acetaminophen (TYLENOL) 500 mg tablet Take 1,000 mg by mouth 2 (two) times a day (Patient not taking: No sig reported)   • gabapentin (Neurontin) 300 mg capsule Take 1 capsule (300 mg total) by mouth 3 (three) times a day   • ketoconazole (NIZORAL) 2 % cream Apply topically daily (Patient not taking: No sig reported)   • [DISCONTINUED] cyclobenzaprine (FLEXERIL) 10 mg tablet Take 1 tablet (10 mg total) by mouth 3 (three) times a day as needed for muscle spasms (Patient not taking: Reported on 10/5/2022)       Objective:    Temp 100 °F (37 8 °C)   Ht 6' (1 829 m)   Wt 98 4 kg (217 lb)   BMI 29 43 kg/m²      Physical Exam  Vitals reviewed  Constitutional:       Appearance: Normal appearance  He is not ill-appearing  Eyes:      Conjunctiva/sclera: Conjunctivae normal    Pulmonary:      Effort: Pulmonary effort is normal  No respiratory distress (Respirations 10)  Skin:     Findings: No rash  Neurological:      Mental Status: He is alert     Psychiatric:         Mood and Affect: Mood normal        Guy Alatorre MD

## 2022-11-14 NOTE — ASSESSMENT & PLAN NOTE
Discussed overall problem  Is fully vaccinated and has only mild symptoms  Should use the saline nasal rinse at bedtime and in the morning and afterwards 1 spray in each nostril of Nasacort  May use Tylenol and continue the Mucinex  Push fluids    If develops problems with shortness of breath or discolored mucus should call

## 2022-11-14 NOTE — PATIENT INSTRUCTIONS
Discussed overall problem  Is fully vaccinated and only has mild symptoms  Should use saline nasal rinse at bedtime and in the morning and afterwards use 1 spray in each nostril of the nasal steroid  May continue the Mucinex and use Tylenol and should push fluids  Will need to quarantine till Thursday but if feeling better at that time may resume normal activity but should wear mask for the next week    It develops problems with shortness of breath or discolored mucus should call

## 2022-12-11 PROBLEM — N30.00 ACUTE CYSTITIS WITHOUT HEMATURIA: Status: RESOLVED | Noted: 2022-10-05 | Resolved: 2022-12-11

## 2022-12-20 ENCOUNTER — OFFICE VISIT (OUTPATIENT)
Dept: FAMILY MEDICINE CLINIC | Facility: CLINIC | Age: 72
End: 2022-12-20

## 2022-12-20 VITALS
SYSTOLIC BLOOD PRESSURE: 126 MMHG | WEIGHT: 216 LBS | HEIGHT: 72 IN | DIASTOLIC BLOOD PRESSURE: 76 MMHG | BODY MASS INDEX: 29.26 KG/M2

## 2022-12-20 DIAGNOSIS — S14.129S CENTRAL CORD SYNDROME, SEQUELA (HCC): Chronic | ICD-10-CM

## 2022-12-20 DIAGNOSIS — F41.1 GENERALIZED ANXIETY DISORDER: Chronic | ICD-10-CM

## 2022-12-20 DIAGNOSIS — J30.1 SEASONAL ALLERGIC RHINITIS DUE TO POLLEN: Chronic | ICD-10-CM

## 2022-12-20 DIAGNOSIS — G62.9 NEUROPATHY: Chronic | ICD-10-CM

## 2022-12-20 DIAGNOSIS — H60.8X3 CHRONIC ECZEMATOUS OTITIS EXTERNA OF BOTH EARS: ICD-10-CM

## 2022-12-20 DIAGNOSIS — R73.09 ABNORMAL GLUCOSE: Chronic | ICD-10-CM

## 2022-12-20 DIAGNOSIS — I10 ESSENTIAL HYPERTENSION: Primary | Chronic | ICD-10-CM

## 2022-12-20 DIAGNOSIS — Z00.00 MEDICARE ANNUAL WELLNESS VISIT, SUBSEQUENT: ICD-10-CM

## 2022-12-20 RX ORDER — HYDROCORTISONE AND ACETIC ACID 20.75; 10.375 MG/ML; MG/ML
3 SOLUTION AURICULAR (OTIC) EVERY 6 HOURS SCHEDULED
Qty: 10 ML | Refills: 1 | Status: SHIPPED | OUTPATIENT
Start: 2022-12-20

## 2022-12-20 NOTE — PATIENT INSTRUCTIONS
Medicare Preventive Visit Patient Instructions  Thank you for completing your Welcome to Medicare Visit or Medicare Annual Wellness Visit today  Your next wellness visit will be due in one year (12/21/2023)  The screening/preventive services that you may require over the next 5-10 years are detailed below  Some tests may not apply to you based off risk factors and/or age  Screening tests ordered at today's visit but not completed yet may show as past due  Also, please note that scanned in results may not display below  Preventive Screenings:  Service Recommendations Previous Testing/Comments   Colorectal Cancer Screening  Colonoscopy    Fecal Occult Blood Test (FOBT)/Fecal Immunochemical Test (FIT)  Fecal DNA/Cologuard Test  Flexible Sigmoidoscopy Age: 39-70 years old   Colonoscopy: every 10 years (May be performed more frequently if at higher risk)  OR  FOBT/FIT: every 1 year  OR  Cologuard: every 3 years  OR  Sigmoidoscopy: every 5 years  Screening may be recommended earlier than age 39 if at higher risk for colorectal cancer  Also, an individualized decision between you and your healthcare provider will decide whether screening between the ages of 74-80 would be appropriate   Colonoscopy: 09/16/2014  FOBT/FIT: Not on file  Cologuard: Not on file  Sigmoidoscopy: Not on file    Screening Current     Prostate Cancer Screening Individualized decision between patient and health care provider in men between ages of 53-78   Medicare will cover every 12 months beginning on the day after your 50th birthday PSA: 0 7 ng/mL     Screening Current     Hepatitis C Screening Once for adults born between 1945 and 1965  More frequently in patients at high risk for Hepatitis C Hep C Antibody: Not on file    Risks and Benefits Discussed   Diabetes Screening 1-2 times per year if you're at risk for diabetes or have pre-diabetes Fasting glucose: 141 mg/dL (8/8/2022)  A1C: 5 4 (3/16/2022)  Screening Not Indicated  History Diabetes Cholesterol Screening Once every 5 years if you don't have a lipid disorder  May order more often based on risk factors  Lipid panel: 03/10/2021  Screening Current      Other Preventive Screenings Covered by Medicare:  Abdominal Aortic Aneurysm (AAA) Screening: covered once if your at risk  You're considered to be at risk if you have a family history of AAA or a male between the age of 73-68 who smoking at least 100 cigarettes in your lifetime  Lung Cancer Screening: covers low dose CT scan once per year if you meet all of the following conditions: (1) Age 50-69; (2) No signs or symptoms of lung cancer; (3) Current smoker or have quit smoking within the last 15 years; (4) You have a tobacco smoking history of at least 20 pack years (packs per day x number of years you smoked); (5) You get a written order from a healthcare provider  Glaucoma Screening: covered annually if you're considered high risk: (1) You have diabetes OR (2) Family history of glaucoma OR (3)  aged 48 and older OR (3)  American aged 72 and older  Osteoporosis Screening: covered every 2 years if you meet one of the following conditions: (1) Have a vertebral abnormality; (2) On glucocorticoid therapy for more than 3 months; (3) Have primary hyperparathyroidism; (4) On osteoporosis medications and need to assess response to drug therapy  HIV Screening: covered annually if you're between the age of 12-76  Also covered annually if you are younger than 13 and older than 72 with risk factors for HIV infection  For pregnant patients, it is covered up to 3 times per pregnancy      Immunizations:  Immunization Recommendations   Influenza Vaccine Annual influenza vaccination during flu season is recommended for all persons aged >= 6 months who do not have contraindications   Pneumococcal Vaccine   * Pneumococcal conjugate vaccine = PCV13 (Prevnar 13), PCV15 (Vaxneuvance), PCV20 (Prevnar 20)  * Pneumococcal polysaccharide vaccine = PPSV23 (Pneumovax) Adults 2364 years old: 1-3 doses may be recommended based on certain risk factors  Adults 72 years old: 1-2 doses may be recommended based off what pneumonia vaccine you previously received   Hepatitis B Vaccine 3 dose series if at intermediate or high risk (ex: diabetes, end stage renal disease, liver disease)   Tetanus (Td) Vaccine - COST NOT COVERED BY MEDICARE PART B Following completion of primary series, a booster dose should be given every 10 years to maintain immunity against tetanus  Td may also be given as tetanus wound prophylaxis  Tdap Vaccine - COST NOT COVERED BY MEDICARE PART B Recommended at least once for all adults  For pregnant patients, recommended with each pregnancy  Shingles Vaccine (Shingrix) - COST NOT COVERED BY MEDICARE PART B  2 shot series recommended in those aged 48 and above     Health Maintenance Due:      Topic Date Due    Hepatitis C Screening  Never done    Colorectal Cancer Screening  09/16/2024     Immunizations Due:      Topic Date Due    Hepatitis B Vaccine (1 of 3 - 3-dose series) Never done    Pneumococcal Vaccine: 65+ Years (2 - PCV) 08/20/2015     Advance Directives   What are advance directives? Advance directives are legal documents that state your wishes and plans for medical care  These plans are made ahead of time in case you lose your ability to make decisions for yourself  Advance directives can apply to any medical decision, such as the treatments you want, and if you want to donate organs  What are the types of advance directives? There are many types of advance directives, and each state has rules about how to use them  You may choose a combination of any of the following:  Living will: This is a written record of the treatment you want  You can also choose which treatments you do not want, which to limit, and which to stop at a certain time  This includes surgery, medicine, IV fluid, and tube feedings     Durable power of  for healthcare [facility]): This is a written record that states who you want to make healthcare choices for you when you are unable to make them for yourself  This person, called a proxy, is usually a family member or a friend  You may choose more than 1 proxy  Do not resuscitate (DNR) order:  A DNR order is used in case your heart stops beating or you stop breathing  It is a request not to have certain forms of treatment, such as CPR  A DNR order may be included in other types of advance directives  Medical directive: This covers the care that you want if you are in a coma, near death, or unable to make decisions for yourself  You can list the treatments you want for each condition  Treatment may include pain medicine, surgery, blood transfusions, dialysis, IV or tube feedings, and a ventilator (breathing machine)  Values history: This document has questions about your views, beliefs, and how you feel and think about life  This information can help others choose the care that you would choose  Why are advance directives important? An advance directive helps you control your care  Although spoken wishes may be used, it is better to have your wishes written down  Spoken wishes can be misunderstood, or not followed  Treatments may be given even if you do not want them  An advance directive may make it easier for your family to make difficult choices about your care  Fall Prevention    Fall prevention  includes ways to make your home and other areas safer  It also includes ways you can move more carefully to prevent a fall  Health conditions that cause changes in your blood pressure, vision, or muscle strength and coordination may increase your risk for falls  Medicines may also increase your risk for falls if they make you dizzy, weak, or sleepy  Fall prevention tips:   Stand or sit up slowly  Use assistive devices as directed  Wear shoes that fit well and have soles that   Wear a personal alarm  Stay active  Manage your medical conditions  Home Safety Tips:  Add items to prevent falls in the bathroom  Keep paths clear  Install bright lights in your home  Keep items you use often on shelves within reach  [place] or place reflective tape on the edges of your stairs  Weight Management   Why it is important to manage your weight:  Being overweight increases your risk of health conditions such as heart disease, high blood pressure, type 2 diabetes, and certain types of cancer  It can also increase your risk for osteoarthritis, sleep apnea, and other respiratory problems  Aim for a slow, steady weight loss  Even a small amount of weight loss can lower your risk of health problems  How to lose weight safely:  A safe and healthy way to lose weight is to eat fewer calories and get regular exercise  You can lose up about 1 pound a week by decreasing the number of calories you eat by 500 calories each day  Healthy meal plan for weight management:  A healthy meal plan includes a variety of foods, contains fewer calories, and helps you stay healthy  A healthy meal plan includes the following:  Eat whole-grain foods more often  A healthy meal plan should contain fiber  Fiber is the part of grains, fruits, and vegetables that is not broken down by your body  Whole-grain foods are healthy and provide extra fiber in your diet  Some examples of whole-grain foods are whole-wheat breads and pastas, oatmeal, brown rice, and bulgur  Eat a variety of vegetables every day  Include dark, leafy greens such as spinach, kale, dexter greens, and mustard greens  Eat yellow and orange vegetables such as carrots, sweet potatoes, and winter squash  Eat a variety of fruits every day  Choose fresh or canned fruit (canned in its own juice or light syrup) instead of juice  Fruit juice has very little or no fiber  Eat low-fat dairy foods  Drink fat-free (skim) milk or 1% milk   Eat fat-free yogurt and low-fat cottage cheese  Try low-fat cheeses such as mozzarella and other reduced-fat cheeses  Choose meat and other protein foods that are low in fat  Choose beans or other legumes such as split peas or lentils  Choose fish, skinless poultry (chicken or turkey), or lean cuts of red meat (beef or pork)  Before you cook meat or poultry, cut off any visible fat  Use less fat and oil  Try baking foods instead of frying them  Add less fat, such as margarine, sour cream, regular salad dressing and mayonnaise to foods  Eat fewer high-fat foods  Some examples of high-fat foods include french fries, doughnuts, ice cream, and cakes  Eat fewer sweets  Limit foods and drinks that are high in sugar  This includes candy, cookies, regular soda, and sweetened drinks  Exercise:  Exercise at least 30 minutes per day on most days of the week  Some examples of exercise include walking, biking, dancing, and swimming  You can also fit in more physical activity by taking the stairs instead of the elevator or parking farther away from stores  Ask your healthcare provider about the best exercise plan for you  © Copyright [address] 2018 Information is for End User's use only and may not be sold, redistributed or otherwise used for commercial purposes  All illustrations and images included in CareNotes® are the copyrighted property of A D A M , Inc  or Sanna Lange  Overall patient is functioning well  Has had flu shot  Did get COVID booster in May and may consider getting a new booster a year after    Continue current meds

## 2022-12-20 NOTE — PROGRESS NOTES
Assessment and Plan:     Problem List Items Addressed This Visit        Respiratory    Seasonal allergic rhinitis due to pollen (Chronic)     Doing well, continue as needed over-the-counter meds            Cardiovascular and Mediastinum    Essential hypertension - Primary (Chronic)     Overall stable, continue current regimen            Nervous and Auditory    Central cord syndrome ([address] ) (Chronic)     Functions well and does his self catheterizations  Continue current meds and follow-up         Neuropathy (Chronic)     Seems to be doing well, continue current regimen            Other    Abnormal glucose (Chronic)     Doing well, continue to watch sweets and starch in diet         Generalized anxiety disorder (Chronic)     I feel doing well, continue current regimen        Other Visit Diagnoses     Medicare annual wellness visit, subsequent        Chronic eczematous otitis externa of both ears        Relevant Medications    hydrocortisone-acetic acid (VOSOL-HC) otic solution          Depression Screening and Follow-up Plan: Patient was screened for depression during today's encounter  They screened negative with a PHQ-2 score of 0  Preventive health issues were discussed with patient, and age appropriate screening tests were ordered as noted in patient's After Visit Summary  Personalized health advice and appropriate referrals for health education or preventive services given if needed, as noted in patient's After Visit Summary  History of Present Illness:     Patient presents for a Medicare Wellness Visit    Patient has history of spinal cord injury with central cord syndrome and does self catheterization to pass urine  Also has history of hypertension diabetes mellitus adult, allergic rhinitis lower leg neuropathy, migraines, and stress disorder    Was having severe problems with dizziness but this has resolved     Patient Care Team:  Helene Marin MD as PCP - General (Family Medicine) Review of Systems:     Review of Systems   Constitutional: Negative for activity change, appetite change, chills, fatigue, fever and unexpected weight change  HENT: Negative for congestion, dental problem, hearing loss, rhinorrhea and trouble swallowing  Eyes: Negative for visual disturbance  Respiratory: Negative for apnea, cough, chest tightness and shortness of breath  Cardiovascular: Negative for chest pain, palpitations and leg swelling  Gastrointestinal: Negative for abdominal distention, abdominal pain, constipation and diarrhea  Endocrine: Negative for polyuria  Genitourinary: Positive for difficulty urinating  Negative for enuresis  Musculoskeletal: Positive for arthralgias, back pain and gait problem  Negative for myalgias  Skin: Negative for rash  Allergic/Immunologic: Positive for environmental allergies  Neurological: Negative for dizziness, weakness, light-headedness, numbness and headaches  Hematological: Negative for adenopathy  Psychiatric/Behavioral: Negative for agitation and sleep disturbance          Problem List:     Patient Active Problem List   Diagnosis   • Essential hypertension   • Seasonal allergic rhinitis due to pollen   • Generalized anxiety disorder   • Central cord syndrome [address] [address] )   • Neuropathy   • Quadriplegia (HCC)   • Thrombocytopenia (HCC)   • Von Willebrand's disease   • Muscle pain, cervical   • Abnormal glucose   • Chronic fatigue   • Tinea cruris   • Vertigo   • Ambulatory dysfunction   • COVID-19      Past Medical and Surgical History:     Past Medical History:   Diagnosis Date   • Allergic NA   • Allergic rhinitis    • Anxiety 7/1998   • Arthritis    • C2 spinal cord injury [facility])    • Clotting disorder ([facility] ) 7/1998   • Depression    • Essential hypertension    • Hypertension 7/2018   • Self-catheterizes urinary bladder    • Shingles 7/2017   • Urinary tract infection 7/2000   • Von Willebrand disease     Pt requires DDAVP prior to "major" surgery Past Surgical History:   Procedure Laterality Date   • CHOLECYSTECTOMY     • FEEDING TUBE PLACEMENT     • GASTROSTOMY  7/2000      Family History:     Family History   Problem Relation Age of Onset   • Diabetes Mother    • Hypertension Mother    • Hyperlipidemia Mother    • Diabetes Father    • Hypertension Father    • Hyperlipidemia Father    • Hyperlipidemia Maternal Grandmother       Social History:     Social History     Socioeconomic History   • Marital status: /Civil Union     Spouse name: None   • Number of children: None   • Years of education: None   • Highest education level: None   Occupational History   • None   Tobacco Use   • Smoking status: Former     Packs/day: 1 00     Years: 20 00     Pack years: 20 00     Types: Cigarettes   • Smokeless tobacco: Never   Vaping Use   • Vaping Use: Never used   Substance and Sexual Activity   • Alcohol use: Not Currently     Comment: Rarely    • Drug use: Never   • Sexual activity: Not Currently   Other Topics Concern   • None   Social History Narrative   • None     Social Determinants of Health     Financial Resource Strain: Low Risk    • Difficulty of Paying Living Expenses: Not hard at all   Food Insecurity: Not on file   Transportation Needs: No Transportation Needs   • Lack of Transportation (Medical): No   • Lack of Transportation (Non-Medical):  No   Physical Activity: Not on file   Stress: Not on file   Social Connections: Not on file   Intimate Partner Violence: Not on file   Housing Stability: Not on file      Medications and Allergies:     Current Outpatient Medications   Medication Sig Dispense Refill   • citalopram (CeleXA) 20 mg tablet TAKE 1 TABLET BY MOUTH  DAILY 90 tablet 1   • hydrocortisone-acetic acid (VOSOL-HC) otic solution Administer 3 drops into both ears every 6 (six) hours As needed for itching in the ears 10 mL 1   • lisinopril (ZESTRIL) 20 mg tablet TAKE 1 TABLET BY MOUTH  DAILY 90 tablet 1   • LORazepam (ATIVAN) 0 5 mg tablet TAKE 1 TABLET BY MOUTH  DAILY AS NEEDED FOR ANXIETY 90 tablet 1   • meloxicam (MOBIC) 15 mg tablet Take 1 tablet (15 mg total) by mouth daily 90 tablet 3   • Multiple Vitamins-Minerals (MULTIVITAMIN ADULTS PO) Take 1 tablet by mouth daily     • oxybutynin (DITROPAN) 5 mg tablet TAKE 1 TABLET BY MOUTH  TWICE DAILY 180 tablet 1   • acetaminophen (TYLENOL) 500 mg tablet Take 1,000 mg by mouth 2 (two) times a day (Patient not taking: No sig reported)     • gabapentin (Neurontin) 300 mg capsule Take 1 capsule (300 mg total) by mouth 3 (three) times a day 270 capsule 3   • glucosamine-chondroitin 500-400 MG tablet Take 1 tablet by mouth daily     • ketoconazole (NIZORAL) 2 % cream Apply topically daily (Patient not taking: No sig reported) 15 g 1   • meclizine (ANTIVERT) 25 mg tablet Take 1 tablet (25 mg total) by mouth every 8 (eight) hours as needed for dizziness 30 tablet 5     No current facility-administered medications for this visit       Allergies   Allergen Reactions   • Penicillins Other (See Comments)     RASH, hives; valentín PIP 5/22/01 jgo   • Sulfamethoxazole-Trimethoprim Other (See Comments)     developed rash      Immunizations:     Immunization History   Administered Date(s) Administered   • COVID-19 PFIZER VACCINE 0 3 ML IM 03/24/2021, 12/08/2021   • COVID-19 Pfizer vac (Grover-sucrose, gray cap) 12 yr+ IM 05/18/2022   • COVID-19, unspecified 03/03/2021   • INFLUENZA 09/27/2001, 10/31/2002, 10/25/2006, 11/20/2007, 11/25/2008, 12/07/2009, 11/30/2010, 09/16/2011, 10/02/2013, 11/21/2014, 09/30/2015, 11/18/2016, 12/13/2017, 10/23/2018   • Influenza, high dose seasonal 0 7 mL 11/15/2019, 12/09/2020, 09/15/2021, 09/26/2022   • Pneumococcal Polysaccharide PPV23 08/20/2014   • Tdap 05/16/2014      Health Maintenance:         Topic Date Due   • Hepatitis C Screening  Never done   • Colorectal Cancer Screening  09/16/2024         Topic Date Due   • Hepatitis B Vaccine (1 of 3 - 3-dose series) Never done   • Pneumococcal Vaccine: 65+ Years (2 - PCV) 08/20/2015      Medicare Screening Tests and Risk Assessments:     Hiral Rollins is here for his Subsequent Wellness visit  Last Medicare Wellness visit information reviewed, patient interviewed and updates made to the record today  Health Risk Assessment:   Patient rates overall health as good  Patient feels that their physical health rating is same  Patient is satisfied with their life  Eyesight was rated as same  Hearing was rated as same  Patient feels that their emotional and mental health rating is same  Patients states they are never, rarely angry  Patient states they are sometimes unusually tired/fatigued  Pain experienced in the last 7 days has been some  Patient's pain rating has been 5/10  Patient states that he has experienced no weight loss or gain in last 6 months  Has is intermittent back and leg pain does follow-up for this    Depression Screening:   PHQ-2 Score: 0      Fall Risk Screening: In the past year, patient has experienced: history of falling in past year    Number of falls: 1  Injured during fall?: No    Feels unsteady when standing or walking?: No    Worried about falling?: No      Home Safety:  Patient does not have trouble with stairs inside or outside of their home  Patient has working smoke alarms and has working carbon monoxide detector  Home safety hazards include: none  Overall balance is good continue to push walking activity to maintain leg strength    Nutrition:   Current diet is Regular  Needs to watch sweets and starch in diet    Medications:   Patient is currently taking over-the-counter supplements  OTC medications include: Probiotic, vitamins, cranberry  Patient is able to manage medications  No issues    Activities of Daily Living (ADLs)/Instrumental Activities of Daily Living (IADLs):   Walk and transfer into and out of bed and chair?: Yes  Dress and groom yourself?: Yes    Bathe or shower yourself?: Yes    Feed yourself?  Yes  Do your laundry/housekeeping?: Yes  Manage your money, pay your bills and track your expenses?: Yes  Make your own meals?: Yes    Do your own shopping?: Yes    ADL comments: No issues    Durable Medical Equipment Suppliers  [employer]    Previous Hospitalizations:   Any hospitalizations or ED visits within the last 12 months?: Yes    How many hospitalizations have you had in the last year?: 1-2    Hospitalization Comments: Follows up with [facility] for physiatry and Urology    Advance Care Planning:   Living will: No    Durable POA for healthcare: Yes    Advanced directive: No    Advanced directive counseling given: Yes    Five wishes given: Yes    End of Life Decisions reviewed with patient: No      Comments: Discussed the importance of doing this and was given a form for 5 wishes    Cognitive Screening:   Provider or family/friend/caregiver concerned regarding cognition?: No    PREVENTIVE SCREENINGS      Cardiovascular Screening:    General: Screening Current      Diabetes Screening:     General: Screening Not Indicated and History Diabetes      Colorectal Cancer Screening:     General: Screening Current      Prostate Cancer Screening:    General: Screening Current      Osteoporosis Screening:    General: Screening Not Indicated      Abdominal Aortic Aneurysm (AAA) Screening:    Risk factors include: age between 73-67 yo and tobacco use        General: Screening Not Indicated      Lung Cancer Screening:     General: Screening Not Indicated      Hepatitis C Screening:    General: Risks and Benefits Discussed      Preventive Screening Comments: Continue follow-up for routine eye care and have reports forwarded to office  Continue follow-up with physiatry and Neurology    Screening, Brief Intervention, and Referral to Treatment (SBIRT)    Screening  Typical number of drinks in a day: 0  Typical number of drinks in a week: 0  Interpretation: Low risk drinking behavior      Single Item Drug Screening:  How often have you used an illegal drug (including marijuana) or a prescription medication for non-medical reasons in the past year? never    Single Item Drug Screen Score: 0  Interpretation: Negative screen for possible drug use disorder    Brief Intervention  Alcohol & drug use screenings were reviewed  No concerns regarding substance use disorder identified  No results found  Physical Exam:     /76 (BP Location: Left arm, Patient Position: Sitting, Cuff Size: Large)   Ht 6' (1 829 m)   Wt 98 kg (216 lb)   BMI 29 29 kg/m²     Physical Exam  Vitals and nursing note reviewed  Constitutional:       Appearance: Normal appearance  HENT:      Head: Normocephalic  Right Ear: Tympanic membrane, ear canal and external ear normal  Decreased hearing (25 dB hearing screen off at 4000 Hz and 2000 Hz  No difficulty with conversation) noted  Left Ear: Tympanic membrane, ear canal and external ear normal  Decreased hearing (25 dB hearing screen off except at 500 Hz) noted  Nose: Nose normal       Mouth/Throat:      Mouth: Mucous membranes are moist       Dentition: Normal dentition  Eyes:      Extraocular Movements: Extraocular movements intact  Conjunctiva/sclera: Conjunctivae normal       Pupils: Pupils are equal, round, and reactive to light  Neck:      Vascular: No carotid bruit  Cardiovascular:      Rate and Rhythm: Normal rate and regular rhythm  Pulses:           Dorsalis pedis pulses are 1+ on the right side and 1+ on the left side  Posterior tibial pulses are 1+ on the right side and 1+ on the left side  Heart sounds: Normal heart sounds  No murmur heard  Pulmonary:      Effort: Pulmonary effort is normal       Breath sounds: Normal breath sounds  Abdominal:      General: Abdomen is flat  There is no distension  Palpations: Abdomen is soft  There is no mass  Tenderness: There is no abdominal tenderness  Musculoskeletal:         General: No swelling  Cervical back: Normal range of motion and neck supple  No muscular tenderness  Right lower leg: No edema  Left lower leg: No edema  Right foot: No deformity  Left foot: No deformity  Feet:      Right foot:      Protective Sensation: 8 sites tested  8 sites sensed  Skin integrity: Skin integrity normal       Left foot:      Protective Sensation: 8 sites tested  8 sites sensed  Skin integrity: Skin integrity normal    Lymphadenopathy:      Cervical: No cervical adenopathy  Skin:     General: Skin is warm and dry  Findings: No rash  Neurological:      General: No focal deficit present  Mental Status: He is alert and oriented to person, place, and time  Cranial Nerves: No cranial nerve deficit  Sensory: No sensory deficit  Motor: No weakness  Coordination: Coordination abnormal       Gait: Gait abnormal       Deep Tendon Reflexes: Reflexes abnormal (Reflexes 4+)  Psychiatric:         Mood and Affect: Mood normal          Behavior: Behavior normal          Thought Content:  Thought content normal          Judgment: Judgment normal           Bro Wesley MD

## 2023-03-20 ENCOUNTER — OFFICE VISIT (OUTPATIENT)
Dept: FAMILY MEDICINE CLINIC | Facility: CLINIC | Age: 73
End: 2023-03-20

## 2023-03-20 VITALS
WEIGHT: 218 LBS | SYSTOLIC BLOOD PRESSURE: 130 MMHG | HEIGHT: 72 IN | OXYGEN SATURATION: 96 % | HEART RATE: 63 BPM | DIASTOLIC BLOOD PRESSURE: 86 MMHG | BODY MASS INDEX: 29.53 KG/M2

## 2023-03-20 DIAGNOSIS — R73.09 ABNORMAL GLUCOSE: Chronic | ICD-10-CM

## 2023-03-20 DIAGNOSIS — G62.9 NEUROPATHY: Chronic | ICD-10-CM

## 2023-03-20 DIAGNOSIS — S14.129S CENTRAL CORD SYNDROME, SEQUELA (HCC): Chronic | ICD-10-CM

## 2023-03-20 DIAGNOSIS — F41.1 GENERALIZED ANXIETY DISORDER: Chronic | ICD-10-CM

## 2023-03-20 DIAGNOSIS — J30.1 SEASONAL ALLERGIC RHINITIS DUE TO POLLEN: Chronic | ICD-10-CM

## 2023-03-20 DIAGNOSIS — I10 ESSENTIAL HYPERTENSION: Chronic | ICD-10-CM

## 2023-03-20 DIAGNOSIS — R42 VERTIGO: ICD-10-CM

## 2023-03-20 DIAGNOSIS — G82.50 QUADRIPLEGIA (HCC): ICD-10-CM

## 2023-03-20 DIAGNOSIS — I10 ESSENTIAL HYPERTENSION: Primary | Chronic | ICD-10-CM

## 2023-03-20 DIAGNOSIS — D69.6 THROMBOCYTOPENIA (HCC): ICD-10-CM

## 2023-03-20 PROBLEM — U07.1 COVID-19: Status: RESOLVED | Noted: 2022-11-14 | Resolved: 2023-03-20

## 2023-03-20 PROBLEM — E11.9 TYPE 2 DIABETES MELLITUS WITHOUT COMPLICATION, WITHOUT LONG-TERM CURRENT USE OF INSULIN (HCC): Status: ACTIVE | Noted: 2023-03-20

## 2023-03-20 PROBLEM — E11.9 TYPE 2 DIABETES MELLITUS WITHOUT COMPLICATION, WITHOUT LONG-TERM CURRENT USE OF INSULIN (HCC): Status: RESOLVED | Noted: 2023-03-20 | Resolved: 2023-03-20

## 2023-03-20 RX ORDER — OXYBUTYNIN CHLORIDE 5 MG/1
5 TABLET ORAL 2 TIMES DAILY
Qty: 180 TABLET | Refills: 1 | Status: SHIPPED | OUTPATIENT
Start: 2023-03-20 | End: 2023-03-20 | Stop reason: SDUPTHER

## 2023-03-20 RX ORDER — MECLIZINE HYDROCHLORIDE 25 MG/1
25 TABLET ORAL EVERY 8 HOURS PRN
Qty: 30 TABLET | Refills: 5 | Status: SHIPPED | OUTPATIENT
Start: 2023-03-20 | End: 2023-03-20 | Stop reason: SDUPTHER

## 2023-03-20 RX ORDER — LORAZEPAM 0.5 MG/1
0.5 TABLET ORAL DAILY PRN
Qty: 90 TABLET | Refills: 1 | Status: SHIPPED | OUTPATIENT
Start: 2023-03-20

## 2023-03-20 RX ORDER — CITALOPRAM 20 MG/1
20 TABLET ORAL DAILY
Qty: 90 TABLET | Refills: 1 | Status: SHIPPED | OUTPATIENT
Start: 2023-03-20 | End: 2023-03-20 | Stop reason: SDUPTHER

## 2023-03-20 RX ORDER — CITALOPRAM 20 MG/1
20 TABLET ORAL DAILY
Qty: 90 TABLET | Refills: 1 | Status: SHIPPED | OUTPATIENT
Start: 2023-03-20

## 2023-03-20 RX ORDER — LISINOPRIL 20 MG/1
20 TABLET ORAL DAILY
Qty: 90 TABLET | Refills: 1 | Status: SHIPPED | OUTPATIENT
Start: 2023-03-20 | End: 2023-03-20 | Stop reason: SDUPTHER

## 2023-03-20 RX ORDER — LISINOPRIL 20 MG/1
20 TABLET ORAL DAILY
Qty: 90 TABLET | Refills: 1 | Status: SHIPPED | OUTPATIENT
Start: 2023-03-20

## 2023-03-20 RX ORDER — MECLIZINE HYDROCHLORIDE 25 MG/1
25 TABLET ORAL EVERY 8 HOURS PRN
Qty: 90 TABLET | Refills: 0 | Status: SHIPPED | OUTPATIENT
Start: 2023-03-20

## 2023-03-20 RX ORDER — OXYBUTYNIN CHLORIDE 5 MG/1
5 TABLET ORAL 2 TIMES DAILY
Qty: 180 TABLET | Refills: 1 | Status: SHIPPED | OUTPATIENT
Start: 2023-03-20

## 2023-03-20 NOTE — PROGRESS NOTES
Name: Luigi Lockett      : 1950      MRN: [id number]  Encounter Provider: Varghese Melton MD  Encounter Date: 3/20/2023   Encounter department: [address] [place] [address] PRIMARY CARE    Assessment & Plan     1  Essential hypertension  Assessment & Plan:  Overall stable, continue current regimen      2  Generalized anxiety disorder  -     LORazepam (ATIVAN) 0 5 mg tablet; Take 1 tablet (0 5 mg total) by mouth daily as needed for anxiety    3  Vertigo    4  Central cord syndrome, sequela [facility])  Assessment & Plan:  Seems to be functioning well  Continue current regimen and follow-up  Push activity as tolerated      5  Quadriplegia ([address] )    6  Thrombocytopenia ([facility] [address] )    7  Seasonal allergic rhinitis due to pollen  Assessment & Plan:  Intermittently symptomatic  Continue as needed meds      8  Abnormal glucose  Assessment & Plan:  Continue to watch sweets and starch in diet      9  Neuropathy  Assessment & Plan:  Doing well, continue current regimen      BMI Counseling: Body mass index is 29 57 kg/m²  The BMI is above normal  Nutrition recommendations include moderation in carbohydrate intake  Exercise recommendations include vigorous physical activity 75 minutes/week  No pharmacotherapy was ordered  Rationale for BMI follow-up plan is due to patient being overweight or obese  Depression Screening and Follow-up Plan: Patient was screened for depression during today's encounter  They screened negative with a PHQ-2 score of 0  Subjective      ReviewPatient has history of spinal cord injury with central cord syndrome and does self catheterization to pass urine  Also has history of hypertension diabetes mellitus adult, allergic rhinitis lower leg neuropathy, migraines, and stress disorder  Overall has been doing well    Review of Systems   Constitutional: Negative for activity change, appetite change, chills, fatigue, fever and unexpected weight change     HENT: Negative for congestion, rhinorrhea and trouble swallowing  Eyes: Negative for visual disturbance  Respiratory: Positive for cough  Negative for apnea, chest tightness and shortness of breath  Cardiovascular: Positive for leg swelling (Intermittently on the left)  Negative for chest pain and palpitations  Gastrointestinal: Negative for abdominal distention, abdominal pain, constipation and diarrhea  Genitourinary: Positive for difficulty urinating (Does self Catheterizations 4-5 times a day)  Negative for enuresis  Musculoskeletal: Positive for arthralgias and gait problem  Negative for myalgias  Skin: Negative for rash  Allergic/Immunologic: Positive for environmental allergies  Neurological: Negative for dizziness, weakness, light-headedness, numbness and headaches  Hematological: Negative for adenopathy  Psychiatric/Behavioral: Negative for agitation, dysphoric mood and sleep disturbance         Current Outpatient Medications on File Prior to Visit   Medication Sig   • glucosamine-chondroitin 500-400 MG tablet Take 1 tablet by mouth daily   • meloxicam (MOBIC) 15 mg tablet Take 1 tablet (15 mg total) by mouth daily   • Multiple Vitamins-Minerals (MULTIVITAMIN ADULTS PO) Take 1 tablet by mouth daily   • [DISCONTINUED] citalopram (CeleXA) 20 mg tablet TAKE 1 TABLET BY MOUTH  DAILY   • [DISCONTINUED] lisinopril (ZESTRIL) 20 mg tablet TAKE 1 TABLET BY MOUTH  DAILY   • [DISCONTINUED] LORazepam (ATIVAN) 0 5 mg tablet TAKE 1 TABLET BY MOUTH  DAILY AS NEEDED FOR ANXIETY   • [DISCONTINUED] meclizine (ANTIVERT) 25 mg tablet Take 1 tablet (25 mg total) by mouth every 8 (eight) hours as needed for dizziness   • [DISCONTINUED] oxybutynin (DITROPAN) 5 mg tablet TAKE 1 TABLET BY MOUTH  TWICE DAILY   • acetaminophen (TYLENOL) 500 mg tablet Take 1,000 mg by mouth 2 (two) times a day (Patient not taking: Reported on 3/20/2023)   • gabapentin (Neurontin) 300 mg capsule Take 1 capsule (300 mg total) by mouth 3 (three) times a day   • hydrocortisone-acetic acid (VOSOL-HC) otic solution Administer 3 drops into both ears every 6 (six) hours As needed for itching in the ears (Patient not taking: Reported on 3/20/2023)   • ketoconazole (NIZORAL) 2 % cream Apply topically daily (Patient not taking: Reported on 3/20/2023)   • [DISCONTINUED] cyclobenzaprine (FLEXERIL) 10 mg tablet Take 1 tablet (10 mg total) by mouth 3 (three) times a day as needed for muscle spasms (Patient not taking: Reported on 10/5/2022)       Objective     /86 (BP Location: Left arm, Patient Position: Sitting, Cuff Size: Standard)   Pulse 63   Ht 6' (1 829 m)   Wt 98 9 kg (218 lb)   SpO2 96%   BMI 29 57 kg/m²     Physical Exam  Vitals and nursing note reviewed  Constitutional:       Appearance: Normal appearance  He is well-developed  HENT:      Head: Normocephalic  Nose: Nose normal       Mouth/Throat:      Mouth: Mucous membranes are moist    Eyes:      Conjunctiva/sclera: Conjunctivae normal    Neck:      Thyroid: No thyromegaly  Vascular: No carotid bruit  Cardiovascular:      Rate and Rhythm: Normal rate and regular rhythm  Heart sounds: Normal heart sounds  No murmur (Rate is 72) heard  Pulmonary:      Effort: Pulmonary effort is normal       Breath sounds: Normal breath sounds  Abdominal:      General: Abdomen is flat  There is no distension  Palpations: Abdomen is soft  There is no mass  Tenderness: There is no abdominal tenderness  Musculoskeletal:         General: Normal range of motion  Cervical back: Normal range of motion and neck supple  No tenderness  Right lower leg: No edema  Left lower leg: No edema  Lymphadenopathy:      Cervical: No cervical adenopathy  Skin:     General: Skin is warm and dry  Findings: No rash  Neurological:      Mental Status: He is alert  Motor: Weakness present        Coordination: Coordination abnormal       Gait: Gait abnormal       Deep Tendon Reflexes: Reflexes abnormal (Reflexes 3+)     Psychiatric:         Mood and Affect: Mood normal        Pretty Amaya MD

## 2023-03-20 NOTE — PATIENT INSTRUCTIONS
Overall seems to be doing well  Try to keep up the physical activity and watch the starch in the diet    We will continue on all meds as it is

## 2023-05-22 ENCOUNTER — OFFICE VISIT (OUTPATIENT)
Dept: FAMILY MEDICINE CLINIC | Facility: CLINIC | Age: 73
End: 2023-05-22

## 2023-05-22 VITALS
BODY MASS INDEX: 29.12 KG/M2 | SYSTOLIC BLOOD PRESSURE: 126 MMHG | DIASTOLIC BLOOD PRESSURE: 76 MMHG | HEIGHT: 72 IN | WEIGHT: 215 LBS

## 2023-05-22 DIAGNOSIS — L73.9 FOLLICULITIS: Primary | ICD-10-CM

## 2023-05-22 RX ORDER — DOXYCYCLINE HYCLATE 100 MG/1
100 CAPSULE ORAL EVERY 12 HOURS SCHEDULED
Qty: 20 CAPSULE | Refills: 0 | Status: SHIPPED | OUTPATIENT
Start: 2023-05-22 | End: 2023-06-01 | Stop reason: SDUPTHER

## 2023-05-22 RX ORDER — CEPHALEXIN 500 MG/1
CAPSULE ORAL
COMMUNITY
Start: 2023-05-19 | End: 2023-05-24

## 2023-05-22 NOTE — ASSESSMENT & PLAN NOTE
Discussed problem  May stop the cephalexin and will place on trial of doxycycline 100 mg twice a day for 10 days  We will recheck at that time    If this does seem to help would consider a maintenance course of doxycycline 100 mg daily for 2-month trial

## 2023-05-22 NOTE — PATIENT INSTRUCTIONS
Discussed the problem with the recurrent areas of skin irritation which looks like folliculitis    We will have stop the cephalexin and placed on trial of doxycycline 100 mg twice a day and reassess here in 10 days

## 2023-05-22 NOTE — PROGRESS NOTES
Name: Krissy Yuen      : 1950      MRN: [id number]  Encounter Provider: Garrett Mccain MD  Encounter Date: 2023   Encounter department: [address] [place] [address] PRIMARY CARE    Assessment & Plan     1  Folliculitis  Assessment & Plan:  Discussed problem  May stop the cephalexin and will place on trial of doxycycline 100 mg twice a day for 10 days  We will recheck at that time  If this does seem to help would consider a maintenance course of doxycycline 100 mg daily for 2-month trial    Orders:  -     doxycycline hyclate (VIBRAMYCIN) 100 mg capsule; Take 1 capsule (100 mg total) by mouth every 12 (twelve) hours for 10 days         Subjective      Patient has history of spinal cord injury with central cord syndrome and does self catheterization to pass urine  Also has history of hypertension diabetes mellitus adult, allergic rhinitis lower leg neuropathy, migraines, and stress disorder  Seen today for recurrent problems with bumps that develop on the skin and become painful and may enlarge and break  Had to develop on the left knee and when being checked on Saturday was given 5-day course of cephalexin which has not helped  These have been going on for a long time    Review of Systems   Constitutional: Negative for fever  HENT: Negative for congestion and sore throat  Respiratory: Negative for cough  Gastrointestinal: Negative for abdominal pain  Skin: Positive for color change and rash (Has bumps that develop that are painful and then enlarge and become reddish around the area)         Current Outpatient Medications on File Prior to Visit   Medication Sig   • citalopram (CeleXA) 20 mg tablet Take 1 tablet (20 mg total) by mouth daily   • glucosamine-chondroitin 500-400 MG tablet Take 1 tablet by mouth daily   • lisinopril (ZESTRIL) 20 mg tablet Take 1 tablet (20 mg total) by mouth daily   • LORazepam (ATIVAN) 0 5 mg tablet Take 1 tablet (0 5 mg total) by mouth daily as needed for anxiety   • meloxicam (MOBIC) 15 mg tablet Take 1 tablet (15 mg total) by mouth daily   • Multiple Vitamins-Minerals (MULTIVITAMIN ADULTS PO) Take 1 tablet by mouth daily   • oxybutynin (DITROPAN) 5 mg tablet Take 1 tablet (5 mg total) by mouth 2 (two) times a day   • acetaminophen (TYLENOL) 500 mg tablet Take 1,000 mg by mouth 2 (two) times a day (Patient not taking: Reported on 3/20/2023)   • cephalexin (KEFLEX) 500 mg capsule TAKE 1 CAPSULE BY MOUTH THREE TIMES DAILY FOR 5 DAYS START TAKING TONIGHT   • gabapentin (Neurontin) 300 mg capsule Take 1 capsule (300 mg total) by mouth 3 (three) times a day   • hydrocortisone-acetic acid (VOSOL-HC) otic solution Administer 3 drops into both ears every 6 (six) hours As needed for itching in the ears (Patient not taking: Reported on 3/20/2023)   • ketoconazole (NIZORAL) 2 % cream Apply topically daily (Patient not taking: Reported on 3/20/2023)   • [DISCONTINUED] cyclobenzaprine (FLEXERIL) 10 mg tablet Take 1 tablet (10 mg total) by mouth 3 (three) times a day as needed for muscle spasms (Patient not taking: Reported on 10/5/2022)   • [DISCONTINUED] meclizine (ANTIVERT) 25 mg tablet Take 1 tablet (25 mg total) by mouth every 8 (eight) hours as needed for dizziness       Objective     /76 (BP Location: Right arm, Patient Position: Sitting, Cuff Size: Large)   Ht 6' (1 829 m)   Wt 97 5 kg (215 lb)   BMI 29 16 kg/m²     Physical Exam  Vitals and nursing note reviewed  Constitutional:       Appearance: Normal appearance  He is well-developed  HENT:      Head: Normocephalic  Eyes:      Conjunctiva/sclera: Conjunctivae normal    Neck:      Thyroid: No thyromegaly  Cardiovascular:      Rate and Rhythm: Normal rate and regular rhythm  Heart sounds: Normal heart sounds  No murmur (Rate is 72) heard  Pulmonary:      Effort: Pulmonary effort is normal       Breath sounds: Normal breath sounds  Abdominal:      General: Abdomen is flat   There is no distension  Palpations: Abdomen is soft  There is no mass  Tenderness: There is no abdominal tenderness  Musculoskeletal:         General: Normal range of motion  Cervical back: Normal range of motion and neck supple  No tenderness  Right lower leg: No edema  Left lower leg: No edema  Lymphadenopathy:      Cervical: No cervical adenopathy  Skin:     General: Skin is warm and dry  Findings: No rash  Neurological:      Mental Status: He is alert and oriented to person, place, and time  Motor: No weakness        Coordination: Coordination abnormal       Gait: Gait abnormal    Psychiatric:         Mood and Affect: Mood normal        Gomez Limon MD

## 2023-06-01 ENCOUNTER — OFFICE VISIT (OUTPATIENT)
Dept: FAMILY MEDICINE CLINIC | Facility: CLINIC | Age: 73
End: 2023-06-01

## 2023-06-01 VITALS
BODY MASS INDEX: 29.61 KG/M2 | HEIGHT: 72 IN | WEIGHT: 218.6 LBS | OXYGEN SATURATION: 97 % | SYSTOLIC BLOOD PRESSURE: 119 MMHG | HEART RATE: 62 BPM | DIASTOLIC BLOOD PRESSURE: 67 MMHG

## 2023-06-01 DIAGNOSIS — L73.9 FOLLICULITIS: ICD-10-CM

## 2023-06-01 RX ORDER — DOXYCYCLINE HYCLATE 100 MG/1
100 CAPSULE ORAL DAILY
Qty: 30 CAPSULE | Refills: 0 | Status: SHIPPED | OUTPATIENT
Start: 2023-06-01 | End: 2023-07-01

## 2023-06-01 NOTE — PATIENT INSTRUCTIONS
Overall the folliculitis seems much better  We will have continue for 1 additional month taking the doxycycline once a day and then may stop    If the problem seems to return would then use the doxycycline for an extended period of time

## 2023-06-01 NOTE — PROGRESS NOTES
Name: Le Sherman      : 1950      MRN: [id number]  Encounter Provider: Pravin Aviles MD  Encounter Date: 2023   Encounter department: [address] [place] [address] PRIMARY CARE    Assessment & Plan     1  Folliculitis  Assessment & Plan:  Overall seems much better  We will have continue for 1 additional month on the doxycycline 100 mg daily  At that point may stop and if the problem returns we will need to restart    Orders:  -     doxycycline hyclate (VIBRAMYCIN) 100 mg capsule; Take 1 capsule (100 mg total) by mouth in the morning         Subjective      Patient has history of spinal cord injury with central cord syndrome and does self catheterization to pass urine  Also has history of hypertension diabetes mellitus adult, allergic rhinitis lower leg neuropathy, migraines, and stress disorder  Seen today for recurrent problems with bumps that develop on the skin and become painful and may enlarge and break  Had been given cephalexin and he was on it for 5 days which did not help  This was felt to represent folliculitis and was placed on 10-day course of doxycycline  Overall the knee feels much better and the other smaller lesions on the arms have resolved    Review of Systems   Constitutional: Negative for fatigue (Energy level has improved) and fever  Gastrointestinal: Negative for abdominal pain and nausea  Skin: Positive for rash (On the left knee  The other areas of rash on the arms has resolved)  Psychiatric/Behavioral: Positive for sleep disturbance (Does feel his sleep is different when taking the antibiotic but this is not a problem)         Current Outpatient Medications on File Prior to Visit   Medication Sig   • acetaminophen (TYLENOL) 500 mg tablet Take 1,000 mg by mouth 2 (two) times a day   • citalopram (CeleXA) 20 mg tablet Take 1 tablet (20 mg total) by mouth daily   • glucosamine-chondroitin 500-400 MG tablet Take 1 tablet by mouth daily   • hydrocortisone-acetic acid (VOSOL-HC) otic solution Administer 3 drops into both ears every 6 (six) hours As needed for itching in the ears   • ketoconazole (NIZORAL) 2 % cream Apply topically daily   • lisinopril (ZESTRIL) 20 mg tablet Take 1 tablet (20 mg total) by mouth daily   • LORazepam (ATIVAN) 0 5 mg tablet Take 1 tablet (0 5 mg total) by mouth daily as needed for anxiety   • meloxicam (MOBIC) 15 mg tablet Take 1 tablet (15 mg total) by mouth daily   • Multiple Vitamins-Minerals (MULTIVITAMIN ADULTS PO) Take 1 tablet by mouth daily   • oxybutynin (DITROPAN) 5 mg tablet Take 1 tablet (5 mg total) by mouth 2 (two) times a day   • [DISCONTINUED] doxycycline hyclate (VIBRAMYCIN) 100 mg capsule Take 1 capsule (100 mg total) by mouth every 12 (twelve) hours for 10 days   • gabapentin (Neurontin) 300 mg capsule Take 1 capsule (300 mg total) by mouth 3 (three) times a day   • [DISCONTINUED] cyclobenzaprine (FLEXERIL) 10 mg tablet Take 1 tablet (10 mg total) by mouth 3 (three) times a day as needed for muscle spasms (Patient not taking: Reported on 10/5/2022)       Objective     /67 (BP Location: Left arm, Patient Position: Sitting, Cuff Size: Large)   Pulse 62   Ht 6' (1 829 m)   Wt 99 2 kg (218 lb 9 6 oz)   SpO2 97%   BMI 29 65 kg/m²     Physical Exam  Vitals and nursing note reviewed  Constitutional:       Appearance: Normal appearance  He is well-developed  HENT:      Head: Normocephalic  Neck:      Thyroid: No thyromegaly  Cardiovascular:      Rate and Rhythm: Normal rate and regular rhythm  Heart sounds: Murmur (Rate is 72) heard  Pulmonary:      Effort: Pulmonary effort is normal       Breath sounds: Normal breath sounds  Abdominal:      General: Abdomen is flat  There is no distension  Palpations: There is no mass  Tenderness: There is no abdominal tenderness  Musculoskeletal:         General: Normal range of motion        Cervical back: Normal range of motion and neck supple  No tenderness  Right lower leg: No edema  Left lower leg: No edema  Lymphadenopathy:      Cervical: No cervical adenopathy  Skin:     General: Skin is warm and dry  Neurological:      Mental Status: He is alert and oriented to person, place, and time  Motor: No weakness        Coordination: Coordination normal       Gait: Gait abnormal    Psychiatric:         Mood and Affect: Mood normal        Padma Aguayo MD

## 2023-06-01 NOTE — ASSESSMENT & PLAN NOTE
Overall seems much better  We will have continue for 1 additional month on the doxycycline 100 mg daily    At that point may stop and if the problem returns we will need to restart

## 2023-07-19 ENCOUNTER — OFFICE VISIT (OUTPATIENT)
Dept: FAMILY MEDICINE CLINIC | Facility: CLINIC | Age: 73
End: 2023-07-19
Payer: MEDICARE

## 2023-07-19 VITALS
HEIGHT: 72 IN | OXYGEN SATURATION: 100 % | HEART RATE: 67 BPM | WEIGHT: 215 LBS | SYSTOLIC BLOOD PRESSURE: 126 MMHG | BODY MASS INDEX: 29.12 KG/M2 | DIASTOLIC BLOOD PRESSURE: 66 MMHG

## 2023-07-19 DIAGNOSIS — J30.1 SEASONAL ALLERGIC RHINITIS DUE TO POLLEN: Chronic | ICD-10-CM

## 2023-07-19 DIAGNOSIS — I10 ESSENTIAL HYPERTENSION: Primary | Chronic | ICD-10-CM

## 2023-07-19 DIAGNOSIS — S14.129S CENTRAL CORD SYNDROME, SEQUELA (HCC): Chronic | ICD-10-CM

## 2023-07-19 DIAGNOSIS — L73.9 FOLLICULITIS: ICD-10-CM

## 2023-07-19 DIAGNOSIS — F41.1 GENERALIZED ANXIETY DISORDER: Chronic | ICD-10-CM

## 2023-07-19 PROCEDURE — 99214 OFFICE O/P EST MOD 30 MIN: CPT | Performed by: FAMILY MEDICINE

## 2023-07-19 NOTE — ASSESSMENT & PLAN NOTE
This has cleared and is now off antibiotic.   If restarts will need to consider daily dose of doxycycline

## 2023-07-19 NOTE — PROGRESS NOTES
Name: Olimpia Bearden      : 1950      MRN: [id number]  Encounter Provider: Zina Ralph MD  Encounter Date: 2023   Encounter department: [address] [place] [address] PRIMARY CARE    Assessment & Plan     1. Essential hypertension  Assessment & Plan:  Overall stable, continue current regimen      2. Central cord syndrome, sequela [facility])  Assessment & Plan:  Seems to be functioning well. Continue current regimen and follow-up      3. Folliculitis  Assessment & Plan: This has cleared and is now off antibiotic. If restarts will need to consider daily dose of doxycycline      4. Generalized anxiety disorder  Assessment & Plan:  Overall doing well, continue current regimen      5. Seasonal allergic rhinitis due to pollen  Assessment & Plan:  Doing well, continue as needed over-the-counter meds           Subjective      ReviewPatient has history of spinal cord injury with central cord syndrome and does self catheterization to pass urine. Also has history of hypertension diabetes mellitus adult, allergic rhinitis lower leg neuropathy, migraines, and stress disorder. Was having trouble with folliculitis which has cleared and has been off the antibiotic for 1 week. Overall has been doing well    Review of Systems   Constitutional: Negative for activity change, appetite change, chills, fatigue, fever and unexpected weight change. HENT: Negative for congestion, rhinorrhea and trouble swallowing. Eyes: Negative for visual disturbance. Respiratory: Negative for apnea, cough, chest tightness and shortness of breath. Cardiovascular: Negative for chest pain, palpitations and leg swelling. Gastrointestinal: Negative for abdominal distention, abdominal pain, constipation and diarrhea. Endocrine: Negative for polyuria. Genitourinary: Positive for difficulty urinating (Does self catheterizations 4-5 times a day). Musculoskeletal: Positive for gait problem.  Negative for arthralgias and myalgias. Skin: Negative for rash. Allergic/Immunologic: Positive for environmental allergies. Neurological: Positive for dizziness. Negative for weakness, light-headedness, numbness and headaches. Hematological: Negative for adenopathy. Psychiatric/Behavioral: Negative for agitation, dysphoric mood and sleep disturbance. Current Outpatient Medications on File Prior to Visit   Medication Sig   • acetaminophen (TYLENOL) 500 mg tablet Take 1,000 mg by mouth 2 (two) times a day   • citalopram (CeleXA) 20 mg tablet Take 1 tablet (20 mg total) by mouth daily   • glucosamine-chondroitin 500-400 MG tablet Take 1 tablet by mouth daily   • hydrocortisone-acetic acid (VOSOL-HC) otic solution Administer 3 drops into both ears every 6 (six) hours As needed for itching in the ears   • ketoconazole (NIZORAL) 2 % cream Apply topically daily   • lisinopril (ZESTRIL) 20 mg tablet Take 1 tablet (20 mg total) by mouth daily   • LORazepam (ATIVAN) 0.5 mg tablet Take 1 tablet (0.5 mg total) by mouth daily as needed for anxiety   • meloxicam (MOBIC) 15 mg tablet Take 1 tablet (15 mg total) by mouth daily   • Multiple Vitamins-Minerals (MULTIVITAMIN ADULTS PO) Take 1 tablet by mouth daily   • oxybutynin (DITROPAN) 5 mg tablet Take 1 tablet (5 mg total) by mouth 2 (two) times a day   • gabapentin (Neurontin) 300 mg capsule Take 1 capsule (300 mg total) by mouth 3 (three) times a day   • [DISCONTINUED] cyclobenzaprine (FLEXERIL) 10 mg tablet Take 1 tablet (10 mg total) by mouth 3 (three) times a day as needed for muscle spasms (Patient not taking: Reported on 10/5/2022)       Objective     /66 (BP Location: Left arm, Patient Position: Sitting, Cuff Size: Large)   Pulse 67   Ht 6' (1.829 m)   Wt 97.5 kg (215 lb)   SpO2 100%   BMI 29.16 kg/m²     Physical Exam  Vitals and nursing note reviewed. Constitutional:       Appearance: Normal appearance. He is well-developed. HENT:      Head: Normocephalic. Nose: Nose normal.   Eyes:      Conjunctiva/sclera: Conjunctivae normal.   Neck:      Thyroid: No thyromegaly. Vascular: No carotid bruit. Cardiovascular:      Rate and Rhythm: Normal rate and regular rhythm. Heart sounds: Normal heart sounds. No murmur (Rate is 72) heard. Pulmonary:      Effort: Pulmonary effort is normal.      Breath sounds: Normal breath sounds. Abdominal:      General: Abdomen is flat. There is no distension. Palpations: There is no mass. Tenderness: There is no abdominal tenderness. Musculoskeletal:         General: Normal range of motion. Cervical back: Normal range of motion and neck supple. No tenderness. Right lower leg: No edema. Left lower leg: No edema. Lymphadenopathy:      Cervical: No cervical adenopathy. Skin:     General: Skin is warm and dry. Findings: No rash. Neurological:      Mental Status: He is alert. Motor: No weakness. Coordination: Coordination normal.      Gait: Gait abnormal.      Deep Tendon Reflexes: Reflexes abnormal (Reflexes 3+).    Psychiatric:         Mood and Affect: Mood normal.       Fer Perrin MD

## 2023-08-06 DIAGNOSIS — I10 ESSENTIAL HYPERTENSION: Chronic | ICD-10-CM

## 2023-08-06 DIAGNOSIS — S14.129S CENTRAL CORD SYNDROME, SEQUELA (HCC): Chronic | ICD-10-CM

## 2023-08-06 DIAGNOSIS — F41.1 GENERALIZED ANXIETY DISORDER: Chronic | ICD-10-CM

## 2023-08-07 RX ORDER — CITALOPRAM 20 MG/1
20 TABLET ORAL DAILY
Qty: 90 TABLET | Refills: 1 | Status: SHIPPED | OUTPATIENT
Start: 2023-08-07

## 2023-08-07 RX ORDER — OXYBUTYNIN CHLORIDE 5 MG/1
5 TABLET ORAL 2 TIMES DAILY
Qty: 180 TABLET | Refills: 1 | Status: SHIPPED | OUTPATIENT
Start: 2023-08-07

## 2023-08-07 RX ORDER — LISINOPRIL 20 MG/1
20 TABLET ORAL DAILY
Qty: 90 TABLET | Refills: 1 | Status: SHIPPED | OUTPATIENT
Start: 2023-08-07

## 2023-10-23 ENCOUNTER — OFFICE VISIT (OUTPATIENT)
Dept: FAMILY MEDICINE CLINIC | Facility: CLINIC | Age: 73
End: 2023-10-23
Payer: MEDICARE

## 2023-10-23 VITALS
SYSTOLIC BLOOD PRESSURE: 119 MMHG | WEIGHT: 218.6 LBS | HEART RATE: 71 BPM | DIASTOLIC BLOOD PRESSURE: 65 MMHG | BODY MASS INDEX: 29.61 KG/M2 | OXYGEN SATURATION: 95 % | HEIGHT: 72 IN

## 2023-10-23 DIAGNOSIS — Z23 ENCOUNTER FOR IMMUNIZATION: ICD-10-CM

## 2023-10-23 DIAGNOSIS — S14.129S CENTRAL CORD SYNDROME, SEQUELA (HCC): Chronic | ICD-10-CM

## 2023-10-23 DIAGNOSIS — G62.9 NEUROPATHY: Chronic | ICD-10-CM

## 2023-10-23 DIAGNOSIS — I10 ESSENTIAL HYPERTENSION: Primary | Chronic | ICD-10-CM

## 2023-10-23 DIAGNOSIS — F41.1 GENERALIZED ANXIETY DISORDER: Chronic | ICD-10-CM

## 2023-10-23 PROCEDURE — 90662 IIV NO PRSV INCREASED AG IM: CPT | Performed by: FAMILY MEDICINE

## 2023-10-23 PROCEDURE — 99214 OFFICE O/P EST MOD 30 MIN: CPT | Performed by: FAMILY MEDICINE

## 2023-10-23 PROCEDURE — G0008 ADMIN INFLUENZA VIRUS VAC: HCPCS | Performed by: FAMILY MEDICINE

## 2023-10-23 NOTE — PROGRESS NOTES
Name: Raul Mcmillan      : 1950      MRN: [id number]  Encounter Provider: Melissa Cisse MD  Encounter Date: 10/23/2023   Encounter department: [address] [place] [address] PRIMARY CARE    Assessment & Plan     1. Essential hypertension  Assessment & Plan:  Overall stable, continue current regimen      2. Encounter for immunization  -     influenza vaccine, high-dose, PF 0.7 mL (FLUZONE HIGH-DOSE)    3. Central cord syndrome, sequela [facility])  Assessment & Plan:  Seems to be functioning well. Continue current regimen and follow-up      4. Generalized anxiety disorder  Assessment & Plan:  Doing well, continue current regimen      5. Neuropathy  Assessment & Plan:  Problem persist but seems to be functioning well. Continue current meds and follow-up             Subjective        Patient has history of spinal cord injury with central cord syndrome and does self catheterization to pass urine. Also has history of hypertension diabetes mellitus adult, allergic rhinitis lower leg neuropathy, migraines, and stress disorder. Review of Systems   Constitutional:  Negative for activity change, appetite change, chills, fatigue, fever and unexpected weight change. HENT:  Negative for congestion, rhinorrhea and trouble swallowing. Eyes:  Negative for visual disturbance. Respiratory:  Negative for apnea, cough, chest tightness and shortness of breath. Cardiovascular:  Negative for chest pain, palpitations and leg swelling. Gastrointestinal:  Negative for abdominal distention, abdominal pain, constipation and diarrhea. Endocrine: Negative for polyuria. Genitourinary:  Positive for difficulty urinating (Does self-catheterization 4-5 times a day). Negative for enuresis. Musculoskeletal:  Positive for arthralgias, back pain and gait problem (Is functioning at baseline level). Negative for myalgias. Skin:  Negative for rash. Allergic/Immunologic: Negative for environmental allergies. Neurological:  Positive for dizziness. Negative for weakness, light-headedness, numbness and headaches. Hematological:  Negative for adenopathy. Psychiatric/Behavioral:  Negative for agitation and sleep disturbance. Current Outpatient Medications on File Prior to Visit   Medication Sig    acetaminophen (TYLENOL) 500 mg tablet Take 1,000 mg by mouth 2 (two) times a day    citalopram (CeleXA) 20 mg tablet TAKE 1 TABLET BY MOUTH DAILY    gabapentin (Neurontin) 300 mg capsule Take 1 capsule (300 mg total) by mouth 3 (three) times a day    glucosamine-chondroitin 500-400 MG tablet Take 1 tablet by mouth daily    hydrocortisone-acetic acid (VOSOL-HC) otic solution Administer 3 drops into both ears every 6 (six) hours As needed for itching in the ears    ketoconazole (NIZORAL) 2 % cream Apply topically daily    lisinopril (ZESTRIL) 20 mg tablet TAKE 1 TABLET BY MOUTH DAILY    LORazepam (ATIVAN) 0.5 mg tablet Take 1 tablet (0.5 mg total) by mouth daily as needed for anxiety    meloxicam (MOBIC) 15 mg tablet Take 1 tablet (15 mg total) by mouth daily    Multiple Vitamins-Minerals (MULTIVITAMIN ADULTS PO) Take 1 tablet by mouth daily    oxybutynin (DITROPAN) 5 mg tablet TAKE 1 TABLET BY MOUTH TWICE  DAILY    [DISCONTINUED] cyclobenzaprine (FLEXERIL) 10 mg tablet Take 1 tablet (10 mg total) by mouth 3 (three) times a day as needed for muscle spasms (Patient not taking: Reported on 10/5/2022)       Objective     /65 (BP Location: Left arm, Patient Position: Sitting, Cuff Size: Standard)   Pulse 71   Ht 6' (1.829 m)   Wt 99.2 kg (218 lb 9.6 oz)   SpO2 95%   BMI 29.65 kg/m²     Physical Exam  Vitals and nursing note reviewed. Constitutional:       Appearance: Normal appearance. He is well-developed. HENT:      Head: Normocephalic. Nose: Nose normal.   Eyes:      Conjunctiva/sclera: Conjunctivae normal.   Neck:      Thyroid: No thyromegaly. Vascular: No carotid bruit.    Cardiovascular: Rate and Rhythm: Normal rate and regular rhythm. Heart sounds: Normal heart sounds. No murmur (Rate is 72) heard. Pulmonary:      Effort: Pulmonary effort is normal.      Breath sounds: Normal breath sounds. Abdominal:      General: Abdomen is flat. There is no distension. Palpations: Abdomen is soft. There is no mass. Tenderness: There is no abdominal tenderness. Musculoskeletal:         General: Normal range of motion. Cervical back: Normal range of motion and neck supple. No tenderness. Right lower leg: No edema. Left lower leg: No edema. Lymphadenopathy:      Cervical: No cervical adenopathy. Skin:     General: Skin is warm and dry. Findings: No rash. Neurological:      Mental Status: He is alert and oriented to person, place, and time. Motor: No weakness. Coordination: Coordination abnormal.      Gait: Gait abnormal.      Deep Tendon Reflexes: Reflexes abnormal (Reflexes 3-4+).    Psychiatric:         Mood and Affect: Mood normal.       Steph Medina MD

## 2023-10-23 NOTE — PATIENT INSTRUCTIONS
Overall seems to be doing well. Flu shot given today. Continue current meds.   Try to push activity as tolerated

## 2024-01-29 ENCOUNTER — RA CDI HCC (OUTPATIENT)
Dept: OTHER | Facility: HOSPITAL | Age: 74
End: 2024-01-29

## 2024-01-30 ENCOUNTER — OFFICE VISIT (OUTPATIENT)
Dept: FAMILY MEDICINE CLINIC | Facility: CLINIC | Age: 74
End: 2024-01-30
Payer: MEDICARE

## 2024-01-30 VITALS
BODY MASS INDEX: 28.99 KG/M2 | SYSTOLIC BLOOD PRESSURE: 102 MMHG | DIASTOLIC BLOOD PRESSURE: 70 MMHG | WEIGHT: 214 LBS | HEIGHT: 72 IN

## 2024-01-30 DIAGNOSIS — Z00.00 MEDICARE ANNUAL WELLNESS VISIT, SUBSEQUENT: ICD-10-CM

## 2024-01-30 DIAGNOSIS — G82.50 QUADRIPLEGIA (HCC): ICD-10-CM

## 2024-01-30 DIAGNOSIS — S14.129S CENTRAL CORD SYNDROME, SEQUELA (HCC): Chronic | ICD-10-CM

## 2024-01-30 DIAGNOSIS — D69.6 THROMBOCYTOPENIA (HCC): ICD-10-CM

## 2024-01-30 DIAGNOSIS — I10 ESSENTIAL HYPERTENSION: Primary | Chronic | ICD-10-CM

## 2024-01-30 DIAGNOSIS — F41.1 GENERALIZED ANXIETY DISORDER: Chronic | ICD-10-CM

## 2024-01-30 DIAGNOSIS — R73.09 ABNORMAL GLUCOSE: Chronic | ICD-10-CM

## 2024-01-30 DIAGNOSIS — D68.00 VON WILLEBRAND'S DISEASE (HCC): ICD-10-CM

## 2024-01-30 PROBLEM — B35.6 TINEA CRURIS: Status: RESOLVED | Noted: 2022-09-26 | Resolved: 2024-01-30

## 2024-01-30 PROCEDURE — 99214 OFFICE O/P EST MOD 30 MIN: CPT | Performed by: FAMILY MEDICINE

## 2024-01-30 PROCEDURE — G0439 PPPS, SUBSEQ VISIT: HCPCS | Performed by: FAMILY MEDICINE

## 2024-01-30 RX ORDER — FEXOFENADINE HCL 180 MG/1
180 TABLET ORAL DAILY
COMMUNITY

## 2024-01-30 NOTE — PATIENT INSTRUCTIONS
Overall patient is very functional.  Will check renal panel for history of hypertension.  Try to push daily activity and watch the starch in the diet.  Continue on current meds

## 2024-01-30 NOTE — ASSESSMENT & PLAN NOTE
Does complain of more muscle tightness.  Advised to do gentle stretching of the hamstrings and low back each morning.  Continue overall follow-up

## 2024-01-30 NOTE — PROGRESS NOTES
Assessment and Plan:     Problem List Items Addressed This Visit          Cardiovascular and Mediastinum    Essential hypertension - Primary (Chronic)     Overall stable, continue current regimen         Relevant Orders    Renal function panel       Nervous and Auditory    Central cord syndrome (HCC) (Chronic)     Does continue follow-up for this and self-catheterization         Quadriplegia (HCC)     Does complain of more muscle tightness.  Advised to do gentle stretching of the hamstrings and low back each morning.  Continue overall follow-up            Hematopoietic and Hemostatic    Von Willebrand's disease (Chronic)    Thrombocytopenia (HCC)       Other    Abnormal glucose (Chronic)     Continue to watch sweets and starch in diet         Generalized anxiety disorder (Chronic)     Doing well, continue current regimen          Other Visit Diagnoses       Medicare annual wellness visit, subsequent                 Preventive health issues were discussed with patient, and age appropriate screening tests were ordered as noted in patient's After Visit Summary.  Personalized health advice and appropriate referrals for health education or preventive services given if needed, as noted in patient's After Visit Summary.     History of Present Illness:     Patient presents for a Medicare Wellness Visit        Patient has history of spinal cord injury with central cord syndrome and does self catheterization to pass urine.  Also has history of hypertension diabetes mellitus adult, allergic rhinitis lower leg neuropathy, migraines, and stress disorder.       Patient Care Team:  Felipe Villasenor MD as PCP - General (Family Medicine)     Review of Systems:     Review of Systems   Constitutional:  Negative for activity change, appetite change, chills, fatigue, fever and unexpected weight change.   HENT:  Negative for congestion, dental problem, hearing loss, rhinorrhea, trouble swallowing and voice change.    Eyes:   "Negative for visual disturbance.   Respiratory:  Negative for apnea, cough, chest tightness and shortness of breath.    Cardiovascular:  Positive for leg swelling (Intermittent). Negative for chest pain and palpitations.   Gastrointestinal:  Negative for abdominal distention, abdominal pain, constipation and diarrhea.   Endocrine: Negative for polyuria.   Genitourinary:  Positive for difficulty urinating (Does self-catheterization 4-5 times a day). Negative for enuresis.   Musculoskeletal:  Positive for arthralgias and back pain. Negative for myalgias.   Skin:  Negative for rash.   Allergic/Immunologic: Positive for environmental allergies.   Neurological:  Positive for dizziness. Negative for weakness, light-headedness, numbness and headaches.   Hematological:  Negative for adenopathy. Does not bruise/bleed easily.   Psychiatric/Behavioral:  Negative for agitation, dysphoric mood and sleep disturbance.         Problem List:     Patient Active Problem List   Diagnosis    Essential hypertension    Seasonal allergic rhinitis due to pollen    Generalized anxiety disorder    Central cord syndrome (HCC)    Neuropathy    Quadriplegia (HCC)    Thrombocytopenia (HCC)    Von Willebrand's disease    Muscle pain, cervical    Abnormal glucose    Chronic fatigue    Vertigo    Ambulatory dysfunction    Folliculitis      Past Medical and Surgical History:     Past Medical History:   Diagnosis Date    Allergic NA    Allergic rhinitis     Anxiety 7/1998    Arthritis     C2 spinal cord injury (HCC)     Clotting disorder (HCC) 7/1998    Depression     Essential hypertension     Hypertension 7/2018    Self-catheterizes urinary bladder     Shingles 7/2017    Urinary tract infection 7/2000    Von Willebrand disease (HCC)     Pt requires DDAVP prior to \"major\" surgery      Past Surgical History:   Procedure Laterality Date    CHOLECYSTECTOMY      FEEDING TUBE PLACEMENT      GASTROSTOMY  7/2000      Family History:     Family History "   Problem Relation Age of Onset    Diabetes Mother     Hypertension Mother     Hyperlipidemia Mother     Diabetes Father     Hypertension Father     Hyperlipidemia Father     Hyperlipidemia Maternal Grandmother       Social History:     Social History     Socioeconomic History    Marital status: /Civil Union     Spouse name: None    Number of children: None    Years of education: None    Highest education level: None   Occupational History    None   Tobacco Use    Smoking status: Former     Current packs/day: 1.00     Average packs/day: 1 pack/day for 20.0 years (20.0 total pack years)     Types: Cigarettes    Smokeless tobacco: Never   Vaping Use    Vaping status: Never Used   Substance and Sexual Activity    Alcohol use: Not Currently     Comment: Rarely     Drug use: Never    Sexual activity: Not Currently   Other Topics Concern    None   Social History Narrative    None     Social Determinants of Health     Financial Resource Strain: Low Risk  (1/28/2024)    Overall Financial Resource Strain (CARDIA)     Difficulty of Paying Living Expenses: Not hard at all   Food Insecurity: Not on file   Transportation Needs: No Transportation Needs (1/28/2024)    PRAPARE - Transportation     Lack of Transportation (Medical): No     Lack of Transportation (Non-Medical): No   Physical Activity: Not on file   Stress: Not on file   Social Connections: Not on file   Intimate Partner Violence: Not on file   Housing Stability: Not on file      Medications and Allergies:     Current Outpatient Medications   Medication Sig Dispense Refill    citalopram (CeleXA) 20 mg tablet TAKE 1 TABLET BY MOUTH DAILY 90 tablet 1    fexofenadine (Allegra Allergy) 180 MG tablet Take 180 mg by mouth daily      glucosamine-chondroitin 500-400 MG tablet Take 1 tablet by mouth daily      hydrocortisone-acetic acid (VOSOL-HC) otic solution Administer 3 drops into both ears every 6 (six) hours As needed for itching in the ears 10 mL 1    ketoconazole  (NIZORAL) 2 % cream Apply topically daily 15 g 1    lisinopril (ZESTRIL) 20 mg tablet TAKE 1 TABLET BY MOUTH DAILY 90 tablet 1    LORazepam (ATIVAN) 0.5 mg tablet Take 1 tablet (0.5 mg total) by mouth daily as needed for anxiety 90 tablet 1    Multiple Vitamins-Minerals (MULTIVITAMIN ADULTS PO) Take 1 tablet by mouth daily      oxybutynin (DITROPAN) 5 mg tablet TAKE 1 TABLET BY MOUTH TWICE  DAILY 180 tablet 1    gabapentin (Neurontin) 300 mg capsule Take 1 capsule (300 mg total) by mouth 3 (three) times a day 270 capsule 3    meloxicam (MOBIC) 15 mg tablet Take 1 tablet (15 mg total) by mouth daily 90 tablet 3     No current facility-administered medications for this visit.     Allergies   Allergen Reactions    Penicillins Other (See Comments)     RASH, hives; valentín PIP 5/22/01 jgo    Sulfamethoxazole-Trimethoprim Other (See Comments)     developed rash      Immunizations:     Immunization History   Administered Date(s) Administered    COVID-19 PFIZER VACCINE 0.3 ML IM 03/24/2021, 12/08/2021    COVID-19 Pfizer vac (Grover-sucrose, gray cap) 12 yr+ IM 05/18/2022    COVID-19, unspecified 03/03/2021    INFLUENZA 09/27/2001, 10/31/2002, 10/25/2006, 11/20/2007, 11/25/2008, 12/07/2009, 11/30/2010, 09/16/2011, 10/02/2013, 11/21/2014, 09/30/2015, 11/18/2016, 12/13/2017, 10/23/2018    Influenza, high dose seasonal 0.7 mL 11/15/2019, 12/09/2020, 09/15/2021, 09/26/2022, 10/23/2023    Pneumococcal Polysaccharide PPV23 08/20/2014    Tdap 05/16/2014      Health Maintenance:         Topic Date Due    Hepatitis C Screening  Never done    Colorectal Cancer Screening  09/16/2024         Topic Date Due    Pneumococcal Vaccine: 65+ Years (2 - PCV) 11/02/2016    COVID-19 Vaccine (5 - 2023-24 season) 09/01/2023      Medicare Screening Tests and Risk Assessments:     Tab is here for his Subsequent Wellness visit. Last Medicare Wellness visit information reviewed, patient interviewed and updates made to the record today.      Health Risk  Assessment:   Patient rates overall health as good. Patient feels that their physical health rating is slightly worse. Patient is very satisfied with their life. Eyesight was rated as same. Hearing was rated as slightly worse. Patient feels that their emotional and mental health rating is same. Patients states they are never, rarely angry. Patient states they are often unusually tired/fatigued. Pain experienced in the last 7 days has been some. Patient's pain rating has been 5/10. Patient states that he has experienced no weight loss or gain in last 6 months. Continues to have his low back pain    Fall Risk Screening:   In the past year, patient has experienced: history of falling in past year    Injured during fall?: No    Feels unsteady when standing or walking?: No    Worried about falling?: No      Home Safety:  Patient does not have trouble with stairs inside or outside of their home. Patient has working smoke alarms and has no working carbon monoxide detector. Home safety hazards include: none. I did advise carbon monoxide monitor in the house    Nutrition:   Current diet is Regular. Watch sweets and starch in diet    Medications:   Patient is currently taking over-the-counter supplements. OTC medications include: see medication list. Patient is able to manage medications. No issues    Activities of Daily Living (ADLs)/Instrumental Activities of Daily Living (IADLs):   Walk and transfer into and out of bed and chair?: Yes  Dress and groom yourself?: Yes    Bathe or shower yourself?: Yes    Feed yourself? Yes  Do your laundry/housekeeping?: Yes  Manage your money, pay your bills and track your expenses?: Yes  Make your own meals?: Yes    Do your own shopping?: Yes    ADL comments: Overall functions well around the house.  No issues    Durable Medical Equipment Suppliers  [employer]    Previous Hospitalizations:   Any hospitalizations or ED visits within the last 12 months?: No      Hospitalization Comments:  Follows up with [facility] for urology and physiatry    Advance Care Planning:   Living will: No    Durable POA for healthcare: Yes    Advanced directive: No    Advanced directive counseling given: Yes    Five wishes given: No    End of Life Decisions reviewed with patient: No      Comments: Discussed the importance of doing this and he does have a form for this    Cognitive Screening:   Provider or family/friend/caregiver concerned regarding cognition?: No    PREVENTIVE SCREENINGS      Cardiovascular Screening:    General: Screening Current      Diabetes Screening:       Due for: Blood Glucose      Colorectal Cancer Screening:     General: Screening Current      Prostate Cancer Screening:    General: Screening Current      Osteoporosis Screening:    General: Screening Not Indicated      Abdominal Aortic Aneurysm (AAA) Screening:    Risk factors include: age between 65-76 yo and tobacco use        General: Screening Not Indicated      Lung Cancer Screening:     General: Screening Not Indicated      Hepatitis C Screening:    General: Screening Current    Screening, Brief Intervention, and Referral to Treatment (SBIRT)    Screening  Typical number of drinks in a day: 0  Typical number of drinks in a week: 0  Interpretation: Low risk drinking behavior.    AUDIT-C Screenin) How often did you have a drink containing alcohol in the past year? monthly or less  2) How many drinks did you have on a typical day when you were drinking in the past year? 1 to 2  3) How often did you have 6 or more drinks on one occasion in the past year? never    AUDIT-C Score: 1  Interpretation: Score 0-3 (male): Negative screen for alcohol misuse    Single Item Drug Screening:  How often have you used an illegal drug (including marijuana) or a prescription medication for non-medical reasons in the past year? never    Single Item Drug Screen Score: 0  Interpretation: Negative screen for possible drug use disorder    Brief  Intervention  Alcohol & drug use screenings were reviewed. No concerns regarding substance use disorder identified.     Annual Depression Screening  Time spent screening and evaluating the patient for depression during today's encounter was 5 minutes.    No results found.     Physical Exam:     Blood Pressure 102/70 (BP Location: Left arm, Patient Position: Sitting, Cuff Size: Large)   Height 6' (1.829 m)   Weight 97.1 kg (214 lb)   Body Mass Index 29.02 kg/m²     Physical Exam  Vitals and nursing note reviewed.   Constitutional:       Appearance: Normal appearance.   HENT:      Head: Normocephalic.      Right Ear: Tympanic membrane, ear canal and external ear normal. Decreased hearing (25 dB hearing screen off except at 500 Hz.  No difficulty with conversation) noted.      Left Ear: Tympanic membrane, ear canal and external ear normal. Decreased hearing (25 dB hearing screen off except at 500 Hz) noted.      Nose: Nose normal.      Mouth/Throat:      Mouth: Mucous membranes are moist.      Dentition: Normal dentition.   Eyes:      Extraocular Movements: Extraocular movements intact.      Conjunctiva/sclera: Conjunctivae normal.      Pupils: Pupils are equal, round, and reactive to light.   Neck:      Vascular: No carotid bruit.   Cardiovascular:      Rate and Rhythm: Normal rate and regular rhythm.      Pulses:           Dorsalis pedis pulses are 1+ on the right side and 1+ on the left side.        Posterior tibial pulses are 1+ on the right side and 1+ on the left side.      Heart sounds: Normal heart sounds. No murmur (Rate is 72) heard.  Pulmonary:      Effort: Pulmonary effort is normal.      Breath sounds: Normal breath sounds.   Abdominal:      General: Abdomen is flat. There is no distension.      Palpations: Abdomen is soft. There is no mass.      Tenderness: There is no abdominal tenderness.   Genitourinary:     Rectum: Normal.   Musculoskeletal:         General: No swelling.      Cervical back: Normal  range of motion and neck supple. No muscular tenderness.      Right lower leg: No edema.      Left lower leg: No edema.      Right foot: No deformity.      Left foot: No deformity.   Feet:      Right foot:      Protective Sensation: 8 sites tested.  8 sites sensed.      Skin integrity: Skin integrity normal.      Left foot:      Protective Sensation: 8 sites tested.  8 sites sensed.      Skin integrity: Skin integrity normal.   Lymphadenopathy:      Cervical: No cervical adenopathy.   Skin:     General: Skin is warm and dry.      Capillary Refill: Capillary refill takes 2 to 3 seconds.      Findings: No rash.   Neurological:      General: No focal deficit present.      Mental Status: He is alert and oriented to person, place, and time.      Cranial Nerves: No cranial nerve deficit.      Sensory: No sensory deficit.      Motor: No weakness.      Coordination: Coordination normal.      Gait: Gait normal.      Deep Tendon Reflexes: Reflexes abnormal (Reflexes 3+).   Psychiatric:         Mood and Affect: Mood normal.         Behavior: Behavior normal.         Thought Content: Thought content normal.         Judgment: Judgment normal.          Felipe Villasenor MD

## 2024-02-08 DIAGNOSIS — F41.1 GENERALIZED ANXIETY DISORDER: Chronic | ICD-10-CM

## 2024-02-08 DIAGNOSIS — I10 ESSENTIAL HYPERTENSION: Chronic | ICD-10-CM

## 2024-02-08 DIAGNOSIS — S14.129S CENTRAL CORD SYNDROME, SEQUELA (HCC): Chronic | ICD-10-CM

## 2024-02-08 RX ORDER — OXYBUTYNIN CHLORIDE 5 MG/1
5 TABLET ORAL 2 TIMES DAILY
Qty: 180 TABLET | Refills: 1 | Status: SHIPPED | OUTPATIENT
Start: 2024-02-08

## 2024-02-08 RX ORDER — LISINOPRIL 20 MG/1
20 TABLET ORAL DAILY
Qty: 90 TABLET | Refills: 0 | Status: SHIPPED | OUTPATIENT
Start: 2024-02-08

## 2024-02-08 RX ORDER — CITALOPRAM 20 MG/1
20 TABLET ORAL DAILY
Qty: 90 TABLET | Refills: 1 | Status: SHIPPED | OUTPATIENT
Start: 2024-02-08

## 2024-03-14 ENCOUNTER — APPOINTMENT (OUTPATIENT)
Dept: LAB | Facility: HOSPITAL | Age: 74
End: 2024-03-14
Payer: MEDICARE

## 2024-03-14 DIAGNOSIS — I10 ESSENTIAL HYPERTENSION: Chronic | ICD-10-CM

## 2024-03-14 DIAGNOSIS — Z12.5 PROSTATE CANCER SCREENING: ICD-10-CM

## 2024-03-14 LAB
ALBUMIN SERPL BCP-MCNC: 4.5 G/DL (ref 3.5–5)
ANION GAP SERPL CALCULATED.3IONS-SCNC: 7 MMOL/L (ref 4–13)
BUN SERPL-MCNC: 29 MG/DL (ref 5–25)
CALCIUM SERPL-MCNC: 9.6 MG/DL (ref 8.4–10.2)
CHLORIDE SERPL-SCNC: 105 MMOL/L (ref 96–108)
CO2 SERPL-SCNC: 27 MMOL/L (ref 21–32)
CREAT SERPL-MCNC: 1.05 MG/DL (ref 0.6–1.3)
GFR SERPL CREATININE-BSD FRML MDRD: 70 ML/MIN/1.73SQ M
GLUCOSE SERPL-MCNC: 90 MG/DL (ref 65–140)
PHOSPHATE SERPL-MCNC: 3.5 MG/DL (ref 2.3–4.1)
POTASSIUM SERPL-SCNC: 4.2 MMOL/L (ref 3.5–5.3)
PSA SERPL-MCNC: 0.76 NG/ML (ref 0–4)
SODIUM SERPL-SCNC: 139 MMOL/L (ref 135–147)

## 2024-03-14 PROCEDURE — G0103 PSA SCREENING: HCPCS

## 2024-03-14 PROCEDURE — 36415 COLL VENOUS BLD VENIPUNCTURE: CPT

## 2024-03-14 PROCEDURE — 80069 RENAL FUNCTION PANEL: CPT

## 2024-05-02 ENCOUNTER — OFFICE VISIT (OUTPATIENT)
Dept: FAMILY MEDICINE CLINIC | Facility: CLINIC | Age: 74
End: 2024-05-02
Payer: MEDICARE

## 2024-05-02 VITALS
WEIGHT: 215 LBS | SYSTOLIC BLOOD PRESSURE: 120 MMHG | HEART RATE: 65 BPM | HEIGHT: 72 IN | DIASTOLIC BLOOD PRESSURE: 70 MMHG | OXYGEN SATURATION: 98 % | BODY MASS INDEX: 29.12 KG/M2

## 2024-05-02 DIAGNOSIS — F41.1 GENERALIZED ANXIETY DISORDER: Chronic | ICD-10-CM

## 2024-05-02 DIAGNOSIS — I10 ESSENTIAL HYPERTENSION: Primary | Chronic | ICD-10-CM

## 2024-05-02 DIAGNOSIS — J30.1 SEASONAL ALLERGIC RHINITIS DUE TO POLLEN: Chronic | ICD-10-CM

## 2024-05-02 DIAGNOSIS — G82.50 QUADRIPLEGIA (HCC): ICD-10-CM

## 2024-05-02 DIAGNOSIS — G62.9 NEUROPATHY: Chronic | ICD-10-CM

## 2024-05-02 PROCEDURE — 99214 OFFICE O/P EST MOD 30 MIN: CPT | Performed by: FAMILY MEDICINE

## 2024-05-02 PROCEDURE — G2211 COMPLEX E/M VISIT ADD ON: HCPCS | Performed by: FAMILY MEDICINE

## 2024-05-02 NOTE — PATIENT INSTRUCTIONS
Overall seems to be doing well.  Try to push activity as tolerated and continue current meds and follow-up

## 2024-05-02 NOTE — PROGRESS NOTES
Name: Tab Grigsby      : 1950      MRN: [id number]  Encounter Provider: Felipe Villasenor MD  Encounter Date: 2024   Encounter department: [facility] PRIMARY CARE    Assessment & Plan     1. Essential hypertension  Assessment & Plan:  Overall stable, continue current regimen      2. Neuropathy  Assessment & Plan:  Seems stable, continue current regimen and follow-up      3. Generalized anxiety disorder  Assessment & Plan:  Doing well, continue current regimen      4. Quadriplegia (HCC)  Assessment & Plan:  Does seem to be functioning well.  Push activity as tolerated      5. Seasonal allergic rhinitis due to pollen  Assessment & Plan:  Doing well, continue as needed over-the-counter meds          Depression Screening and Follow-up Plan: Patient was screened for depression during today's encounter. They screened negative with a PHQ-2 score of 0.        Subjective          Patient has history of spinal cord injury with central cord syndrome and does self catheterization to pass urine.  Also has history of hypertension diabetes mellitus adult, allergic rhinitis lower leg neuropathy, migraines, and stress disorder.  Overall has been doing well      Review of Systems   Constitutional:  Negative for activity change, appetite change, chills, fatigue, fever and unexpected weight change.   HENT:  Negative for congestion, rhinorrhea and trouble swallowing.    Eyes:  Negative for visual disturbance.   Respiratory:  Negative for apnea, cough, chest tightness and shortness of breath.    Cardiovascular:  Negative for chest pain, palpitations and leg swelling.   Gastrointestinal:  Negative for abdominal distention, abdominal pain, constipation and diarrhea.   Genitourinary:  Positive for difficulty urinating (Does self-catheterization 4-5 times a day).   Musculoskeletal:  Positive for arthralgias and gait problem. Negative for myalgias.   Skin:  Negative for rash.    Allergic/Immunologic: Positive for environmental allergies.   Neurological:  Positive for dizziness. Negative for weakness, light-headedness, numbness and headaches.   Hematological:  Negative for adenopathy.   Psychiatric/Behavioral:  Negative for agitation, dysphoric mood and sleep disturbance.        Current Outpatient Medications on File Prior to Visit   Medication Sig    citalopram (CeleXA) 20 mg tablet TAKE 1 TABLET BY MOUTH DAILY    fexofenadine (Allegra Allergy) 180 MG tablet Take 180 mg by mouth daily    glucosamine-chondroitin 500-400 MG tablet Take 1 tablet by mouth daily    hydrocortisone-acetic acid (VOSOL-HC) otic solution Administer 3 drops into both ears every 6 (six) hours As needed for itching in the ears    ketoconazole (NIZORAL) 2 % cream Apply topically daily    lisinopril (ZESTRIL) 20 mg tablet TAKE 1 TABLET BY MOUTH DAILY    LORazepam (ATIVAN) 0.5 mg tablet Take 1 tablet (0.5 mg total) by mouth daily as needed for anxiety    Multiple Vitamins-Minerals (MULTIVITAMIN ADULTS PO) Take 1 tablet by mouth daily    gabapentin (Neurontin) 300 mg capsule Take 1 capsule (300 mg total) by mouth 3 (three) times a day    meloxicam (MOBIC) 15 mg tablet Take 1 tablet (15 mg total) by mouth daily    oxybutynin (DITROPAN) 5 mg tablet TAKE 1 TABLET BY MOUTH TWICE  DAILY       Objective     Blood Pressure 120/70 (BP Location: Left arm, Patient Position: Sitting, Cuff Size: Large)   Pulse 65   Height 6' (1.829 m)   Weight 97.5 kg (215 lb)   Oxygen Saturation 98%   Body Mass Index 29.16 kg/m²     Physical Exam  Vitals and nursing note reviewed.   Constitutional:       Appearance: Normal appearance. He is well-developed.   HENT:      Head: Normocephalic.   Eyes:      Conjunctiva/sclera: Conjunctivae normal.   Neck:      Thyroid: No thyromegaly.      Vascular: No carotid bruit.   Cardiovascular:      Rate and Rhythm: Normal rate and regular rhythm.      Heart sounds: Normal heart sounds. No murmur (Rate is 72)  heard.  Pulmonary:      Effort: Pulmonary effort is normal.      Breath sounds: Normal breath sounds.   Abdominal:      General: Abdomen is flat. There is no distension.      Palpations: Abdomen is soft. There is no mass.      Tenderness: There is no abdominal tenderness.   Musculoskeletal:         General: Normal range of motion.      Cervical back: Normal range of motion and neck supple. No tenderness.      Right lower leg: No edema.      Left lower leg: No edema.   Lymphadenopathy:      Cervical: No cervical adenopathy.   Skin:     General: Skin is warm and dry.      Findings: No rash.   Neurological:      Mental Status: He is alert and oriented to person, place, and time.      Motor: No weakness.      Coordination: Coordination abnormal.      Gait: Gait abnormal.      Deep Tendon Reflexes: Reflexes abnormal (Reflexes 3-4+).   Psychiatric:         Mood and Affect: Mood normal.       Felipe Villasenor MD

## 2024-06-18 DIAGNOSIS — I10 ESSENTIAL HYPERTENSION: Chronic | ICD-10-CM

## 2024-06-18 RX ORDER — LISINOPRIL 20 MG/1
20 TABLET ORAL DAILY
Qty: 90 TABLET | Refills: 1 | Status: SHIPPED | OUTPATIENT
Start: 2024-06-18

## 2024-08-09 ENCOUNTER — OFFICE VISIT (OUTPATIENT)
Dept: FAMILY MEDICINE CLINIC | Facility: CLINIC | Age: 74
End: 2024-08-09
Payer: MEDICARE

## 2024-08-09 VITALS
WEIGHT: 215 LBS | HEIGHT: 72 IN | BODY MASS INDEX: 29.12 KG/M2 | SYSTOLIC BLOOD PRESSURE: 118 MMHG | DIASTOLIC BLOOD PRESSURE: 80 MMHG

## 2024-08-09 DIAGNOSIS — R73.09 ABNORMAL GLUCOSE: Chronic | ICD-10-CM

## 2024-08-09 DIAGNOSIS — I10 ESSENTIAL HYPERTENSION: Primary | Chronic | ICD-10-CM

## 2024-08-09 DIAGNOSIS — J30.1 SEASONAL ALLERGIC RHINITIS DUE TO POLLEN: Chronic | ICD-10-CM

## 2024-08-09 DIAGNOSIS — S14.129S CENTRAL CORD SYNDROME, SEQUELA (HCC): Chronic | ICD-10-CM

## 2024-08-09 DIAGNOSIS — F41.1 GENERALIZED ANXIETY DISORDER: Chronic | ICD-10-CM

## 2024-08-09 PROCEDURE — G2211 COMPLEX E/M VISIT ADD ON: HCPCS | Performed by: FAMILY MEDICINE

## 2024-08-09 PROCEDURE — 99214 OFFICE O/P EST MOD 30 MIN: CPT | Performed by: FAMILY MEDICINE

## 2024-08-09 NOTE — PROGRESS NOTES
"Ambulatory Visit  Name: Tab Grigsby      : 1950      MRN: [id number]  Encounter Provider: Felipe Villasenor MD  Encounter Date: 2024   Encounter department: [facility] PRIMARY CARE    Assessment & Plan   1. Essential hypertension  Assessment & Plan:  Overall stable, continue current regimen  2. Seasonal allergic rhinitis due to pollen  Assessment & Plan:  Doing well, continue as needed meds    3. Abnormal glucose  Assessment & Plan:  Continue to watch sweets and starch in diet.  Overall has been doing well  4. Generalized anxiety disorder  Assessment & Plan:  Seems stable, continue current regimen  5. Central cord syndrome, sequela (HCC)  Assessment & Plan:  Continues overall follow-up.  Try to push activity as tolerated         History of Present Illness         Patient has history of spinal cord injury with central cord syndrome and does self catheterization to pass urine.  Also has history of hypertension diabetes mellitus adult, allergic rhinitis lower leg neuropathy, migraines, and stress disorder.  Overall has been doing well      Review of Systems   Cardiovascular:  Negative for palpitations.   Endocrine: Negative for polyuria.   Genitourinary:  Positive for difficulty urinating.   Musculoskeletal:  Positive for arthralgias and back pain.   Allergic/Immunologic: Positive for environmental allergies.   Neurological:  Positive for dizziness.     Past Medical History:   Diagnosis Date    Allergic NA    Allergic rhinitis     Anxiety 1998    Arthritis     C2 spinal cord injury (HCC)     Clotting disorder (HCC) 1998    Depression     Essential hypertension     Hypertension 2018    Self-catheterizes urinary bladder     Shingles 2017    Urinary tract infection 2000    Von Willebrand disease (HCC)     Pt requires DDAVP prior to \"major\" surgery      Past Surgical History:   Procedure Laterality Date    CHOLECYSTECTOMY      FEEDING TUBE PLACEMENT      GASTROSTOMY  " 7/2000     Family History   Problem Relation Age of Onset    Diabetes Mother     Hypertension Mother     Hyperlipidemia Mother     Diabetes Father     Hypertension Father     Hyperlipidemia Father     Hyperlipidemia Maternal Grandmother      Social History     Tobacco Use    Smoking status: Former     Current packs/day: 1.00     Average packs/day: 1 pack/day for 20.0 years (20.0 total pack years)     Types: Cigarettes    Smokeless tobacco: Never   Vaping Use    Vaping status: Never Used   Substance and Sexual Activity    Alcohol use: Not Currently     Comment: Rarely     Drug use: Never    Sexual activity: Not Currently     Current Outpatient Medications on File Prior to Visit   Medication Sig    citalopram (CeleXA) 20 mg tablet TAKE 1 TABLET BY MOUTH DAILY    fexofenadine (Allegra Allergy) 180 MG tablet Take 180 mg by mouth daily    glucosamine-chondroitin 500-400 MG tablet Take 1 tablet by mouth daily    hydrocortisone-acetic acid (VOSOL-HC) otic solution Administer 3 drops into both ears every 6 (six) hours As needed for itching in the ears    ketoconazole (NIZORAL) 2 % cream Apply topically daily    lisinopril (ZESTRIL) 20 mg tablet TAKE 1 TABLET BY MOUTH DAILY    LORazepam (ATIVAN) 0.5 mg tablet Take 1 tablet (0.5 mg total) by mouth daily as needed for anxiety    Multiple Vitamins-Minerals (MULTIVITAMIN ADULTS PO) Take 1 tablet by mouth daily    oxybutynin (DITROPAN) 5 mg tablet TAKE 1 TABLET BY MOUTH TWICE  DAILY    gabapentin (Neurontin) 300 mg capsule Take 1 capsule (300 mg total) by mouth 3 (three) times a day    meloxicam (MOBIC) 15 mg tablet Take 1 tablet (15 mg total) by mouth daily     Allergies   Allergen Reactions    Penicillins Other (See Comments)     RASH, hives; valentín PIP 5/22/01 jgo    Sulfamethoxazole-Trimethoprim Other (See Comments)     developed rash     Immunization History   Administered Date(s) Administered    COVID-19 PFIZER VACCINE 0.3 ML IM 03/24/2021, 12/08/2021    COVID-19 Pfizer vac  (Grover-sucrose, gray cap) 12 yr+ IM 05/18/2022    COVID-19, unspecified 03/03/2021    INFLUENZA 09/27/2001, 10/31/2002, 10/25/2006, 11/20/2007, 11/25/2008, 12/07/2009, 11/30/2010, 09/16/2011, 10/02/2013, 11/21/2014, 09/30/2015, 11/18/2016, 12/13/2017, 10/23/2018    Influenza, high dose seasonal 0.7 mL 11/15/2019, 12/09/2020, 09/15/2021, 09/26/2022, 10/23/2023    Pneumococcal Polysaccharide PPV23 08/20/2014    Tdap 05/16/2014     Objective     Blood Pressure 118/80 (BP Location: Left arm, Patient Position: Sitting, Cuff Size: Standard)   Height 6' (1.829 m)   Weight 97.5 kg (215 lb)   Body Mass Index 29.16 kg/m²     Physical Exam  Cardiovascular:      Heart sounds: No murmur heard.  Neurological:      Motor: No weakness.      Coordination: Coordination abnormal.      Gait: Gait abnormal.      Deep Tendon Reflexes: Reflexes abnormal.   Psychiatric:         Mood and Affect: Mood normal.          Effort: Pulmonary effort is normal. No respiratory distress.      Breath sounds: Normal breath sounds.   Abdominal:      General: Abdomen is flat.      Palpations: Abdomen is soft.      Tenderness: There is no abdominal tenderness.   Musculoskeletal:         General: No swelling.      Cervical back: Neck supple. No tenderness.      Right lower leg: No edema.      Left lower leg: No edema.   Skin:     General: Skin is warm and dry.      Capillary Refill: Capillary refill takes less than 2 seconds.      Findings: No rash.   Neurological:      Mental Status: He is alert.      Motor: No weakness.      Coordination: Coordination abnormal.      Gait: Gait abnormal.      Deep Tendon Reflexes: Reflexes abnormal (Reflexes 3+).   Psychiatric:         Mood and Affect: Mood normal.

## 2024-08-09 NOTE — PATIENT INSTRUCTIONS
Overall seems to be doing well.  Try to get some daily physical activity.  Will continue on all medications as is

## 2024-09-05 ENCOUNTER — HOSPITAL ENCOUNTER (EMERGENCY)
Facility: HOSPITAL | Age: 74
Discharge: HOME | End: 2024-09-05
Attending: STUDENT IN AN ORGANIZED HEALTH CARE EDUCATION/TRAINING PROGRAM
Payer: MEDICARE

## 2024-09-05 VITALS
OXYGEN SATURATION: 94 % | TEMPERATURE: 99 F | RESPIRATION RATE: 18 BRPM | HEART RATE: 85 BPM | WEIGHT: 219.8 LBS | BODY MASS INDEX: 29.81 KG/M2 | DIASTOLIC BLOOD PRESSURE: 85 MMHG | SYSTOLIC BLOOD PRESSURE: 147 MMHG

## 2024-09-05 DIAGNOSIS — R04.0 RIGHT-SIDED EPISTAXIS: Primary | ICD-10-CM

## 2024-09-05 PROCEDURE — 99284 EMERGENCY DEPT VISIT MOD MDM: CPT | Performed by: PHYSICIAN ASSISTANT

## 2024-09-05 PROCEDURE — 99282 EMERGENCY DEPT VISIT SF MDM: CPT

## 2024-09-05 PROCEDURE — 30903 CONTROL OF NOSEBLEED: CPT | Performed by: PHYSICIAN ASSISTANT

## 2024-09-05 RX ORDER — TRANEXAMIC ACID 100 MG/ML
500 INJECTION, SOLUTION INTRAVENOUS ONCE
Status: COMPLETED | OUTPATIENT
Start: 2024-09-05 | End: 2024-09-05

## 2024-09-05 RX ADMIN — SILVER NITRATE APPLICATORS 1 APPLICATOR: 25; 75 STICK TOPICAL at 20:15

## 2024-09-05 RX ADMIN — TRANEXAMIC ACID 500 MG: 1 INJECTION, SOLUTION INTRAVENOUS at 20:56

## 2024-09-05 NOTE — ED PROVIDER NOTES
History  Chief Complaint   Patient presents with    Nose Bleed     Pt stating he blew his nose approximately 30 minutes pta and had a spontaneous nose bleed.  No blood thinners and hx of Von Willebrand's.  Pt has nose plug at intake.     73-year-old male presents emergency department for evaluation of right-sided nosebleed.  Patient states approximately 30 minutes prior to arrival he blew his nose and started with a nosebleed.  States he has been having difficulty controlling the bleeding since.  Patient with past medical history of von Willebrand's.  Not currently on any medications for this.  Denies any trauma to the nose or face. He reports he has been congested recently and believes this is due to allergies.         Prior to Admission Medications   Prescriptions Last Dose Informant Patient Reported? Taking?   LORazepam (ATIVAN) 0.5 mg tablet   No No   Sig: Take 1 tablet (0.5 mg total) by mouth daily as needed for anxiety   Multiple Vitamins-Minerals (MULTIVITAMIN ADULTS PO)   Yes No   Sig: Take 1 tablet by mouth daily   citalopram (CeleXA) 20 mg tablet   No No   Sig: TAKE 1 TABLET BY MOUTH DAILY   fexofenadine (Allegra Allergy) 180 MG tablet   Yes No   Sig: Take 180 mg by mouth daily   gabapentin (Neurontin) 300 mg capsule   No No   Sig: Take 1 capsule (300 mg total) by mouth 3 (three) times a day   glucosamine-chondroitin 500-400 MG tablet   Yes No   Sig: Take 1 tablet by mouth daily   hydrocortisone-acetic acid (VOSOL-HC) otic solution   No No   Sig: Administer 3 drops into both ears every 6 (six) hours As needed for itching in the ears   ketoconazole (NIZORAL) 2 % cream   No No   Sig: Apply topically daily   lisinopril (ZESTRIL) 20 mg tablet   No No   Sig: TAKE 1 TABLET BY MOUTH DAILY   meloxicam (MOBIC) 15 mg tablet   No No   Sig: Take 1 tablet (15 mg total) by mouth daily   oxybutynin (DITROPAN) 5 mg tablet   No No   Sig: TAKE 1 TABLET BY MOUTH TWICE  DAILY      Facility-Administered Medications: None  "      Past Medical History:   Diagnosis Date    Allergic NA    Allergic rhinitis     Anxiety 7/1998    Arthritis     C2 spinal cord injury (HCC)     Clotting disorder (HCC) 7/1998    Depression     Essential hypertension     Hypertension 7/2018    Self-catheterizes urinary bladder     Shingles 7/2017    Urinary tract infection 7/2000    Von Willebrand disease (HCC)     Pt requires DDAVP prior to \"major\" surgery        Past Surgical History:   Procedure Laterality Date    CHOLECYSTECTOMY      FEEDING TUBE PLACEMENT      GASTROSTOMY  7/2000       Family History   Problem Relation Age of Onset    Diabetes Mother     Hypertension Mother     Hyperlipidemia Mother     Diabetes Father     Hypertension Father     Hyperlipidemia Father     Hyperlipidemia Maternal Grandmother      I have reviewed and agree with the history as documented.    E-Cigarette/Vaping    E-Cigarette Use Never User      E-Cigarette/Vaping Substances    Nicotine No     THC No     CBD No     Flavoring No     Other No     Unknown No      Social History     Tobacco Use    Smoking status: Former     Current packs/day: 1.00     Average packs/day: 1 pack/day for 20.0 years (20.0 ttl pk-yrs)     Types: Cigarettes    Smokeless tobacco: Never   Vaping Use    Vaping status: Never Used   Substance Use Topics    Alcohol use: Not Currently     Comment: Rarely     Drug use: Never       Review of Systems   Constitutional: Negative.    HENT:  Positive for nosebleeds.    Respiratory: Negative.     Cardiovascular: Negative.    Gastrointestinal: Negative.    Musculoskeletal: Negative.    Skin: Negative.    Neurological: Negative.    All other systems reviewed and are negative.      Physical Exam  Physical Exam  Vitals and nursing note reviewed.   Constitutional:       General: He is not in acute distress.     Appearance: Normal appearance. He is not ill-appearing, toxic-appearing or diaphoretic.   HENT:      Head: Normocephalic.      Nose:      Right Nostril: Epistaxis " "present.      Comments: Anterior right sided epistaxis. No hematoma.  No foreign body.     Mouth/Throat:      Comments: Blood in the posterior oropharynx  Eyes:      Conjunctiva/sclera: Conjunctivae normal.   Pulmonary:      Effort: Pulmonary effort is normal.   Skin:     General: Skin is warm and dry.   Neurological:      General: No focal deficit present.      Mental Status: He is alert.         Vital Signs  ED Triage Vitals [09/05/24 1925]   Temperature Pulse Respirations Blood Pressure SpO2   99 °F (37.2 °C) 85 18 147/85 94 %      Temp Source Heart Rate Source Patient Position - Orthostatic VS BP Location FiO2 (%)   Temporal Monitor Sitting Right arm --      Pain Score       No Pain           Vitals:    09/05/24 1925   BP: 147/85   Pulse: 85   Patient Position - Orthostatic VS: Sitting         Visual Acuity      ED Medications  Medications   silver nitrate-potassium nitrate (ARZOL SILVER NITRATE) 75-25 % applicator 1 applicator (1 applicator Topical Given by Other 9/5/24 2015)   tranexamic acid 100mg/mL (for epistaxis) 500 mg (500 mg Nasal Given 9/5/24 2056)       Diagnostic Studies  Results Reviewed       None                   No orders to display              Procedures  Epistaxis management    Date/Time: 9/5/2024 8:20 PM    Performed by: Keara Carrasco PA-C  Authorized by: Keara Carrasco PA-C  Universal Protocol:  procedure performed by consultantConsent: Verbal consent obtained.  Risks and benefits: risks, benefits and alternatives were discussed  Consent given by: patient  Time out: Immediately prior to procedure a \"time out\" was called to verify the correct patient, procedure, equipment, support staff and site/side marked as required.  Timeout called at: 9/5/2024 8:20 PM.  Patient understanding: patient states understanding of the procedure being performed  Patient consent: the patient's understanding of the procedure matches consent given  Patient identity confirmed: verbally with patient and arm " band    Patient location:  ED  Anesthesia (see MAR for exact dosages):     Anesthesia method:  None  Procedure details:     Treatment site:  R anterior    Hemostasis method:  Rhino rocket    Approach:  External    Treatment complexity:  Limited    Treatment episode: initial    Post-procedure details:     Assessment:  Bleeding stopped    Patient tolerance of procedure:  Tolerated well, no immediate complications           ED Course  ED Course as of 09/06/24 0917   Thu Sep 05, 2024 2014 Attempted cauterization without success. This caused the patient to sneeze multiple times and the bleeding quickly restarted.  Will attempt Rhino Rocket   2029 Rhino Rocket soaked in TXA  placed   2047 Nasal bleeding controlled with Rhino Rocket.  Patient has no blood in the posterior oropharynx at this time.  Discussed this with the family including symptomatic treatment strict return precautions and follow-up and both verbalized understanding.  Patient is clinically and hemodynamically stable for discharge                                 SBIRT 22yo+      Flowsheet Row Most Recent Value   Initial Alcohol Screen: US AUDIT-C     1. How often do you have a drink containing alcohol? 0 Filed at: 09/05/2024 1926   2. How many drinks containing alcohol do you have on a typical day you are drinking?  0 Filed at: 09/05/2024 1926   3b. FEMALE Any Age, or MALE 65+: How often do you have 4 or more drinks on one occassion? 0 Filed at: 09/05/2024 1926   Audit-C Score 0 Filed at: 09/05/2024 1926   DARSHAN: How many times in the past year have you...    Used an illegal drug or used a prescription medication for non-medical reasons? Never Filed at: 09/05/2024 1926                      Medical Decision Making  73-year-old male presents to the emergency department for evaluation of sided epistaxis status post blowing his nose.  Vitals and medical record reviewed.  Patient risk for the following but not limited to posterior vs anterior epistaxis, septal  hematoma, nasal fracture.  Patient denies trauma or injury, lower concern for nasal fracture.  No evidence of septal hematoma on exam.  Patient did have anterior epistaxis on exam which was ultimately controlled with TXA soaked Rhino Rocket.  Patient was instructed to follow-up with ENT.  He verbalized understanding.  He is clinically hemodynamically stable for discharge    Risk  Prescription drug management.                 Disposition  Final diagnoses:   Right-sided epistaxis     Time reflects when diagnosis was documented in both MDM as applicable and the Disposition within this note       Time User Action Codes Description Comment    9/5/2024  8:48 PM ChingKeara bradshaw Add [R04.0] Right-sided epistaxis           ED Disposition       ED Disposition   Discharge    Condition   Stable    Date/Time   Thu Sep 5, 2024 2048    Comment   Tab Grigsby discharge to home/self care.                   Follow-up Information       Follow up With Specialties Details Why Contact Info    Felipe Villasenor MD Family Medicine   [address] [facility] [address]  [phone]      Nelly Finn MD Otolaryngology   [address] [place] [id number]  [phone]              Discharge Medication List as of 9/5/2024  8:51 PM        CONTINUE these medications which have NOT CHANGED    Details   citalopram (CeleXA) 20 mg tablet TAKE 1 TABLET BY MOUTH DAILY, Starting Thu 2/8/2024, Normal      fexofenadine (Allegra Allergy) 180 MG tablet Take 180 mg by mouth daily, Historical Med      gabapentin (Neurontin) 300 mg capsule Take 1 capsule (300 mg total) by mouth 3 (three) times a day, Starting Wed 2/19/2020, Until Thu 8/18/2022, Normal      glucosamine-chondroitin 500-400 MG tablet Take 1 tablet by mouth daily, Historical Med      hydrocortisone-acetic acid (VOSOL-HC) otic solution Administer 3 drops into both ears every 6 (six) hours As needed for itching in the ears, Starting Tue 12/20/2022, Normal      ketoconazole  (NIZORAL) 2 % cream Apply topically daily, Starting Mon 9/26/2022, Normal      lisinopril (ZESTRIL) 20 mg tablet TAKE 1 TABLET BY MOUTH DAILY, Starting Tue 6/18/2024, Normal      LORazepam (ATIVAN) 0.5 mg tablet Take 1 tablet (0.5 mg total) by mouth daily as needed for anxiety, Starting Mon 3/20/2023, Normal      meloxicam (MOBIC) 15 mg tablet Take 1 tablet (15 mg total) by mouth daily, Starting Wed 10/12/2022, Until Thu 10/12/2023, Normal      Multiple Vitamins-Minerals (MULTIVITAMIN ADULTS PO) Take 1 tablet by mouth daily, Historical Med      oxybutynin (DITROPAN) 5 mg tablet TAKE 1 TABLET BY MOUTH TWICE  DAILY, Starting Thu 2/8/2024, Normal                 PDMP Review         Value Time User    PDMP Reviewed  Yes 3/20/2023 12:20 PM Felipe Villasenor MD            ED Provider  Electronically Signed by             Keara Carrasco PA-C  09/06/24 0917

## 2024-09-06 NOTE — DISCHARGE INSTRUCTIONS
Please leave Rhino Rocket in until this is removed in 48 to a maximum of 72 hours.  Please follow-up with our ENT specialist, referral has been placed for you.  If you are unable to get into them within this timeframe please follow-up with your family doctor return to the emergency department to have this removed.  Please return to the emergency department sooner with any new or worsening symptoms.

## 2024-09-07 ENCOUNTER — APPOINTMENT (OUTPATIENT)
Dept: LAB | Facility: HOSPITAL | Age: 74
End: 2024-09-07
Payer: MEDICARE

## 2024-09-07 DIAGNOSIS — D68.00 VON WILLEBRAND DISEASE (HCC): ICD-10-CM

## 2024-09-07 DIAGNOSIS — R04.0 EPISTAXIS: ICD-10-CM

## 2024-09-07 LAB
APTT PPP: 45 SECONDS (ref 23–34)
ERYTHROCYTE [DISTWIDTH] IN BLOOD BY AUTOMATED COUNT: 13.2 % (ref 11.6–15.1)
HCT VFR BLD AUTO: 42 % (ref 36.5–49.3)
HGB BLD-MCNC: 14.5 G/DL (ref 12–17)
INR PPP: 1.02 (ref 0.85–1.19)
MCH RBC QN AUTO: 30.5 PG (ref 26.8–34.3)
MCHC RBC AUTO-ENTMCNC: 34.5 G/DL (ref 31.4–37.4)
MCV RBC AUTO: 88 FL (ref 82–98)
PLATELET # BLD AUTO: 132 THOUSANDS/UL (ref 149–390)
PMV BLD AUTO: 9.6 FL (ref 8.9–12.7)
PROTHROMBIN TIME: 13.8 SECONDS (ref 12.3–15)
RBC # BLD AUTO: 4.75 MILLION/UL (ref 3.88–5.62)
WBC # BLD AUTO: 6.03 THOUSAND/UL (ref 4.31–10.16)

## 2024-09-07 PROCEDURE — 85027 COMPLETE CBC AUTOMATED: CPT

## 2024-09-07 PROCEDURE — 85730 THROMBOPLASTIN TIME PARTIAL: CPT

## 2024-09-07 PROCEDURE — 36415 COLL VENOUS BLD VENIPUNCTURE: CPT

## 2024-09-07 PROCEDURE — 85610 PROTHROMBIN TIME: CPT

## 2024-09-17 DIAGNOSIS — F41.1 GENERALIZED ANXIETY DISORDER: Chronic | ICD-10-CM

## 2024-09-17 DIAGNOSIS — S14.129S CENTRAL CORD SYNDROME, SEQUELA (HCC): Chronic | ICD-10-CM

## 2024-09-18 RX ORDER — OXYBUTYNIN CHLORIDE 5 MG/1
5 TABLET ORAL 2 TIMES DAILY
Qty: 180 TABLET | Refills: 1 | Status: SHIPPED | OUTPATIENT
Start: 2024-09-18

## 2024-09-18 RX ORDER — CITALOPRAM HYDROBROMIDE 20 MG/1
20 TABLET ORAL DAILY
Qty: 90 TABLET | Refills: 1 | Status: SHIPPED | OUTPATIENT
Start: 2024-09-18

## 2024-11-08 ENCOUNTER — TELEPHONE (OUTPATIENT)
Dept: FAMILY MEDICINE CLINIC | Facility: CLINIC | Age: 74
End: 2024-11-08

## 2024-11-08 NOTE — TELEPHONE ENCOUNTER
Attempted to reach Tab to reschedule his appointment with Dr. Villasenor on 11/15/2024, due to him being out of office that week. Patient didn't answer/ left a voicemail.

## 2024-11-12 NOTE — ASSESSMENT & PLAN NOTE
Doing well, continue current meds and follow-up no overt sxs of bleeding noted no overt sxs of bleeding noted from rectum, scant blood tinged secretions noted with trach suctioning x1. patient intermittently tachycardic

## 2024-11-28 DIAGNOSIS — I10 ESSENTIAL HYPERTENSION: Chronic | ICD-10-CM

## 2024-11-28 RX ORDER — LISINOPRIL 20 MG/1
20 TABLET ORAL DAILY
Qty: 90 TABLET | Refills: 1 | Status: SHIPPED | OUTPATIENT
Start: 2024-11-28

## 2024-12-04 ENCOUNTER — OFFICE VISIT (OUTPATIENT)
Dept: FAMILY MEDICINE CLINIC | Facility: CLINIC | Age: 74
End: 2024-12-04
Payer: MEDICARE

## 2024-12-04 VITALS
WEIGHT: 214.2 LBS | BODY MASS INDEX: 29.01 KG/M2 | HEIGHT: 72 IN | SYSTOLIC BLOOD PRESSURE: 110 MMHG | DIASTOLIC BLOOD PRESSURE: 78 MMHG

## 2024-12-04 DIAGNOSIS — G62.9 NEUROPATHY: Chronic | ICD-10-CM

## 2024-12-04 DIAGNOSIS — D68.00 VON WILLEBRAND'S DISEASE (HCC): Chronic | ICD-10-CM

## 2024-12-04 DIAGNOSIS — F41.1 GENERALIZED ANXIETY DISORDER: Chronic | ICD-10-CM

## 2024-12-04 DIAGNOSIS — S14.129S CENTRAL CORD SYNDROME, SEQUELA (HCC): Chronic | ICD-10-CM

## 2024-12-04 DIAGNOSIS — G82.50 QUADRIPLEGIA (HCC): ICD-10-CM

## 2024-12-04 DIAGNOSIS — I10 ESSENTIAL HYPERTENSION: Primary | Chronic | ICD-10-CM

## 2024-12-04 DIAGNOSIS — R73.09 ABNORMAL GLUCOSE: Chronic | ICD-10-CM

## 2024-12-04 DIAGNOSIS — Z23 ENCOUNTER FOR IMMUNIZATION: ICD-10-CM

## 2024-12-04 PROCEDURE — 90471 IMMUNIZATION ADMIN: CPT | Performed by: FAMILY MEDICINE

## 2024-12-04 PROCEDURE — 99214 OFFICE O/P EST MOD 30 MIN: CPT | Performed by: FAMILY MEDICINE

## 2024-12-04 PROCEDURE — 90662 IIV NO PRSV INCREASED AG IM: CPT | Performed by: FAMILY MEDICINE

## 2024-12-04 NOTE — PROGRESS NOTES
Name: Tab Grigsby      : 1950      MRN: [id number]  Encounter Provider: Felipe Villasenor MD  Encounter Date: 2024   Encounter department: [facility] PRIMARY CARE    Assessment & Plan  Encounter for immunization    Orders:    influenza vaccine, high-dose, PF 0.5 mL (Fluzone High Dose)    Essential hypertension  Overall stable, continue current regimen       Generalized anxiety disorder  Seems to be doing well, continue current regimen       Neuropathy  Doing well.  Continue baseline meds       Quadriplegia (HCC)  Seems to be functioning well.  Continue overall follow-up and push activity as tolerated       Central cord syndrome, sequela (HCC)  Does do self-catheterization 4 times a day       Abnormal glucose  Watch sweets and starch in diet            History of Present Illness       Patient has history of spinal cord injury with central cord syndrome and does self catheterization to pass urine.  Also has history of hypertension diabetes mellitus adult, allergic rhinitis lower leg neuropathy, migraines, and stress disorder.  Overall has been doing well      Review of Systems   Constitutional:  Negative for activity change, appetite change, chills, fatigue, fever and unexpected weight change.   HENT:  Negative for rhinorrhea and trouble swallowing.    Eyes: Negative.  Negative for visual disturbance.   Respiratory:  Negative for apnea, cough, chest tightness and shortness of breath.    Cardiovascular:  Negative for chest pain, palpitations and leg swelling.   Gastrointestinal:  Negative for abdominal distention, abdominal pain, constipation and diarrhea.   Genitourinary:  Positive for difficulty urinating (Does self catheterizations 4 times a day). Negative for hematuria.   Musculoskeletal:  Negative for arthralgias and myalgias.   Skin:  Negative for color change and rash.   Allergic/Immunologic: Negative for environmental allergies.   Neurological:  Negative for  "dizziness, weakness, light-headedness, numbness and headaches.   Hematological:  Negative for adenopathy.   Psychiatric/Behavioral:  Negative for agitation and sleep disturbance.    All other systems reviewed and are negative.    Past Medical History:   Diagnosis Date    Allergic NA    Allergic rhinitis     Anxiety 7/1998    Arthritis     C2 spinal cord injury (HCC)     Clotting disorder (HCC) 7/1998    Depression     Essential hypertension     Hypertension 7/2018    Self-catheterizes urinary bladder     Shingles 7/2017    Urinary tract infection 7/2000    Von Willebrand disease (HCC)     Pt requires DDAVP prior to \"major\" surgery      Past Surgical History:   Procedure Laterality Date    CHOLECYSTECTOMY      FEEDING TUBE PLACEMENT      GASTROSTOMY  7/2000     Family History   Problem Relation Age of Onset    Diabetes Mother     Hypertension Mother     Hyperlipidemia Mother     Diabetes Father     Hypertension Father     Hyperlipidemia Father     Hyperlipidemia Maternal Grandmother      Social History     Tobacco Use    Smoking status: Former     Current packs/day: 1.00     Average packs/day: 1 pack/day for 20.0 years (20.0 total pack years)     Types: Cigarettes    Smokeless tobacco: Never   Vaping Use    Vaping status: Never Used   Substance and Sexual Activity    Alcohol use: Not Currently     Comment: Rarely     Drug use: Never    Sexual activity: Not Currently     Current Outpatient Medications on File Prior to Visit   Medication Sig    citalopram (CeleXA) 20 mg tablet TAKE 1 TABLET BY MOUTH DAILY    fexofenadine (Allegra Allergy) 180 MG tablet Take 180 mg by mouth daily    gabapentin (Neurontin) 300 mg capsule Take 1 capsule (300 mg total) by mouth 3 (three) times a day    glucosamine-chondroitin 500-400 MG tablet Take 1 tablet by mouth daily    hydrocortisone-acetic acid (VOSOL-HC) otic solution Administer 3 drops into both ears every 6 (six) hours As needed for itching in the ears    ketoconazole (NIZORAL) 2 " % cream Apply topically daily    lisinopril (ZESTRIL) 20 mg tablet TAKE 1 TABLET BY MOUTH DAILY    LORazepam (ATIVAN) 0.5 mg tablet Take 1 tablet (0.5 mg total) by mouth daily as needed for anxiety    meloxicam (MOBIC) 15 mg tablet Take 1 tablet (15 mg total) by mouth daily    Multiple Vitamins-Minerals (MULTIVITAMIN ADULTS PO) Take 1 tablet by mouth daily    oxybutynin (DITROPAN) 5 mg tablet TAKE 1 TABLET BY MOUTH TWICE  DAILY     Allergies   Allergen Reactions    Penicillins Other (See Comments)     RASH, hives; valentín PIP 5/22/01 jgo    Sulfamethoxazole-Trimethoprim Other (See Comments)     developed rash     Immunization History   Administered Date(s) Administered    COVID-19 PFIZER VACCINE 0.3 ML IM 03/24/2021, 12/08/2021    COVID-19 Pfizer vac (Grover-sucrose, gray cap) 12 yr+ IM 05/18/2022    COVID-19, unspecified 03/03/2021    INFLUENZA 09/27/2001, 10/31/2002, 10/25/2006, 11/20/2007, 11/25/2008, 12/07/2009, 11/30/2010, 09/16/2011, 10/02/2013, 11/21/2014, 09/30/2015, 11/18/2016, 12/13/2017, 10/23/2018    Influenza Split High Dose Preservative Free IM 12/04/2024    Influenza, high dose seasonal 0.7 mL 11/15/2019, 12/09/2020, 09/15/2021, 09/26/2022, 10/23/2023    Pneumococcal Polysaccharide PPV23 08/20/2014    Tdap 05/16/2014     Objective   Blood Pressure 110/78 (BP Location: Left arm, Patient Position: Sitting, Cuff Size: Large)   Height 6' (1.829 m)   Weight 97.2 kg (214 lb 3.2 oz)   Body Mass Index 29.05 kg/m²     Physical Exam  Vitals and nursing note reviewed.   Constitutional:       General: He is not in acute distress.     Appearance: Normal appearance. He is well-developed.   HENT:      Head: Normocephalic and atraumatic.   Eyes:      Conjunctiva/sclera: Conjunctivae normal.   Neck:      Vascular: No carotid bruit.   Cardiovascular:      Rate and Rhythm: Normal rate and regular rhythm.      Heart sounds: No murmur (Rate is 66) heard.  Pulmonary:      Effort: Pulmonary effort is normal. No respiratory  distress.      Breath sounds: Normal breath sounds.   Abdominal:      General: Abdomen is flat.      Palpations: Abdomen is soft. There is no mass.      Tenderness: There is no abdominal tenderness.   Musculoskeletal:         General: No swelling.      Cervical back: Neck supple. No tenderness.   Skin:     General: Skin is warm and dry.      Capillary Refill: Capillary refill takes less than 2 seconds.      Findings: No rash.   Neurological:      Mental Status: He is alert.      Motor: No weakness.      Coordination: Coordination normal.      Gait: Gait abnormal.      Deep Tendon Reflexes: Reflexes abnormal (Reflexes 4+ without clonus).   Psychiatric:         Mood and Affect: Mood normal.

## 2024-12-04 NOTE — PATIENT INSTRUCTIONS
Overall seems to be doing well.  Try to push daily physical activity.  Will give flu shot today.  Continue baseline meds and follow-up

## 2025-01-30 ENCOUNTER — OFFICE VISIT (OUTPATIENT)
Dept: FAMILY MEDICINE CLINIC | Facility: CLINIC | Age: 75
End: 2025-01-30
Payer: MEDICARE

## 2025-01-30 VITALS
OXYGEN SATURATION: 93 % | SYSTOLIC BLOOD PRESSURE: 122 MMHG | HEIGHT: 72 IN | HEART RATE: 79 BPM | TEMPERATURE: 98.1 F | DIASTOLIC BLOOD PRESSURE: 76 MMHG | WEIGHT: 213.6 LBS | BODY MASS INDEX: 28.93 KG/M2

## 2025-01-30 DIAGNOSIS — L30.9 DERMATITIS: ICD-10-CM

## 2025-01-30 DIAGNOSIS — G82.50 QUADRIPLEGIA (HCC): ICD-10-CM

## 2025-01-30 DIAGNOSIS — J01.00 ACUTE NON-RECURRENT MAXILLARY SINUSITIS: Primary | ICD-10-CM

## 2025-01-30 PROCEDURE — 99214 OFFICE O/P EST MOD 30 MIN: CPT | Performed by: FAMILY MEDICINE

## 2025-01-30 PROCEDURE — G2211 COMPLEX E/M VISIT ADD ON: HCPCS | Performed by: FAMILY MEDICINE

## 2025-01-30 RX ORDER — LEVOFLOXACIN 500 MG/1
500 TABLET, FILM COATED ORAL EVERY 24 HOURS
Qty: 7 TABLET | Refills: 0 | Status: SHIPPED | OUTPATIENT
Start: 2025-01-30 | End: 2025-02-06

## 2025-01-30 RX ORDER — KETOCONAZOLE 20 MG/G
CREAM TOPICAL DAILY
Qty: 30 G | Refills: 1 | Status: SHIPPED | OUTPATIENT
Start: 2025-01-30

## 2025-01-30 NOTE — ASSESSMENT & PLAN NOTE
I do not feel that anything further can be done for the spasm.  Does continue follow-up for this

## 2025-01-30 NOTE — PROGRESS NOTES
Name: Tab Grigsby      : 1950      MRN: [id number]  Encounter Provider: Felipe Villasenor MD  Encounter Date: 2025   Encounter department: [facility] PRIMARY CARE    Assessment & Plan  Acute non-recurrent maxillary sinusitis  Discussed problem.  Should use the saline nasal rinse at bedtime and in the morning I will place on 1 week course of Levaquin 500 mg daily  Orders:    levofloxacin (LEVAQUIN) 500 mg tablet; Take 1 tablet (500 mg total) by mouth every 24 hours for 7 days    Quadriplegia (HCC)  I do not feel that anything further can be done for the spasm.  Does continue follow-up for this       Dermatitis  Will try using Nizoral cream once daily x 7 to 10 days as needed  Orders:    ketoconazole (NIZORAL) 2 % cream; Apply topically daily         History of Present Illness         Patient has history of spinal cord injury with central cord syndrome and does self catheterization to pass urine.  Also has history of hypertension diabetes mellitus adult, allergic rhinitis lower leg neuropathy, migraines, and stress disorder.  Seen today for several day history of cough and congestion now getting discolored mucus and intermittent wheezing.  Also has been having trouble with itching in the palm of the hand on the left which because of the spastic disorder usually remains clasped    Hand Pain       Review of Systems   Constitutional:  Negative for fever.   HENT:  Positive for congestion and rhinorrhea.    Respiratory:  Positive for cough and wheezing.    Gastrointestinal:  Negative for abdominal pain and nausea.   Allergic/Immunologic: Positive for environmental allergies.   Psychiatric/Behavioral:  Positive for sleep disturbance.      Past Medical History:   Diagnosis Date    Allergic NA    Allergic rhinitis     Anxiety 1998    Arthritis     C2 spinal cord injury (HCC)     Clotting disorder (HCC) 1998    Depression     Essential hypertension     Hypertension 2018     "Self-catheterizes urinary bladder     Shingles 7/2017    Urinary tract infection 7/2000    Von Willebrand disease (HCC)     Pt requires DDAVP prior to \"major\" surgery      Past Surgical History:   Procedure Laterality Date    CHOLECYSTECTOMY      FEEDING TUBE PLACEMENT      GASTROSTOMY  7/2000     Family History   Problem Relation Age of Onset    Diabetes Mother     Hypertension Mother     Hyperlipidemia Mother     Diabetes Father     Hypertension Father     Hyperlipidemia Father     Hyperlipidemia Maternal Grandmother      Social History     Tobacco Use    Smoking status: Former     Current packs/day: 1.00     Average packs/day: 1 pack/day for 20.0 years (20.0 total pack years)     Types: Cigarettes    Smokeless tobacco: Never   Vaping Use    Vaping status: Never Used   Substance and Sexual Activity    Alcohol use: Not Currently     Comment: Rarely     Drug use: Never    Sexual activity: Not Currently     Current Outpatient Medications on File Prior to Visit   Medication Sig    citalopram (CeleXA) 20 mg tablet TAKE 1 TABLET BY MOUTH DAILY    fexofenadine (Allegra Allergy) 180 MG tablet Take 180 mg by mouth daily    gabapentin (Neurontin) 300 mg capsule Take 1 capsule (300 mg total) by mouth 3 (three) times a day    glucosamine-chondroitin 500-400 MG tablet Take 1 tablet by mouth daily    lisinopril (ZESTRIL) 20 mg tablet TAKE 1 TABLET BY MOUTH DAILY    LORazepam (ATIVAN) 0.5 mg tablet Take 1 tablet (0.5 mg total) by mouth daily as needed for anxiety    meloxicam (MOBIC) 15 mg tablet Take 1 tablet (15 mg total) by mouth daily    Multiple Vitamins-Minerals (MULTIVITAMIN ADULTS PO) Take 1 tablet by mouth daily    oxybutynin (DITROPAN) 5 mg tablet TAKE 1 TABLET BY MOUTH TWICE  DAILY    hydrocortisone-acetic acid (VOSOL-HC) otic solution Administer 3 drops into both ears every 6 (six) hours As needed for itching in the ears (Patient not taking: Reported on 1/30/2025)    ketoconazole (NIZORAL) 2 % cream Apply topically " daily (Patient not taking: Reported on 1/30/2025)     Allergies   Allergen Reactions    Penicillins Other (See Comments)     RASH, hives; valentín PIP 5/22/01 jgo    Sulfamethoxazole-Trimethoprim Other (See Comments)     developed rash     Immunization History   Administered Date(s) Administered    COVID-19 PFIZER VACCINE 0.3 ML IM 03/24/2021, 12/08/2021    COVID-19 Pfizer vac (Grover-sucrose, gray cap) 12 yr+ IM 05/18/2022    COVID-19, unspecified 03/03/2021    INFLUENZA 09/27/2001, 10/31/2002, 10/25/2006, 11/20/2007, 11/25/2008, 12/07/2009, 11/30/2010, 09/16/2011, 10/02/2013, 11/21/2014, 09/30/2015, 11/18/2016, 12/13/2017, 10/23/2018    Influenza Split High Dose Preservative Free IM 12/04/2024    Influenza, high dose seasonal 0.7 mL 11/15/2019, 12/09/2020, 09/15/2021, 09/26/2022, 10/23/2023    Pneumococcal Polysaccharide PPV23 08/20/2014    Tdap 05/16/2014     Objective   Blood Pressure 122/76 (BP Location: Left arm, Patient Position: Sitting, Cuff Size: Standard)   Pulse 79   Temperature 98.1 °F (36.7 °C) (Oral)   Height 6' (1.829 m)   Weight 96.9 kg (213 lb 9.6 oz)   Oxygen Saturation 93%   Body Mass Index 28.97 kg/m²     Physical Exam     Normal breath sounds.   Abdominal:      General: Abdomen is flat.      Palpations: Abdomen is soft. There is no mass.      Tenderness: There is no abdominal tenderness.   Musculoskeletal:         General: No swelling.      Cervical back: Neck supple. No tenderness.   Lymphadenopathy:      Cervical: No cervical adenopathy.   Skin:     General: Skin is warm and dry.      Capillary Refill: Capillary refill takes less than 2 seconds.          Neurological:      Mental Status: He is alert.      Gait: Gait abnormal.   Psychiatric:         Mood and Affect: Mood normal.

## 2025-01-31 NOTE — PATIENT INSTRUCTIONS
I feel is secondary sinusitis and is developing bronchitis.  Will place on 1 week course of Levaquin 500 mg daily and should use saline nasal rinse at bedtime and in the morning.  Does have swelling in the palm of the hand on the left which may be giving intermittent problems with yeast.  The skin does not appear dry.  Will try using Nizoral cream daily x 7 to 10 days as needed.  I do not think anything further can be done to improve the spastic quality with the left hand

## 2025-02-09 DIAGNOSIS — F41.1 GENERALIZED ANXIETY DISORDER: Chronic | ICD-10-CM

## 2025-02-09 DIAGNOSIS — S14.129S CENTRAL CORD SYNDROME, SEQUELA (HCC): Chronic | ICD-10-CM

## 2025-02-10 RX ORDER — CITALOPRAM HYDROBROMIDE 20 MG/1
20 TABLET ORAL DAILY
Qty: 90 TABLET | Refills: 1 | Status: SHIPPED | OUTPATIENT
Start: 2025-02-10

## 2025-02-10 RX ORDER — OXYBUTYNIN CHLORIDE 5 MG/1
5 TABLET ORAL 2 TIMES DAILY
Qty: 180 TABLET | Refills: 1 | Status: SHIPPED | OUTPATIENT
Start: 2025-02-10

## 2025-03-12 ENCOUNTER — OFFICE VISIT (OUTPATIENT)
Dept: FAMILY MEDICINE CLINIC | Facility: CLINIC | Age: 75
End: 2025-03-12
Payer: MEDICARE

## 2025-03-12 VITALS
DIASTOLIC BLOOD PRESSURE: 82 MMHG | SYSTOLIC BLOOD PRESSURE: 124 MMHG | HEIGHT: 72 IN | BODY MASS INDEX: 29.26 KG/M2 | WEIGHT: 216 LBS

## 2025-03-12 DIAGNOSIS — F41.1 GENERALIZED ANXIETY DISORDER: Chronic | ICD-10-CM

## 2025-03-12 DIAGNOSIS — G62.9 NEUROPATHY: Chronic | ICD-10-CM

## 2025-03-12 DIAGNOSIS — D68.00 VON WILLEBRAND'S DISEASE (HCC): ICD-10-CM

## 2025-03-12 DIAGNOSIS — J30.1 SEASONAL ALLERGIC RHINITIS DUE TO POLLEN: Chronic | ICD-10-CM

## 2025-03-12 DIAGNOSIS — S14.129S CENTRAL CORD SYNDROME, SEQUELA (HCC): Chronic | ICD-10-CM

## 2025-03-12 DIAGNOSIS — Z12.12 SCREENING FOR COLORECTAL CANCER: ICD-10-CM

## 2025-03-12 DIAGNOSIS — Z12.11 SCREENING FOR COLORECTAL CANCER: ICD-10-CM

## 2025-03-12 DIAGNOSIS — I10 ESSENTIAL HYPERTENSION: Primary | Chronic | ICD-10-CM

## 2025-03-12 DIAGNOSIS — Z00.00 MEDICARE ANNUAL WELLNESS VISIT, SUBSEQUENT: ICD-10-CM

## 2025-03-12 DIAGNOSIS — R73.09 ABNORMAL GLUCOSE: Chronic | ICD-10-CM

## 2025-03-12 DIAGNOSIS — G82.50 QUADRIPLEGIA (HCC): ICD-10-CM

## 2025-03-12 PROCEDURE — G2211 COMPLEX E/M VISIT ADD ON: HCPCS | Performed by: FAMILY MEDICINE

## 2025-03-12 PROCEDURE — 99214 OFFICE O/P EST MOD 30 MIN: CPT | Performed by: FAMILY MEDICINE

## 2025-03-12 PROCEDURE — G0439 PPPS, SUBSEQ VISIT: HCPCS | Performed by: FAMILY MEDICINE

## 2025-03-12 NOTE — ASSESSMENT & PLAN NOTE
Does follow-up with physiatry.  Continue current regimen and push activity as tolerated.  Also follows up with urology and does self catheterizations 4-5 times a day

## 2025-03-12 NOTE — PATIENT INSTRUCTIONS
Overall seems to be functioning well.  Should check in for eye exam.  Should also do a living will and I gave him a form for this.  Is going to have lab work done by urology.  Try to push daily activity and watch the starch in the diet.  Will give Prevnar 20 to update immunizations and should get tetanus booster at pharmacy.  Will do Cologuard

## 2025-03-12 NOTE — PROGRESS NOTES
Name: Tab Grigsby      : 1950      MRN: [id number]  Encounter Provider: Felipe Villasenor MD  Encounter Date: 3/12/2025   Encounter department: [facility] PRIMARY CARE    Assessment & Plan  Screening for colorectal cancer    Orders:    Cologuard    Von Willebrand's disease (HCC)  Has been stable, will continue to follow       Abnormal glucose  Does have renal panel and A1c pending.  Watch sweets and starch in diet       Central cord syndrome, sequela (HCC)  Does follow-up with physiatry.  Continue current regimen and push activity as tolerated.  Also follows up with urology and does self catheterizations 4-5 times a day       Essential hypertension  Stable, continue current regimen       Generalized anxiety disorder  Seems to be doing well, continue current regimen       Neuropathy  Seems stable, continue current regimen       Quadriplegia (HCC)  Does follow-up with physiatry but seems to be functioning well.  Continue current medication       Seasonal allergic rhinitis due to pollen  Doing well, continue as needed over-the-counter meds       Medicare annual wellness visit, subsequent            Preventive health issues were discussed with patient, and age appropriate screening tests were ordered as noted in patient's After Visit Summary. Personalized health advice and appropriate referrals for health education or preventive services given if needed, as noted in patient's After Visit Summary.    History of Present Illness       Patient has history of spinal cord injury with central cord syndrome and does self catheterization to pass urine.  Also has history of hypertension diabetes mellitus adult, allergic rhinitis lower leg neuropathy, migraines, and stress disorder.  Seen today also for Medicare wellness visit       Patient Care Team:  Felipe Villasenor MD as PCP - General (Family Medicine)    Review of Systems   Constitutional:  Negative for activity change, appetite  change, chills, fatigue, fever and unexpected weight change.   HENT:  Negative for congestion, rhinorrhea and trouble swallowing.    Eyes: Negative.  Negative for visual disturbance.   Respiratory:  Negative for apnea, cough, chest tightness and shortness of breath.    Cardiovascular:  Negative for chest pain, palpitations and leg swelling.   Gastrointestinal:  Negative for abdominal distention, abdominal pain, constipation and diarrhea.   Genitourinary:  Positive for difficulty urinating (Does self-catheterization 4-5 times a day).   Musculoskeletal:  Positive for arthralgias, back pain, gait problem and neck pain. Negative for myalgias.   Skin:  Negative for color change and rash.   Allergic/Immunologic: Positive for environmental allergies.   Neurological:  Positive for dizziness. Negative for weakness, light-headedness, numbness and headaches.   Hematological:  Negative for adenopathy.   Psychiatric/Behavioral:  Negative for agitation and sleep disturbance.    All other systems reviewed and are negative.    Medical History Reviewed by provider this encounter:       Annual Wellness Visit Questionnaire   Tab is here for his Subsequent Wellness visit. Last Medicare Wellness visit information reviewed, patient interviewed and updates made to the record today.      Health Risk Assessment:   Patient rates overall health as fair. Patient feels that their physical health rating is same. Patient is satisfied with their life. Eyesight was rated as same. Hearing was rated as same. Patient feels that their emotional and mental health rating is same. Patients states they are never, rarely angry. Patient states they are often unusually tired/fatigued. Pain experienced in the last 7 days has been a lot. Patient's pain rating has been 7/10. Patient states that he has experienced no weight loss or gain in last 6 months. Continues to have his neck and back pain but seems to be functioning well    Fall Risk Screening:   In the  past year, patient has experienced: no history of falling in past year      Home Safety:  Patient does not have trouble with stairs inside or outside of their home. Patient has working smoke alarms and has working carbon monoxide detector. Home safety hazards include: none. No issues    Nutrition:   Current diet is Regular. Watch starch in diet    Medications:   Patient is not currently taking any over-the-counter supplements. Patient is able to manage medications. No issues    Activities of Daily Living (ADLs)/Instrumental Activities of Daily Living (IADLs):   Walk and transfer into and out of bed and chair?: Yes  Dress and groom yourself?: Yes    Bathe or shower yourself?: Yes    Feed yourself? Yes  Do your laundry/housekeeping?: Yes  Manage your money, pay your bills and track your expenses?: Yes  Make your own meals?: Yes    Do your own shopping?: Yes    ADL comments: Overall functions well around the house.  No issues    Previous Hospitalizations:       Hospitalization Comments: Patient follows up with [facility] for physiatry and urology    Advance Care Planning:   Living will: No    Durable POA for healthcare: No    Advanced directive: No    Advanced directive counseling given: Yes    ACP document given: Yes    End of Life Decisions reviewed with patient: No      Comments: Discussed the importance of doing this and I gave him a form for this    Cognitive Screening:   Provider or family/friend/caregiver concerned regarding cognition?: No    PREVENTIVE SCREENINGS      Cardiovascular Screening:    General: Screening Current      Diabetes Screening:     General: Screening Current    Due for: Blood Glucose      Colorectal Cancer Screening:       Due for: Cologuard      Prostate Cancer Screening:    General: Screening Current      Osteoporosis Screening:    General: Screening Not Indicated      Abdominal Aortic Aneurysm (AAA) Screening:    Risk factors include: age between 65-74 yo and tobacco use         General: Screening Not Indicated      Lung Cancer Screening:     General: Screening Not Indicated      Hepatitis C Screening:    General: Risks and Benefits Discussed      Preventive Screening Comments: Follow-up with routine eye care and have reports forwarded to office    Screening, Brief Intervention, and Referral to Treatment (SBIRT)     Screening  Typical number of drinks in a day: 0    Single Item Drug Screening:  How often have you used an illegal drug (including marijuana) or a prescription medication for non-medical reasons in the past year? never    Single Item Drug Screen Score: 0  Interpretation: Negative screen for possible drug use disorder    Brief Intervention  Alcohol & drug use screenings were reviewed. No concerns regarding substance use disorder identified.     Annual Depression Screening  Time spent screening and evaluating the patient for depression during today's encounter was 5 minutes.    Social Drivers of Health     Financial Resource Strain: Low Risk  (1/28/2024)    Overall Financial Resource Strain (CARDIA)     Difficulty of Paying Living Expenses: Not hard at all   Transportation Needs: No Transportation Needs (1/28/2024)    PRAPARE - Transportation     Lack of Transportation (Medical): No     Lack of Transportation (Non-Medical): No     No results found.    Objective   Ht 6' (1.829 m)   BMI 28.97 kg/m²     Physical Exam  Vitals and nursing note reviewed.   Constitutional:       Appearance: Normal appearance.   HENT:      Head: Normocephalic.      Right Ear: Tympanic membrane, ear canal and external ear normal. Decreased hearing (25 dB hearing screen off except at 500 Hz.  No difficulty with conversation) noted.      Left Ear: Tympanic membrane, ear canal and external ear normal. Decreased hearing (25 dB hearing screen is off) noted.      Nose: Nose normal.      Mouth/Throat:      Mouth: Mucous membranes are moist.      Dentition: Normal dentition.   Eyes:      Extraocular Movements:  Extraocular movements intact.      Conjunctiva/sclera: Conjunctivae normal.      Pupils: Pupils are equal, round, and reactive to light.   Neck:      Vascular: No carotid bruit.   Cardiovascular:      Rate and Rhythm: Normal rate and regular rhythm.      Pulses:           Dorsalis pedis pulses are 2+ on the right side and 2+ on the left side.        Posterior tibial pulses are 1+ on the right side and 1+ on the left side.      Heart sounds: Normal heart sounds. No murmur (Rate is 72) heard.  Pulmonary:      Effort: Pulmonary effort is normal.      Breath sounds: Normal breath sounds.   Abdominal:      General: Abdomen is flat. There is no distension.      Palpations: Abdomen is soft. There is no mass.      Tenderness: There is no abdominal tenderness.   Musculoskeletal:         General: No swelling or deformity (Does have spastic quality of the upper extremities).      Cervical back: Normal range of motion and neck supple. No tenderness. No muscular tenderness.      Right foot: No deformity.   Feet:      Right foot:      Protective Sensation: 8 sites tested.  4 sites sensed.      Skin integrity: Skin integrity normal.      Left foot:      Protective Sensation: 8 sites tested.  4 sites sensed.      Skin integrity: Skin integrity normal.   Lymphadenopathy:      Cervical: No cervical adenopathy.   Skin:     General: Skin is warm and dry.      Capillary Refill: Capillary refill takes 2 to 3 seconds.      Findings: No rash.   Neurological:      Mental Status: He is alert and oriented to person, place, and time.      Cranial Nerves: No cranial nerve deficit.      Motor: No weakness.      Coordination: Coordination normal.      Gait: Gait abnormal.      Deep Tendon Reflexes: Reflexes abnormal.   Psychiatric:         Mood and Affect: Mood normal.

## 2025-03-20 ENCOUNTER — APPOINTMENT (OUTPATIENT)
Dept: LAB | Facility: HOSPITAL | Age: 75
End: 2025-03-20
Payer: MEDICARE

## 2025-03-20 DIAGNOSIS — Z12.5 ENCOUNTER FOR SCREENING FOR MALIGNANT NEOPLASM OF PROSTATE: ICD-10-CM

## 2025-03-20 DIAGNOSIS — R33.9 RETENTION OF URINE, UNSPECIFIED: ICD-10-CM

## 2025-03-20 LAB — PSA SERPL-MCNC: 1.21 NG/ML (ref 0–4)

## 2025-03-20 PROCEDURE — G0103 PSA SCREENING: HCPCS

## 2025-03-20 PROCEDURE — 36415 COLL VENOUS BLD VENIPUNCTURE: CPT

## 2025-03-21 LAB — COLOGUARD RESULT REPORTABLE: NEGATIVE

## 2025-03-24 ENCOUNTER — RESULTS FOLLOW-UP (OUTPATIENT)
Dept: FAMILY MEDICINE CLINIC | Facility: CLINIC | Age: 75
End: 2025-03-24

## 2025-03-24 NOTE — TELEPHONE ENCOUNTER
----- Message from Felipe Villasenor MD sent at 3/24/2025  9:24 AM EDT -----  Please call patient and inform of normal stool screening

## 2025-04-11 PROBLEM — Z00.00 MEDICARE ANNUAL WELLNESS VISIT, SUBSEQUENT: Status: RESOLVED | Noted: 2025-03-12 | Resolved: 2025-04-11

## 2025-04-27 DIAGNOSIS — I10 ESSENTIAL HYPERTENSION: Chronic | ICD-10-CM

## 2025-04-28 RX ORDER — LISINOPRIL 20 MG/1
20 TABLET ORAL DAILY
Qty: 90 TABLET | Refills: 1 | Status: SHIPPED | OUTPATIENT
Start: 2025-04-28

## 2025-06-10 ENCOUNTER — RA CDI HCC (OUTPATIENT)
Dept: OTHER | Facility: HOSPITAL | Age: 75
End: 2025-06-10

## 2025-06-11 ENCOUNTER — OFFICE VISIT (OUTPATIENT)
Age: 75
End: 2025-06-11
Payer: MEDICARE

## 2025-06-11 VITALS
HEIGHT: 72 IN | WEIGHT: 210.6 LBS | DIASTOLIC BLOOD PRESSURE: 78 MMHG | TEMPERATURE: 97.5 F | SYSTOLIC BLOOD PRESSURE: 122 MMHG | BODY MASS INDEX: 28.53 KG/M2 | OXYGEN SATURATION: 100 % | HEART RATE: 61 BPM

## 2025-06-11 DIAGNOSIS — S14.129S CENTRAL CORD SYNDROME, SEQUELA (HCC): Chronic | ICD-10-CM

## 2025-06-11 DIAGNOSIS — I10 ESSENTIAL HYPERTENSION: Primary | Chronic | ICD-10-CM

## 2025-06-11 DIAGNOSIS — G62.9 NEUROPATHY: Chronic | ICD-10-CM

## 2025-06-11 DIAGNOSIS — R73.09 ABNORMAL GLUCOSE: Chronic | ICD-10-CM

## 2025-06-11 DIAGNOSIS — F41.1 GENERALIZED ANXIETY DISORDER: Chronic | ICD-10-CM

## 2025-06-11 DIAGNOSIS — G82.50 QUADRIPLEGIA (HCC): ICD-10-CM

## 2025-06-11 PROCEDURE — 99214 OFFICE O/P EST MOD 30 MIN: CPT | Performed by: FAMILY MEDICINE

## 2025-06-11 NOTE — PATIENT INSTRUCTIONS
Overall seems to be doing well.  Try to keep up the daily physical activity and continue current regimen

## 2025-06-11 NOTE — PROGRESS NOTES
Name: Tab Grigsby      : 1950      MRN: [id number]  Encounter Provider: Felipe Villasenor MD  Encounter Date: 2025   Encounter department: [facility] PRIMARY CARE    Assessment & Plan  Essential hypertension  Overall doing well, continue current regimen       Neuropathy  Stable, continue current regimen       Quadriplegia (HCC)  Seems to be functioning well.  Continue follow-up and push activity as tolerated       Central cord syndrome, sequela (HCC)  Continue the routine catheterizations       Abnormal glucose  Continue to watch sweets and starch in diet and push daily activity       Generalized anxiety disorder  Doing well, continue current regimen         Depression Screening and Follow-up Plan: Patient was screened for depression during today's encounter. They screened negative with a PHQ-2 score of 0.          History of Present Illness       Patient has history of spinal cord injury with central cord syndrome and does self catheterization to pass urine.  Also has history of hypertension diabetes mellitus adult, allergic rhinitis lower leg neuropathy, migraines, and stress disorder.  Overall has been doing well      Review of Systems   Respiratory:  Positive for cough.    Cardiovascular:  Negative for palpitations.   Gastrointestinal:  Negative for constipation and diarrhea.   Genitourinary:  Positive for difficulty urinating.   Musculoskeletal:  Positive for arthralgias.   Allergic/Immunologic: Positive for environmental allergies.   Neurological:  Positive for dizziness. Negative for light-headedness.     Past Medical History[1]  Past Surgical History[2]  Family History[3]  Social History[4]  Medications[5]  Allergies   Allergen Reactions    Penicillins Other (See Comments)     RASH, hives; valentín PIP 01 jgo    Sulfamethoxazole-Trimethoprim Other (See Comments)     developed rash     Immunization History   Administered Date(s) Administered    COVID-19 PFIZER  VACCINE 0.3 ML IM 03/24/2021, 12/08/2021    COVID-19 Pfizer vac (Grover-sucrose, gray cap) 12 yr+ IM 05/18/2022    COVID-19, unspecified 03/03/2021    INFLUENZA 09/27/2001, 10/31/2002, 10/25/2006, 11/20/2007, 11/25/2008, 12/07/2009, 11/30/2010, 09/16/2011, 10/02/2013, 11/21/2014, 09/30/2015, 11/18/2016, 12/13/2017, 10/23/2018    Influenza Split High Dose Preservative Free IM 12/04/2024    Influenza, high dose seasonal 0.7 mL 11/15/2019, 12/09/2020, 09/15/2021, 09/26/2022, 10/23/2023    Influenza, seasonal, injectable 10/31/2002, 11/20/2007, 11/25/2008, 12/07/2009, 11/30/2010    Pneumococcal Polysaccharide PPV23 08/20/2014    Tdap 05/16/2014     Objective   /78 (BP Location: Left arm, Patient Position: Sitting, Cuff Size: Standard)   Pulse 61   Temp 97.5 °F (36.4 °C)   Ht 6' (1.829 m)   Wt 95.5 kg (210 lb 9.6 oz)   SpO2 100%   BMI 28.56 kg/m²     Physical Exam  Vitals and nursing note reviewed.   Constitutional:       General: He is not in acute distress.     Appearance: He is well-developed.   HENT:      Head: Normocephalic and atraumatic.     Eyes:      Conjunctiva/sclera: Conjunctivae normal.       Cardiovascular:      Rate and Rhythm: Normal rate and regular rhythm.      Heart sounds: No murmur heard.  Pulmonary:      Effort: Pulmonary effort is normal. No respiratory distress.      Breath sounds: Normal breath sounds.   Abdominal:      Palpations: Abdomen is soft.      Tenderness: There is no abdominal tenderness.     Musculoskeletal:         General: No swelling.      Cervical back: Neck supple.     Skin:     General: Skin is warm and dry.      Capillary Refill: Capillary refill takes less than 2 seconds.     Neurological:      Mental Status: He is alert.     Psychiatric:         Mood and Affect: Mood normal.                [1]   Past Medical History:  Diagnosis Date    Allergic NA    Allergic rhinitis     Anxiety 7/1998    Arthritis     C2 spinal cord injury (HCC)     Clotting disorder (HCC) 7/1998  "   Depression     Essential hypertension     Hypertension 7/2018    Self-catheterizes urinary bladder     Shingles 7/2017    Urinary tract infection 7/2000    Von Willebrand disease (HCC)     Pt requires DDAVP prior to \"major\" surgery    [2]   Past Surgical History:  Procedure Laterality Date    CHOLECYSTECTOMY      FEEDING TUBE PLACEMENT      GASTROSTOMY  7/2000   [3]   Family History  Problem Relation Name Age of Onset    Diabetes Mother Jen Grigsby     Hypertension Mother Jen Grigsby     Hyperlipidemia Mother Jen Grigsby     Diabetes Father Matthew Grigsby     Hypertension Father Matthew Grigsby     Hyperlipidemia Father Matthew Grigsby     Hyperlipidemia Maternal Grandmother Grandmother    [4]   Social History  Tobacco Use    Smoking status: Former     Current packs/day: 1.00     Average packs/day: 1 pack/day for 20.0 years (20.0 ttl pk-yrs)     Types: Cigarettes    Smokeless tobacco: Never   Vaping Use    Vaping status: Never Used   Substance and Sexual Activity    Alcohol use: Not Currently     Comment: Rarely     Drug use: Never    Sexual activity: Not Currently   [5]   Current Outpatient Medications on File Prior to Visit   Medication Sig    citalopram (CeleXA) 20 mg tablet TAKE 1 TABLET BY MOUTH DAILY    fexofenadine (Allegra Allergy) 180 MG tablet Take 180 mg by mouth in the morning.    gabapentin (Neurontin) 300 mg capsule Take 1 capsule (300 mg total) by mouth 3 (three) times a day    glucosamine-chondroitin 500-400 MG tablet Take 1 tablet by mouth in the morning.    ketoconazole (NIZORAL) 2 % cream Apply topically daily    lisinopril (ZESTRIL) 20 mg tablet TAKE 1 TABLET BY MOUTH DAILY    LORazepam (ATIVAN) 0.5 mg tablet Take 1 tablet (0.5 mg total) by mouth daily as needed for anxiety    meloxicam (MOBIC) 15 mg tablet Take 1 tablet (15 mg total) by mouth daily    Multiple Vitamins-Minerals (MULTIVITAMIN ADULTS PO) Take 1 tablet by mouth in the morning.    oxybutynin (DITROPAN) 5 mg " tablet TAKE 1 TABLET BY MOUTH TWICE  DAILY    hydrocortisone-acetic acid (VOSOL-HC) otic solution Administer 3 drops into both ears every 6 (six) hours As needed for itching in the ears (Patient not taking: Reported on 1/30/2025)    ketoconazole (NIZORAL) 2 % cream Apply topically daily (Patient not taking: Reported on 1/30/2025)

## 2025-06-21 DIAGNOSIS — S14.129S CENTRAL CORD SYNDROME, SEQUELA (HCC): Chronic | ICD-10-CM

## 2025-06-21 DIAGNOSIS — F41.1 GENERALIZED ANXIETY DISORDER: Chronic | ICD-10-CM

## 2025-06-22 RX ORDER — CITALOPRAM HYDROBROMIDE 20 MG/1
20 TABLET ORAL DAILY
Qty: 90 TABLET | Refills: 1 | Status: SHIPPED | OUTPATIENT
Start: 2025-06-22

## 2025-06-22 RX ORDER — OXYBUTYNIN CHLORIDE 5 MG/1
5 TABLET ORAL 2 TIMES DAILY
Qty: 180 TABLET | Refills: 1 | Status: SHIPPED | OUTPATIENT
Start: 2025-06-22